# Patient Record
Sex: FEMALE | Race: WHITE | NOT HISPANIC OR LATINO | Employment: OTHER | URBAN - METROPOLITAN AREA
[De-identification: names, ages, dates, MRNs, and addresses within clinical notes are randomized per-mention and may not be internally consistent; named-entity substitution may affect disease eponyms.]

---

## 2017-01-25 ENCOUNTER — OFFICE VISIT (OUTPATIENT)
Dept: FAMILY MEDICINE | Facility: CLINIC | Age: 77
End: 2017-01-25
Payer: MEDICARE

## 2017-01-25 VITALS
HEART RATE: 58 BPM | WEIGHT: 178 LBS | SYSTOLIC BLOOD PRESSURE: 130 MMHG | DIASTOLIC BLOOD PRESSURE: 64 MMHG | BODY MASS INDEX: 26.98 KG/M2 | TEMPERATURE: 99 F | HEIGHT: 68 IN | OXYGEN SATURATION: 97 %

## 2017-01-25 DIAGNOSIS — E78.2 MIXED HYPERLIPIDEMIA: ICD-10-CM

## 2017-01-25 DIAGNOSIS — Z12.39 SCREENING FOR BREAST CANCER: ICD-10-CM

## 2017-01-25 DIAGNOSIS — N32.81 OAB (OVERACTIVE BLADDER): ICD-10-CM

## 2017-01-25 DIAGNOSIS — I10 ESSENTIAL HYPERTENSION: ICD-10-CM

## 2017-01-25 DIAGNOSIS — Z23 ENCOUNTER FOR IMMUNIZATION: ICD-10-CM

## 2017-01-25 DIAGNOSIS — Z00.00 ROUTINE MEDICAL EXAM: Primary | ICD-10-CM

## 2017-01-25 PROCEDURE — 99213 OFFICE O/P EST LOW 20 MIN: CPT | Mod: PBBFAC,PO

## 2017-01-25 PROCEDURE — 99999 PR PBB SHADOW E&M-EST. PATIENT-LVL III: CPT | Mod: PBBFAC,,,

## 2017-01-25 PROCEDURE — G0008 ADMIN INFLUENZA VIRUS VAC: HCPCS | Mod: PBBFAC,PO

## 2017-01-25 PROCEDURE — 90670 PCV13 VACCINE IM: CPT | Mod: PBBFAC,PO

## 2017-01-25 PROCEDURE — 99397 PER PM REEVAL EST PAT 65+ YR: CPT | Mod: S$PBB,,,

## 2017-01-25 RX ORDER — SOLIFENACIN SUCCINATE 10 MG/1
10 TABLET, FILM COATED ORAL DAILY
Qty: 90 TABLET | Refills: 1 | Status: SHIPPED | OUTPATIENT
Start: 2017-01-25 | End: 2017-05-15 | Stop reason: SDUPTHER

## 2017-01-25 NOTE — MR AVS SNAPSHOT
[facility]  [address]  [place] [address]  Phone: [phone]  Fax: [phone]                  Lola Lerner   2017 2:20 PM   Office Visit    Description:  Female : 1940   Provider:  Eliazar Salazar Jr., MD   Department:  [facility]           Reason for Visit     Annual Exam           Diagnoses this Visit        Comments    Routine medical exam    -  Primary     Essential hypertension         Mixed hyperlipidemia         OAB (overactive bladder)         Encounter for immunization         Screening for breast cancer                To Do List           Future Appointments        Provider Department Dept Phone    2017 9:00 AM [facility] CT1 LIMIT 400 [address] [facility] [phone]    2017 9:30 AM [facility] CT1 LIMIT 400 L[facility] [phone]    5/15/2017 10:00 AM Ryann Bell MD [facility] [phone]      Goals (5 Years of Data)     None      Follow-Up and Disposition     Return in about 1 year (around 2018).       These Medications        Disp Refills Start End    solifenacin (VESICARE) 10 MG tablet 90 tablet 1 2017     Take 1 tablet (10 mg total) by mouth once daily. - Oral    Pharmacy: [facility] - [address] #: [phone]         [facility]     [facility] On [facility] Nurse Care Line -  Assistance  Registered nurses in the [facility] provide clinical advisement, health education, appointment booking, and other advisory services.  Call for this free service at [phone].             Medications           Message regarding Medications     Verify the changes and/or additions to your medication regime listed below are the same as discussed with your clinician today.  If any of these changes or additions are incorrect, please notify your healthcare provider.             Verify that the below list of medications  "is an accurate representation of the medications you are currently taking.  If none reported, the list may be blank. If incorrect, please contact your healthcare provider. Carry this list with you in case of emergency.           Current Medications     aspirin (ECOTRIN) 81 MG EC tablet Take 81 mg by mouth every evening. 1 Tablet, Delayed Release (E.C.) Oral Every day    atenolol (TENORMIN) 25 MG tablet Take 25 mg by mouth every morning.  Tablet Oral     calcium carbonate-vitamin D3 600 mg(1,500mg) -100 unit Cap Take by mouth. 1 Capsule Oral Twice a day    docusate sodium (COLACE) 100 MG capsule Take 1 capsule (100 mg total) by mouth 2 (two) times daily.    losartan-hydrochlorothiazide 100-25 mg (HYZAAR) 100-25 mg per tablet Take 1 tablet by mouth every morning. 1 Tablet Oral Every day    multivitamin (ONE DAILY MULTIVITAMIN) per tablet Take 1 tablet by mouth every morning.     rosuvastatin (CRESTOR) 10 MG tablet Take 10 mg by mouth every evening. 1 Tablet Oral Every day    solifenacin (VESICARE) 10 MG tablet Take 1 tablet (10 mg total) by mouth once daily.           Clinical Reference Information           Vital Signs - Last Recorded  Most recent update: 1/25/2017  2:13 PM by Juarez Moon MA    BP Pulse Temp Ht Wt SpO2    130/64 (BP Location: Left arm, Patient Position: Sitting, BP Method: Manual) (!) 58 98.5 °F (36.9 °C) 5' 8" (1.727 m) 80.7 kg (178 lb 0.3 oz) 97%    BMI                27.07 kg/m2          Blood Pressure          Most Recent Value    BP  130/64      Allergies as of 1/25/2017     No Known Allergies      Immunizations Administered on Date of Encounter - 1/25/2017     Name Date Dose VIS Date Route    Influenza - High Dose  Incomplete 0.5 mL 8/7/2015 Intramuscular    Pneumococcal Conjugate - 13 Valent  Incomplete 0.5 mL 11/5/2015 Intramuscular      Orders Placed During Today's Visit      Normal Orders This Visit    Influenza - High Dose (65+) (PF) (IM)     Pneumococcal Conjugate Vaccine (13 " Linda) (IM)     Future Labs/Procedures Expected by Expires    Comprehensive metabolic panel  1/25/2017 1/25/2018    Lipid panel  1/25/2017 1/25/2018    Mammo Breast Specimen  1/25/2017 3/25/2018      [facility] Sign-Up     Activating your [facility] account is as easy as 1-2-3!     1) Visit [email or web address], select Sign Up Now, enter this activation code and your date of birth, then select Next.  [email or web address]  Expires: 3/11/2017  2:49 PM      2) Create a username and password to use when you visit [facility] in the future and select a security question in case you lose your password and select Next.    3) Enter your e-mail address and click Sign Up!    Additional Information  If you have questions, please e-mail [email or web address] or call [phone] to talk to our [facility] staff. Remember, [facility] is NOT to be used for urgent needs. For medical emergencies, dial 911.

## 2017-01-25 NOTE — PROGRESS NOTES
Chief Complaint   Patient presents with    Annual Exam       HPI  Lola Lerner is a 76 y.o. female who presents to the office for periodic evaluation/physical examination.  Since I had seen her most recently about 15 or 20 months ago, patient has had a right partial nephrectomy for renal cell carcinoma.  Margins are all clear, patient has recovered nicely.  She has hypertension, hyperlipidemia, urinary incontinence with overactive bladder, but in general is in very good health, with good and healthy lifestyle.  Pt is known to me.    HPI    PAST MEDICAL HISTORY:  Past Medical History   Diagnosis Date    Hyperlipidemia     Hypertension     Urge incontinence      sees Dr. Julien, urology    Vaginal delivery      x3       PAST SURGICAL HISTORY:  Past Surgical History   Procedure Laterality Date    Cholecystectomy      Breast biopsy       Benign    Partial nephrectomy         SOCIAL HISTORY:  Social History     Social History    Marital status:      Spouse name: N/A    Number of children: N/A    Years of education: N/A     Occupational History    Not on file.     Social History Main Topics    Smoking status: Never Smoker    Smokeless tobacco: Never Used    Alcohol use No    Drug use: No    Sexual activity: Not on file     Other Topics Concern    Not on file     Social History Narrative     x 50 yr.  .  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          FAMILY HISTORY:  Family History   Problem Relation Age of Onset    Heart disease Mother        ALLERGIES AND MEDICATIONS: updated and reviewed.  Review of patient's allergies indicates:  No Known Allergies  Current Outpatient Prescriptions   Medication Sig Dispense Refill    aspirin (ECOTRIN) 81 MG EC tablet Take 81 mg by mouth every evening. 1 Tablet, Delayed Release (E.C.) Oral Every day      atenolol (TENORMIN) 25 MG tablet Take 25 mg by mouth every morning.  Tablet Oral       calcium carbonate-vitamin D3 600 mg(1,500mg) -100 unit Cap Take  "by mouth. 1 Capsule Oral Twice a day      docusate sodium (COLACE) 100 MG capsule Take 1 capsule (100 mg total) by mouth 2 (two) times daily. 60 capsule 0    losartan-hydrochlorothiazide 100-25 mg (HYZAAR) 100-25 mg per tablet Take 1 tablet by mouth every morning. 1 Tablet Oral Every day      multivitamin (ONE DAILY MULTIVITAMIN) per tablet Take 1 tablet by mouth every morning.       rosuvastatin (CRESTOR) 10 MG tablet Take 10 mg by mouth every evening. 1 Tablet Oral Every day      solifenacin (VESICARE) 10 MG tablet Take 1 tablet (10 mg total) by mouth once daily. 90 tablet 1     No current facility-administered medications for this visit.        ROS  Review of Systems   Constitutional: Negative for activity change and unexpected weight change.   HENT: Negative.    Eyes: Negative.    Respiratory: Negative.    Cardiovascular: Negative.    Gastrointestinal:        No change of bowel habits.  No need for further colonoscopies.   Endocrine: Negative.    Genitourinary:        See history of present illness.   Musculoskeletal: Negative.    Neurological: Negative.    Psychiatric/Behavioral: Negative.        Physical Exam  Vitals:    01/25/17 1412   BP: 130/64   Pulse: (!) 58   Temp: 98.5 °F (36.9 °C)    Body mass index is 27.07 kg/(m^2).  Weight: 80.7 kg (178 lb 0.3 oz)   Height: 5' 8" (172.7 cm)     Physical Exam   Constitutional: She is oriented to person, place, and time. She appears well-developed and well-nourished. No distress.   HENT:   TMs are normal.  Oropharynx is clear.   Eyes: Conjunctivae are normal. Pupils are equal, round, and reactive to light. No scleral icterus.   Neck: Normal range of motion. Neck supple. No thyromegaly present.   Cardiovascular: Normal rate and regular rhythm.    No murmur heard.  Pulmonary/Chest: Effort normal and breath sounds normal.   Abdominal: Soft. Bowel sounds are normal. She exhibits no mass.   Musculoskeletal: Normal range of motion. She exhibits no edema. "   Neurological: She is alert and oriented to person, place, and time.   Skin: Skin is warm and dry.   Psychiatric: She has a normal mood and affect. Her behavior is normal.   Vitals reviewed.      Health Maintenance       Date Due Completion Date    TETANUS VACCINE 7/12/1959 ---    DEXA SCAN 6/15/1998 6/15/1995 (Done)    Override on 6/15/1995: Done    Pneumococcal (65+) (2 of 2 - PCV13) 6/8/2016 6/8/2015    Lipid Panel 4/2/2020 4/2/2015 (Done)    Override on 4/2/2015: Done    Colonoscopy 1/25/2027 1/25/2017 (Not Clinical)    Override on 1/25/2017: Not Clinically Appropriate    Override on 7/17/2005: Done          Assessment & Plan    1. Routine medical exam  Healthy examination.  Continue healthy lifestyle.    2. Essential hypertension  Continue present medical regimen.  - Comprehensive metabolic panel; Future    3. Mixed hyperlipidemia  Continue present medical regimen.  - Lipid panel; Future    4. OAB (overactive bladder)  Medication refilled for the patient, she can follow-up with Dr. Bell.  - solifenacin (VESICARE) 10 MG tablet; Take 1 tablet (10 mg total) by mouth once daily.  Dispense: 90 tablet; Refill: 1    5. Encounter for immunization  This catches the patient up for health maintenance.  - Influenza - High Dose (65+) (PF) (IM)  - Pneumococcal Conjugate Vaccine (13 Valent) (IM)    6. Screening for breast cancer  Routine examination requested.  - Mammo Breast Specimen; Future      Return in about 1 year (around 1/25/2018).

## 2017-01-26 ENCOUNTER — HOSPITAL ENCOUNTER (OUTPATIENT)
Dept: RADIOLOGY | Facility: HOSPITAL | Age: 77
Discharge: HOME | End: 2017-01-26
Payer: MEDICARE

## 2017-01-26 DIAGNOSIS — Z12.31 ENCOUNTER FOR SCREENING MAMMOGRAM FOR BREAST CANCER: ICD-10-CM

## 2017-01-26 DIAGNOSIS — Z12.39 SCREENING FOR BREAST CANCER: ICD-10-CM

## 2017-01-26 PROCEDURE — 77067 SCR MAMMO BI INCL CAD: CPT | Mod: TC

## 2017-01-26 PROCEDURE — 77063 BREAST TOMOSYNTHESIS BI: CPT | Mod: 26,,, | Performed by: RADIOLOGY

## 2017-01-26 PROCEDURE — 77067 SCR MAMMO BI INCL CAD: CPT | Mod: 26,,, | Performed by: RADIOLOGY

## 2017-05-08 ENCOUNTER — HOSPITAL ENCOUNTER (OUTPATIENT)
Dept: RADIOLOGY | Facility: HOSPITAL | Age: 77
Discharge: HOME | End: 2017-05-08
Attending: UROLOGY
Payer: MEDICARE

## 2017-05-08 DIAGNOSIS — C64.1 MALIGNANT NEOPLASM OF RIGHT KIDNEY: ICD-10-CM

## 2017-05-08 PROCEDURE — 74178 CT ABD&PLV WO CNTR FLWD CNTR: CPT | Mod: TC

## 2017-05-08 PROCEDURE — 71260 CT THORAX DX C+: CPT | Mod: 26,,, | Performed by: RADIOLOGY

## 2017-05-08 PROCEDURE — 25500020 PHARM REV CODE 255: Performed by: UROLOGY

## 2017-05-08 PROCEDURE — 71260 CT THORAX DX C+: CPT | Mod: TC

## 2017-05-08 PROCEDURE — 74178 CT ABD&PLV WO CNTR FLWD CNTR: CPT | Mod: 26,,, | Performed by: RADIOLOGY

## 2017-05-08 RX ADMIN — IOHEXOL 15 ML: 300 INJECTION, SOLUTION INTRAVENOUS at 08:05

## 2017-05-08 RX ADMIN — IOHEXOL 75 ML: 350 INJECTION, SOLUTION INTRAVENOUS at 08:05

## 2017-05-15 ENCOUNTER — OFFICE VISIT (OUTPATIENT)
Dept: UROLOGY | Facility: CLINIC | Age: 77
End: 2017-05-15
Payer: MEDICARE

## 2017-05-15 VITALS
RESPIRATION RATE: 14 BRPM | WEIGHT: 178 LBS | HEART RATE: 72 BPM | BODY MASS INDEX: 26.98 KG/M2 | HEIGHT: 68 IN | DIASTOLIC BLOOD PRESSURE: 76 MMHG | SYSTOLIC BLOOD PRESSURE: 138 MMHG

## 2017-05-15 DIAGNOSIS — N32.81 OAB (OVERACTIVE BLADDER): ICD-10-CM

## 2017-05-15 DIAGNOSIS — N39.0 RECURRENT UTI: ICD-10-CM

## 2017-05-15 DIAGNOSIS — C64.1 MALIGNANT NEOPLASM OF RIGHT KIDNEY: Primary | ICD-10-CM

## 2017-05-15 LAB
BILIRUB SERPL-MCNC: NORMAL MG/DL
BLOOD URINE, POC: NORMAL
COLOR, POC UA: NORMAL
GLUCOSE UR QL STRIP: NORMAL
KETONES UR QL STRIP: NORMAL
LEUKOCYTE ESTERASE URINE, POC: NORMAL
NITRITE, POC UA: NORMAL
PH, POC UA: 6
PROTEIN, POC: NORMAL
SPECIFIC GRAVITY, POC UA: 1005
UROBILINOGEN, POC UA: NORMAL

## 2017-05-15 PROCEDURE — 99214 OFFICE O/P EST MOD 30 MIN: CPT | Mod: S$PBB,,, | Performed by: UROLOGY

## 2017-05-15 PROCEDURE — 99999 PR PBB SHADOW E&M-EST. PATIENT-LVL III: CPT | Mod: PBBFAC,,, | Performed by: UROLOGY

## 2017-05-15 PROCEDURE — 81002 URINALYSIS NONAUTO W/O SCOPE: CPT | Mod: PBBFAC | Performed by: UROLOGY

## 2017-05-15 PROCEDURE — 99213 OFFICE O/P EST LOW 20 MIN: CPT | Mod: PBBFAC | Performed by: UROLOGY

## 2017-05-15 RX ORDER — SOLIFENACIN SUCCINATE 10 MG/1
10 TABLET, FILM COATED ORAL DAILY
Qty: 90 TABLET | Refills: 3 | Status: SHIPPED | OUTPATIENT
Start: 2017-05-15 | End: 2018-10-01 | Stop reason: SDUPTHER

## 2017-05-15 NOTE — PROGRESS NOTES
Subjective:       Lola Lerner is a 76 y.o. female who is an established patient who was self-referred for evaluation of OAB/UTIs and RCC.      Recurrent UTIs  She reports recurrent UTIs. No recent UCx in Epic to review. She reports this happens about 3 times a year. She has classic UTI symptoms - dysuria, frequency. Denies fevers. Denies constipation. No h/o stones.      She also has issues with OAB. Reports urgency and frequency. Nocturia x 2. UUI common usually due to waiting to void. Denies SAMSON. She is on Vesicare 10mg now for about 3 years. This medication helps a lot.     She was treated for UTI after last visit in 4/16 (>100k E coli).    RCC  VICTORINA was done prior to last visit for UTI workup and noted a 3.5cm solid R renal mass. PVR 66cc. No family history of  malignancy. No gross hematuria.      CT scan shows an enhancing 4.6cm R UP renal mass. CXR showed abnormal area in R lung. CT chest showed likely inflammatory scarring, doubt metastasis.    She is now s/p R open partial nephrectomy on 8/17/16. She did well after surgery.    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   CT c/a/p 5/17 - post-op changes R kidney, RUL lung area resolved, now with 2mm nodule in RML lung. Rec CT chest follow up.        The following portions of the patient's history were reviewed and updated as appropriate: allergies, current medications, past family history, past medical history, past social history, past surgical history and problem list.    Review of Systems  Constitutional: no fever or chills  ENT: no nasal congestion or sore throat  Respiratory: no cough or shortness of breath  Cardiovascular: no chest pain or palpitations  Gastrointestinal: no nausea or vomiting, tolerating diet  Genitourinary: as per HPI  Hematologic/Lymphatic: no easy bruising or lymphadenopathy  Musculoskeletal: no  "arthralgias or myalgias  Skin: no rashes or lesions  Neurological: no seizures or tremors  Behavioral/Psych: no auditory or visual hallucinations       Objective:    Vitals:   /76  Pulse 72  Resp 14  Ht 5' 8" (1.727 m)  Wt 80.7 kg (178 lb)  BMI 27.06 kg/m2    Physical Exam   General: well developed, well nourished in no acute distress  Head: normocephalic, atraumatic  Neck: supple, trachea midline, no obvious enlargement of thyroid  HEENT: EOMI, mucus membranes moist, sclera anicteric, no hearing impairment  Lungs: symmetric expansion, non-labored breathing  Cardiovascular: regular rate and rhythm, normal pulses  Abdomen: soft, non tender, non distended, no palpable masses, no hepatosplenomegaly, no hernias, no CVA tenderness  Musculoskeletal: no peripheral edema, normal ROM in bilateral upper and lower extremities  Lymphatics: no cervical or inguinal lymphadenopathy  Skin: no rashes or lesions  Neuro: alert and oriented x 3, no gross deficits  Psych: normal judgment and insight, normal mood/affect and non-anxious  Genitourinary:   patient declined exam    Inc R flank - well healed      Lab Review   Urine analysis today in clinic shows - negative    Lab Results   Component Value Date    WBC 8.50 08/20/2016    HGB 13.1 08/20/2016    HCT 39.5 08/20/2016    MCV 92 08/20/2016     08/20/2016     Lab Results   Component Value Date    CREATININE 0.9 05/08/2017    BUN 14 01/26/2017         Imaging  Images and reports were personally reviewed by me and discussed with patient  VICTORINA reviewed  CT reviewed       Assessment/Plan:      1. Recurrent UTI    - VICTORINA with PVR - R renal mass, mildly elevated PVR 66cc   - Recommended Estrace, she declined this.    - Improving UUI will also likely help improve UTIs     2. OAB (overactive bladder)    - Continue Vesicare, doing well with this.   - Offered Myrbetriq in addition to Vesicare, she declined.     3. Malignant neoplasm of R kidney   - 3.5cm solid renal mass noted " on VICTORINA   - CT confirms 4.8cm enhacing RUP mass    - s/p open R partial nephrectomy on 8/17/16   - Pathology: ccRCC, 4.2cm, pT1b, FG 2, neg margins   - CT c/a/p 11/16 - stable 1.6cm area in RUL lung (inflammatory), likely post-op changes in R kidney.    - CT c/a/p 5/17 - R kidney stable, new 2mm nodule in RML lung.         Follow up in 6 months with CT scan

## 2017-05-15 NOTE — MR AVS SNAPSHOT
[facility]  [address] [facility] [address]  [place] [address]  Phone: [phone]                  Lola Lerner   5/15/2017 10:00 AM   Office Visit    Description:  Female : 1940   Provider:  Ryann Bell MD   Department:  [facility]           Reason for Visit     Follow-up           Diagnoses this Visit        Comments    Malignant neoplasm of right kidney    -  Primary     Recurrent UTI         OAB (overactive bladder)                To Do List           Goals (5 Years of Data)     None      Follow-Up and Disposition     Return in about 6 months (around 11/15/2017) for CT scan follow up.      [facility]     [facility] Nurse Care Line -  Assistance  Unless otherwise directed by your provider, please contact [facility], our nurse care line that is available for  assistance.     Registered nurses in the [facility] provide: appointment scheduling, clinical advisement, health education, and other advisory services.  Call: [phone] (toll free)               Medications           Message regarding Medications     Verify the changes and/or additions to your medication regime listed below are the same as discussed with your clinician today.  If any of these changes or additions are incorrect, please notify your healthcare provider.        STOP taking these medications     docusate sodium (COLACE) 100 MG capsule Take 1 capsule (100 mg total) by mouth 2 (two) times daily.           Verify that the below list of medications is an accurate representation of the medications you are currently taking.  If none reported, the list may be blank. If incorrect, please contact your healthcare provider. Carry this list with you in case of emergency.           Current Medications     aspirin (ECOTRIN) 81 MG EC tablet Take 81 mg by mouth every evening. 1 Tablet, Delayed Release (E.C.) Oral Every day    atenolol (TENORMIN) 25 MG tablet Take 25 mg by  "mouth every morning.  Tablet Oral     calcium carbonate-vitamin D3 600 mg(1,500mg) -100 unit Cap Take by mouth. 1 Capsule Oral Twice a day    losartan-hydrochlorothiazide 100-25 mg (HYZAAR) 100-25 mg per tablet Take 1 tablet by mouth every morning. 1 Tablet Oral Every day    multivitamin (ONE DAILY MULTIVITAMIN) per tablet Take 1 tablet by mouth every morning.     rosuvastatin (CRESTOR) 10 MG tablet Take 10 mg by mouth every evening. 1 Tablet Oral Every day    solifenacin (VESICARE) 10 MG tablet Take 1 tablet (10 mg total) by mouth once daily.           Clinical Reference Information           Your Vitals Were     BP Pulse Resp Height Weight BMI    138/76 72 14 5' 8" (1.727 m) 80.7 kg (178 lb) 27.06 kg/m2      Blood Pressure          Most Recent Value    BP  138/76      Allergies as of 5/15/2017     No Known Allergies      Immunizations Administered on Date of Encounter - 5/15/2017     None      Orders Placed During Today's Visit      Normal Orders This Visit    POCT URINE DIPSTICK WITHOUT MICROSCOPE     Future Labs/Procedures Expected by Expires    Creatinine, serum  11/15/2017 7/14/2018    CT Abdomen Pelvis W Wo Contrast  11/15/2017 5/15/2018    CT Chest With Contrast  11/15/2017 5/15/2018      [facility] Sign-Up     Activating your [facility] account is as easy as 1-2-3!     1) Visit [email or web address], select Sign Up Now, enter this activation code and your date of birth, then select Next.  [email or web address]  Expires: 6/29/2017 10:02 AM      2) Create a username and password to use when you visit [facility] in the future and select a security question in case you lose your password and select Next.    3) Enter your e-mail address and click Sign Up!    Additional Information  If you have questions, please e-mail [email or web address] or call [phone] to talk to our [facility] staff. Remember, [facility] is NOT to be used for urgent needs. For medical emergencies, dial 911.         Language Assistance Services     " ATTENTION: Language assistance services are available, free of charge. Please call [phone].      ATENCIÓN: Si habla quiqueañol, tiene a bella disposición servicios gratuitos de asistencia lingüística. Llame al [phone].     CHÚ Ý: N?u b?n nói Ti?ng Vi?t, có các d?ch v? h? tr? ngôn ng? mi?n phí dành cho b?n. G?i s? [phone].         [facility] complies with applicable Federal civil rights laws and does not discriminate on the basis of race, color, national origin, age, disability, or sex.

## 2017-09-13 PROCEDURE — 99283 EMERGENCY DEPT VISIT LOW MDM: CPT

## 2017-09-14 ENCOUNTER — HOSPITAL ENCOUNTER (EMERGENCY)
Facility: HOSPITAL | Age: 77
Discharge: HOME | End: 2017-09-14
Attending: EMERGENCY MEDICINE
Payer: MEDICARE

## 2017-09-14 VITALS
SYSTOLIC BLOOD PRESSURE: 201 MMHG | HEIGHT: 68 IN | RESPIRATION RATE: 16 BRPM | WEIGHT: 174 LBS | TEMPERATURE: 99 F | DIASTOLIC BLOOD PRESSURE: 91 MMHG | HEART RATE: 75 BPM | BODY MASS INDEX: 26.37 KG/M2

## 2017-09-14 DIAGNOSIS — S90.512A: Primary | ICD-10-CM

## 2017-09-14 NOTE — ED TRIAGE NOTES
Pt c/o cutting self while shaving the Lt leg.  Bleeding controlled.  Pt denies any lightheadedness or dizziness.

## 2017-09-14 NOTE — ED PROVIDER NOTES
"Encounter Date: 9/13/2017    SCRIBE #1 NOTE: I, Jamal Hartman, am scribing for, and in the presence of,  Frank Rojas PA-C. I have scribed the following portions of the note - Other sections scribed: HPI, ROS.       History     Chief Complaint   Patient presents with    Laceration     Pt reports blood skirting out from LLE onset 1hr ago after shaving, had a tourniquit place. Pt denies taking blood thinners. Pt states " she didn't take her BP meds today, becasues she is scheduled for a stress test tomorrow.      CC: Laceration    77 year old female  has a past medical history of Hyperlipidemia; Hypertension; Urge incontinence; and Vaginal delivery presents to the ED for evaluation of an abrasion to her left lower limb after she shaved her legs.  placed tourniquet above her ankle. She took a 325 mg aspirin this morning. She denies chest pain, light headedness, and dizziness. No other symptoms reported.       The history is provided by the patient. No  was used.     Review of patient's allergies indicates:  No Known Allergies  Past Medical History:   Diagnosis Date    Hyperlipidemia     Hypertension     Urge incontinence     sees Dr. Julien, urology    Vaginal delivery     x3     Past Surgical History:   Procedure Laterality Date    BREAST BIOPSY      Benign    CHOLECYSTECTOMY      PARTIAL NEPHRECTOMY       Family History   Problem Relation Age of Onset    Heart disease Mother      Social History   Substance Use Topics    Smoking status: Never Smoker    Smokeless tobacco: Never Used    Alcohol use No     Review of Systems   Constitutional: Negative for fever.   HENT: Negative for sore throat.    Respiratory: Negative for shortness of breath.    Cardiovascular: Negative for chest pain.   Gastrointestinal: Negative for nausea.   Genitourinary: Negative for dysuria.   Musculoskeletal: Negative for back pain.   Skin: Negative for rash.        (+) abrasion to lower left limb "        Neurological: Negative for dizziness, weakness and light-headedness.   Hematological: Does not bruise/bleed easily.       Physical Exam     Initial Vitals [09/13/17 2320]   BP Pulse Resp Temp SpO2   (!) 201/91 75 16 98.8 °F (37.1 °C) --      MAP       127.67         Physical Exam    Nursing note and vitals reviewed.  Constitutional: She appears well-developed and well-nourished. She is not diaphoretic. No distress.   HENT:   Head: Normocephalic and atraumatic.   Eyes: Conjunctivae and EOM are normal. Pupils are equal, round, and reactive to light.   Neck: Normal range of motion. Neck supple. No tracheal deviation present.   Cardiovascular: Normal heart sounds.   Pulmonary/Chest: Breath sounds normal. No stridor. No respiratory distress. She has no wheezes. She has no rhonchi. She has no rales. She exhibits no tenderness.   Abdominal: Soft. Bowel sounds are normal. She exhibits no distension and no mass. There is no tenderness. There is no rebound and no guarding.   Musculoskeletal: Normal range of motion. She exhibits no tenderness.        Legs:  Lymphadenopathy:     She has no cervical adenopathy.   Neurological: She is alert and oriented to person, place, and time.   Skin: Skin is warm and dry. Capillary refill takes less than 2 seconds.   Psychiatric: She has a normal mood and affect. Her behavior is normal. Judgment and thought content normal.         ED Course   Procedures  Labs Reviewed - No data to display          Medical Decision Making:   Initial Assessment:   77-year-old female chief complaint laceration to left ankle after shaving legs earlier this evening.  Differential Diagnosis:   Laceration, infected wound, abscess  ED Management:  Patient overall well-appearing, in no acute distress, afebrile, she is hypertensive however has not taken her normal be few medicines secondary to stress test tomorrow.    Physical exam, there is a small abrasion noted to left lateral lower extremity.  No active  bleeding.  No surrounding erythema or warmth.  No purulence or wound drainage.  I do not suspect infection at this time.  Patient has significant lower extremity varicosities, therefore suspects she may have clipped one of these varicosities while she was shaving.  Her  did apply homing tourniquet in addition to gentle compression.  Bleeding has stopped.  Patient has no difficulty with ambulation.  I see no signs of infection.  I've asked her to continue with her normally scheduled meds, present to her cardiac stress test, and to return to this ED if any other problems occur.  Also asked her to schedule appointment with primary care physician in 1-2 days for reevaluation of wound.  She does understand and agree.    Other:   I have discussed this case with another health care provider.       <> Summary of the Discussion: I have discussed this case with Dr. Smith.            Scribe Attestation:   Scribe #1: I performed the above scribed service and the documentation accurately describes the services I performed. I attest to the accuracy of the note.    Attending Attestation:           Physician Attestation for Scribe:  Physician Attestation Statement for Scribe #1: I, Frank Rojas PA-C, reviewed documentation, as scribed by Jamal Hartman in my presence, and it is both accurate and complete.                 ED Course      Clinical Impression:   The encounter diagnosis was Abrasion, ankle without infection, left, initial encounter.    Disposition:   Disposition: Discharged  Condition: Stable                        Frank Rojas PA-C  09/14/17 0312

## 2017-09-14 NOTE — DISCHARGE INSTRUCTIONS
Continue with your normally scheduled medications. Continue with scheduled appt with primary care physician. Continue with gentle compression if bleeding reoccurs.  Return to this ED if wound becomes warm, swollen, begins to drain, if you become febrile, or if any other signs of infection.

## 2017-11-15 ENCOUNTER — HOSPITAL ENCOUNTER (OUTPATIENT)
Dept: RADIOLOGY | Facility: HOSPITAL | Age: 77
Discharge: HOME | End: 2017-11-15
Attending: UROLOGY
Payer: MEDICARE

## 2017-11-15 DIAGNOSIS — C64.1 MALIGNANT NEOPLASM OF RIGHT KIDNEY: ICD-10-CM

## 2017-11-15 PROCEDURE — 71260 CT THORAX DX C+: CPT | Mod: TC

## 2017-11-15 PROCEDURE — 71260 CT THORAX DX C+: CPT | Mod: 26,,, | Performed by: RADIOLOGY

## 2017-11-15 PROCEDURE — 74178 CT ABD&PLV WO CNTR FLWD CNTR: CPT | Mod: 26,,, | Performed by: RADIOLOGY

## 2017-11-15 PROCEDURE — 25500020 PHARM REV CODE 255: Performed by: UROLOGY

## 2017-11-15 PROCEDURE — 74178 CT ABD&PLV WO CNTR FLWD CNTR: CPT | Mod: TC

## 2017-11-15 RX ADMIN — IOHEXOL 100 ML: 350 INJECTION, SOLUTION INTRAVENOUS at 09:11

## 2017-11-15 RX ADMIN — IOHEXOL 15 ML: 300 INJECTION, SOLUTION INTRAVENOUS at 09:11

## 2017-11-27 ENCOUNTER — OFFICE VISIT (OUTPATIENT)
Dept: UROLOGY | Facility: CLINIC | Age: 77
End: 2017-11-27
Payer: MEDICARE

## 2017-11-27 VITALS
SYSTOLIC BLOOD PRESSURE: 118 MMHG | DIASTOLIC BLOOD PRESSURE: 78 MMHG | HEART RATE: 68 BPM | HEIGHT: 67 IN | BODY MASS INDEX: 27.41 KG/M2 | WEIGHT: 174.63 LBS

## 2017-11-27 DIAGNOSIS — N39.0 RECURRENT UTI: ICD-10-CM

## 2017-11-27 DIAGNOSIS — C64.1 MALIGNANT NEOPLASM OF RIGHT KIDNEY: Primary | ICD-10-CM

## 2017-11-27 DIAGNOSIS — N32.81 OAB (OVERACTIVE BLADDER): ICD-10-CM

## 2017-11-27 PROCEDURE — 99214 OFFICE O/P EST MOD 30 MIN: CPT | Mod: S$PBB,,, | Performed by: UROLOGY

## 2017-11-27 PROCEDURE — 99213 OFFICE O/P EST LOW 20 MIN: CPT | Mod: PBBFAC | Performed by: UROLOGY

## 2017-11-27 PROCEDURE — 99999 PR PBB SHADOW E&M-EST. PATIENT-LVL III: CPT | Mod: PBBFAC,,, | Performed by: UROLOGY

## 2018-03-21 DIAGNOSIS — Z12.39 SCREENING BREAST EXAMINATION: Primary | ICD-10-CM

## 2018-04-03 ENCOUNTER — HOSPITAL ENCOUNTER (OUTPATIENT)
Dept: RADIOLOGY | Facility: HOSPITAL | Age: 78
Discharge: HOME | End: 2018-04-03
Attending: FAMILY MEDICINE
Payer: MEDICARE

## 2018-04-03 DIAGNOSIS — Z12.39 SCREENING BREAST EXAMINATION: ICD-10-CM

## 2018-04-03 PROCEDURE — 77067 SCR MAMMO BI INCL CAD: CPT | Mod: TC

## 2018-04-03 PROCEDURE — 77067 SCR MAMMO BI INCL CAD: CPT | Mod: 26,,, | Performed by: RADIOLOGY

## 2018-08-29 DIAGNOSIS — I87.2 VENOUS INSUFFICIENCY: Primary | ICD-10-CM

## 2018-09-19 ENCOUNTER — HOSPITAL ENCOUNTER (OUTPATIENT)
Dept: CARDIOLOGY | Facility: HOSPITAL | Age: 78
Discharge: HOME | End: 2018-09-19
Attending: SURGERY
Payer: MEDICARE

## 2018-09-19 DIAGNOSIS — I87.2 VENOUS INSUFFICIENCY: ICD-10-CM

## 2018-09-19 PROCEDURE — 93970 EXTREMITY STUDY: CPT

## 2018-09-19 PROCEDURE — 93970 EXTREMITY STUDY: CPT | Mod: 26,,, | Performed by: SURGERY

## 2018-09-20 ENCOUNTER — OFFICE VISIT (OUTPATIENT)
Dept: VASCULAR SURGERY | Facility: CLINIC | Age: 78
End: 2018-09-20
Payer: MEDICARE

## 2018-09-20 VITALS
DIASTOLIC BLOOD PRESSURE: 78 MMHG | HEIGHT: 67 IN | SYSTOLIC BLOOD PRESSURE: 132 MMHG | BODY MASS INDEX: 27.66 KG/M2 | HEART RATE: 61 BPM | WEIGHT: 176.25 LBS

## 2018-09-20 DIAGNOSIS — I87.2 VENOUS INSUFFICIENCY OF BOTH LOWER EXTREMITIES: Primary | ICD-10-CM

## 2018-09-20 DIAGNOSIS — I83.90 VARICOSE VEIN OF LEG: ICD-10-CM

## 2018-09-20 DIAGNOSIS — I83.899 BLEEDING FROM VARICOSE VEIN: ICD-10-CM

## 2018-09-20 PROCEDURE — 99999 PR PBB SHADOW E&M-EST. PATIENT-LVL III: CPT | Mod: PBBFAC,,, | Performed by: SURGERY

## 2018-09-20 PROCEDURE — 99203 OFFICE O/P NEW LOW 30 MIN: CPT | Mod: S$PBB,,, | Performed by: SURGERY

## 2018-09-20 PROCEDURE — 99213 OFFICE O/P EST LOW 20 MIN: CPT | Mod: PBBFAC | Performed by: SURGERY

## 2018-09-20 RX ORDER — LEVOTHYROXINE SODIUM 50 UG/1
50 TABLET ORAL DAILY
COMMUNITY
End: 2018-12-06 | Stop reason: CLARIF

## 2018-09-20 NOTE — PROGRESS NOTES
Jaden Banegas MD [facility]                         09/20/2018    HPI:  Lola Lerner is a 78 y.o. female with   Patient Active Problem List   Diagnosis    Hypertension    Hyperlipidemia    Recurrent UTI    OAB (overactive bladder)    Malignant neoplasm of right kidney    being managed by PCP and specialists who is here today for evaluation of LLE bleeding varicose vein.  Patient states location is LLE occurring for many years since pregnancies.  Associated signs and symptoms include edema, discoloration.  Quality is aching and severity is 3/10.  Symptoms began several years ago although recently she had a bleeding event from her LLE varicose vein after shaving legs.  Alleviating factors include sleeping supine.  Worsening factors include dependency.  She does not elevate BLE, eat a low Na diet or wear compression.    no MI  no Stroke  Tobacco use: no    Past Medical History:   Diagnosis Date    Hyperlipidemia     Hypertension     Urge incontinence     sees Dr. Julien, urology    Vaginal delivery     x3     Past Surgical History:   Procedure Laterality Date    BREAST BIOPSY Right     Core bx,. benign    CHOLECYSTECTOMY      NEPHRECTOMY-PARTIAL - open Right 8/17/2016    [facility]    PARTIAL NEPHRECTOMY       Family History   Problem Relation Age of Onset    Heart disease Mother      Social History     Socioeconomic History    Marital status:      Spouse name: Not on file    Number of children: Not on file    Years of education: Not on file    Highest education level: Not on file   Social Needs    Financial resource strain: Not on file    Food insecurity - worry: Not on file    Food insecurity - inability: Not on file    Transportation needs - medical: Not on file    Transportation needs - non-medical: Not on file   Occupational History    Not on file   Tobacco Use    Smoking status:  Never Smoker    Smokeless tobacco: Never Used   Substance and Sexual Activity    Alcohol use: No    Drug use: No    Sexual activity: Not on file   Other Topics Concern    Not on file   Social History Narrative     x 50 yr.  .  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          Current Outpatient Medications:     aspirin (ECOTRIN) 81 MG EC tablet, Take 81 mg by mouth every evening. 1 Tablet, Delayed Release (E.C.) Oral Every day, Disp: , Rfl:     atenolol (TENORMIN) 25 MG tablet, Take 25 mg by mouth every morning.  Tablet Oral , Disp: , Rfl:     calcium carbonate-vitamin D3 600 mg(1,500mg) -100 unit Cap, Take by mouth. 1 Capsule Oral Twice a day, Disp: , Rfl:     levothyroxine (SYNTHROID) 50 MCG tablet, Take 50 mcg by mouth once daily., Disp: , Rfl:     losartan-hydrochlorothiazide 100-25 mg (HYZAAR) 100-25 mg per tablet, Take 1 tablet by mouth every morning. 1 Tablet Oral Every day, Disp: , Rfl:     multivitamin (ONE DAILY MULTIVITAMIN) per tablet, Take 1 tablet by mouth every morning. , Disp: , Rfl:     rosuvastatin (CRESTOR) 10 MG tablet, Take 10 mg by mouth every evening. 1 Tablet Oral Every day, Disp: , Rfl:     solifenacin (VESICARE) 10 MG tablet, Take 1 tablet (10 mg total) by mouth once daily., Disp: 90 tablet, Rfl: 3    REVIEW OF SYSTEMS:  General: No fevers or chills; ENT: No sore throat; Allergy and Immunology: no persistent infections; Hematological and Lymphatic: No history of bleeding or easy bruising; Endocrine: negative; Respiratory: no cough, shortness of breath, or wheezing; Cardiovascular: no chest pain or dyspnea on exertion; Gastrointestinal: no abdominal pain/back, change in bowel habits, or bloody stools; Genito-Urinary: no dysuria, trouble voiding, or hematuria; Musculoskeletal: negative; Neurological: no TIA or stroke symptoms; Psychiatric: no nervousness, anxiety or depression.    PHYSICAL EXAM:      Pulse: 61         General appearance:  Alert, well-appearing, and in no distress.   Oriented to person, place, and time                    Neurological: Normal speech, no focal findings noted; CN II - XII grossly intact. RLE with sensation to light touch, LLE with sensation to light touch.            Musculoskeletal: Digits/nail without cyanosis/clubbing.  Strength 5/5 BLE.                    Neck: Supple, no significant adenopathy, no carotid bruit can be auscultated                  Chest:  Clear to auscultation, no wheezes, rales or rhonchi, symmetric air entry. No use of accessory muscles               Cardiac: Normal rate and regular rhythm, S1 and S2 normal            Abdomen: Soft, nontender, nondistended, no masses or organomegaly, no hernia     No rebound tenderness noted; bowel sounds normal     No groin adenopathy      Extremities:       2+ R DP pulse, 2+ L DP pulse     1+ RLE edema, 2+ LLE edema    Skin: RLE without wound; LLE without wound    CEAP 3/3    LAB RESULTS:  No results found for: CBC  Lab Results   Component Value Date    LABPROT 10.4 08/11/2016    INR 1.0 08/11/2016     Lab Results   Component Value Date     01/26/2017    K 4.2 01/26/2017     01/26/2017    CO2 27 01/26/2017     01/26/2017    BUN 14 01/26/2017    CREATININE 0.8 11/15/2017    CALCIUM 9.6 01/26/2017    ANIONGAP 9 01/26/2017    EGFRNONAA >60 11/15/2017     Lab Results   Component Value Date    WBC 8.50 08/20/2016    RBC 4.28 08/20/2016    HGB 13.1 08/20/2016    HCT 39.5 08/20/2016    MCV 92 08/20/2016    MCH 30.6 08/20/2016    MCHC 33.2 08/20/2016    RDW 13.3 08/20/2016     08/20/2016    MPV 10.9 08/20/2016    GRAN 5.7 08/20/2016    GRAN 67.5 08/20/2016    LYMPH 1.6 08/20/2016    LYMPH 19.3 08/20/2016    MONO 0.9 08/20/2016    MONO 10.5 08/20/2016    EOS 0.2 08/20/2016    BASO 0.01 08/20/2016    EOSINOPHIL 2.6 08/20/2016    BASOPHIL 0.1 08/20/2016    DIFFMETHOD Automated 08/20/2016     .No results found for: HGBA1C    IMAGING:  All pertinent imaging has been reviewed and interpreted  independently.    R GSV reflux at calf  L GSV reflux throughout  No SSV reflux BLE    IMP/PLAN:  78 y.o. female with   Patient Active Problem List   Diagnosis    Hypertension    Hyperlipidemia    Recurrent UTI    OAB (overactive bladder)    Malignant neoplasm of right kidney    being managed by PCP and specialists who is here today for evaluation of LLE bleeding varicose vein.    -recommend compression with Rx stockings, elevation, dietary changes associated with water and sodium intake discussed at length with patient  -Rec exercise  -RTC 3 mo for further eval of symptoms    I spent 30 minutes evaluating this patient and greater than 50% of the time was spent counseling, coordinator care and discussing the plan of care.  All questions were answered and patient stated understanding with agreement with the above treatment plan.    Jaden Banegas MD [facility]  Vascular and Endovascular Surgery

## 2018-09-20 NOTE — PATIENT INSTRUCTIONS
Low-Salt Diet  This diet removes foods that are high in salt. It also limits the amount of salt you use when cooking. It is most often used for people with high blood pressure, edema (fluid retention), and kidney, liver, or heart disease.  Table salt contains the mineral sodium. Your body needs sodium to work normally. But too much sodium can make your health problems worse. Your healthcare provider is recommending a low-salt (also called low-sodium) diet for you. Your total daily allowance of salt is 1,500 to 2,300 milligrams (mg). It is less than 1 teaspoon of table salt. This means you can have only about 500 to 700 mg of sodium at each meal. People with certain health problems should limit salt intake to the lower end of the recommended range.    When you cook, dont add much salt. If you can cook without using salt, even better. Dont add salt to your food at the table.  When shopping, read food labels. Salt is often called sodium on the label. Choose foods that are salt-free, low salt, or very low salt. Note that foods with reduced salt may not lower your salt intake enough.    Beans, potatoes, and pasta  Ok: Dry beans, split peas, lentils, potatoes, rice, macaroni, pasta, spaghetti without added salt  Avoid: Potato chips, tortilla chips, and similar products  Breads and cereals  Ok: Low-sodium breads, rolls, cereals, and cakes; low-salt crackers, matzo crackers  Avoid: Salted crackers, pretzels, popcorn, Luxembourgish toast, pancakes, muffins  Dairy  Ok: Milk, chocolate milk, hot chocolate mix, low-salt cheeses, and yogurt  Avoid: Processed cheese and cheese spreads; Roquefort, Camembert, and cottage cheese; buttermilk, instant breakfast drink  Desserts  Ok: Ice cream, frozen yogurt, juice bars, gelatin, cookies and pies, sugar, honey, jelly, hard candy  Avoid: Most pies, cakes and cookies prepared or processed with salt; instant pudding  Drinks  Ok: Tea, coffee, fizzy (carbonated) drinks, juices  Avoid: Flavored  coffees, electrolyte replacement drinks, sports drinks  Meats  Ok: All fresh meat, fish, poultry, low-salt tuna, eggs, egg substitute  Avoid: Smoked, pickled, brine-cured, or salted meats and fish. This includes burgess, chipped beef, corned beef, hot dogs, deli meats, ham, kosher meats, salt pork, sausage, canned tuna, salted codfish, smoked salmon, herring, sardines, or anchovies.  Seasonings and spices  Ok: Most seasonings are okay. Good substitutes for salt include: fresh herb blends, hot sauce, lemon, garlic, bryant, vinegar, dry mustard, parsley, cilantro, horseradish, tomato paste, regular margarine, mayonnaise, unsalted butter, cream cheese, vegetable oil, cream, low-salt salad dressing and gravy.  Avoid: Regular ketchup, relishes, pickles, soy sauce, teriyaki sauce, Worcestershire sauce, BBQ sauce, tartar sauce, meat tenderizer, chili sauce, regular gravy, regular salad dressing, salted butter  Soups  Ok: Low-salt soups and broths made with allowed foods  Avoid: Bouillon cubes, soups with smoked or salted meats, regular soup and broth  Vegetables  Ok: Most vegetables are okay; also low-salt tomato and vegetable juices  Avoid: Sauerkraut and other brine-soaked vegetables; pickles and other pickled vegetables; tomato juice, olives  Date Last Reviewed: 8/1/2016 © 2000-2017 [employer]. [address] [place] [id number]. All rights reserved. This information is not intended as a substitute for professional medical care. Always follow your healthcare professional's instructions.        Tips for Using Less Salt    Most people with heart problems need to eat less salt (sodium). Reducing the amount of salt you eat may help control your blood pressure. The higher your blood pressure, the greater your risk for heart disease, stroke, blindness, and kidney problems.  At the store  · Make low-salt choices by reading labels carefully. Look for the total amount of sodium per serving.  · Use more fresh  food. Buy more fruits and vegetables. Select lean meats, fish, and poultry.  · Use fewer frozen, canned, and packaged foods which often contain a lot of sodium.  · Use plain frozen vegetables without sauces or toppings. These products are often low- or no-sodium.  · Opt for reduced-sodium or no-salt-added versions of canned vegetables and soups.  In the kitchen  · Don't add salt to food when you're cooking. Season with flavorings such as onion, garlic, pepper, salt-free herbal blends, and lemon or lime juice.  · Use a cookbook containing low-salt recipes. It can give you ideas for tasty meals that are healthy for your heart.  · Sprinkle salt-free herbal blends on vegetables and meat.  · Drain and rinse canned foods, such as canned beans and vegetables, before cooking or eating.  Eating out  · Tell the  you're on a low-salt diet. Ask questions about the menu.  · Order fish, chicken, and meat broiled, baked, poached, or grilled without salt, butter, or breading.  · Use lemon, pepper, and salt-free herb mixes to add flavor.  · Choose plain steamed rice, boiled noodles, and baked or boiled potatoes. Top potatoes with chives and a little sour cream.     Beware! Salt goes by many other names. Limit foods with these words listed as ingredients: salt, sodium, soy sauce, baking soda, baking powder, MSG, monosodium, Na (the chemical symbol for sodium). Some antacids are also high in salt.   Date Last Reviewed: 6/19/2015  © 3442-3318 [employer]. [address] [place] [address], [place] [address]. All rights reserved. This information is not intended as a substitute for professional medical care. Always follow your healthcare professional's instructions.        Low-Salt Choices  Eating salt (sodium) can make your body retain too much water. Excess water makes your heart work harder. Canned, packaged, and frozen foods are easy to prepare, but they are often high in sodium. Here are some ideas for low-salt foods you  can easily prepare yourself.    For breakfast  · Fruit or 100% fruit juice  · Whole-wheat bread or an English muffin. Compare sodium content on labels.  · Low-fat milk or yogurt  · Unsalted eggs  · Shredded wheat  · Corn tortillas  · Unsalted steamed rice  · Regular (not instant) hot cereal, made without salt  Stay away from:  · Sausage, burgess, and ham  · Flour tortillas  · Packaged muffins, pancakes, and biscuits  · Instant hot cereals  · Cottage cheese  For lunch and dinner  · Fresh fish, chicken, turkey, or meat--baked, broiled, or roasted without salt  · Dry beans, cooked without salt  · Tofu, stir-fried without salt  · Unsalted fresh fruit and vegetables, or frozen or canned fruit and vegetables with no added salt  Stay away from:  · Lunch or deli meat that is cured or smoked  · Cheese  · Tomato juice and catsup  · Canned vegetables, soups, and fish not labeled as no-salt-added or reduced sodium  · Packaged gravies and sauces  · Olives, pickles, and relish  · Bottled salad dressings  For snacks and desserts  · Yogurt  · Unsalted, air popped popcorn  · Unsalted nuts or seeds  Stay away from:  · Pies and cakes  · Packaged dessert mixes  · Pizza  · Canned and packaged puddings  · Pretzels, chips, crackers, and nuts--unless the label says unsalted  Date Last Reviewed: 6/17/2015  © 5976-5420 [employer]. [address] [place] [address]. All rights reserved. This information is not intended as a substitute for professional medical care. Always follow your healthcare professional's instructions.        Understanding Chronic Venous Insufficiency  Problems with the veins in the legs may lead to chronic venous insufficiency (CVI). CVI means that there is a long-term problem with the veins not being able to pump blood back to your heart. When this happens, blood stays in the legs and causes swelling and aching.   Two problems that may lead to chronic venous insufficiency are:  · Damaged  valves. Valves keep blood flowing from the legs through the blood vessels and back to the heart. When the valves are damaged, blood does not flow as well.   · Deep vein thrombosis (DVT). Blood clots may form in the deep veins of the legs. This may cause pain, redness, and swelling in the legs. It may also block the flow of blood back to the heart. Seek immediate medical care if you have these symptoms.  · A blood clot in the leg can also break off and travel to the lungs. This is called pulmonary embolism (PE). In the lungs, the clot can cut off the flow of blood. This may cause chest pain, trouble breathing, sweating, a fast heartbeat, coughing (may cough up blood), and fainting. It is a medical emergency and may cause death. Call 911 if you have these symptoms.  · Healthcare providers call the two conditions, DVT and PE, venous thromboembolism (VTE).  CVI cant be cured, but you can control leg swelling to reduce the likelihood of ulcers (sores).  Recognizing the symptoms  Be aware of the following:  · If you stand or sit with your feet down for long periods, your legs may ache or feel heavy.  · Swollen ankles are possibly the most common symptom of CVI.  · As swelling increases, the skin over your ankles may show red spots or a brownish tinge. The skin may feel leathery or scaly, and may start to itch.  · If swelling is not controlled, an ulcer (open wound) may form.  What you can do  Reduce your risk of developing ulcers by doing the following:  · Increase blood flow back to your heart by elevating your legs, exercising daily, and wearing elastic stockings.  · Boost blood flow in your legs by losing excess weight.  · If you must stand or sit in one place for a period of time, keep your blood moving by wiggling your toes, shifting your body position, and rising up on the balls of your feet.    Date Last Reviewed: 5/1/2016  © 4999-3542 [employer]. [address] [place] [address], [place] [address]. All  rights reserved. This information is not intended as a substitute for professional medical care. Always follow your healthcare professional's instructions.

## 2018-09-20 NOTE — PROGRESS NOTES
Jaden Banegas MD [facility]                         2018    HPI:  Lola Lerner is a 78 y.o. female with   Patient Active Problem List   Diagnosis    Hypertension    Hyperlipidemia    Recurrent UTI    OAB (overactive bladder)    Malignant neoplasm of right kidney    being managed by PCP and specialists who is here today for evaluation of ***.  Patient states location is *** occurring for ***.  Associated signs and symptoms ***.  Quality is *** and severity is ***.  Symptoms began ***.  Alleviating factors ***.  Worsening factors ***.    *** MI  *** Stroke  Tobacco use: ***    Past Medical History:   Diagnosis Date    Hyperlipidemia     Hypertension     Urge incontinence     sees Dr. Julien, urology    Vaginal delivery     x3     Past Surgical History:   Procedure Laterality Date    BREAST BIOPSY Right     Core bx,. benign    CHOLECYSTECTOMY      NEPHRECTOMY-PARTIAL - open Right 2016    [facility]    PARTIAL NEPHRECTOMY       Family History   Problem Relation Age of Onset    Heart disease Mother      Social History     Socioeconomic History    Marital status:      Spouse name: Not on file    Number of children: Not on file    Years of education: Not on file    Highest education level: Not on file   Social Needs    Financial resource strain: Not on file    Food insecurity - worry: Not on file    Food insecurity - inability: Not on file    Transportation needs - medical: Not on file    Transportation needs - non-medical: Not on file   Occupational History    Not on file   Tobacco Use    Smoking status: Never Smoker    Smokeless tobacco: Never Used   Substance and Sexual Activity    Alcohol use: No    Drug use: No    Sexual activity: Not on file   Other Topics Concern    Not on file   Social History Narrative     x 50 yr.  .  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           Current Outpatient Medications:     aspirin (ECOTRIN) 81 MG EC tablet, Take 81 mg by mouth every evening. 1 Tablet, Delayed Release (E.C.) Oral Every day, Disp: , Rfl:     atenolol (TENORMIN) 25 MG tablet, Take 25 mg by mouth every morning.  Tablet Oral , Disp: , Rfl:     calcium carbonate-vitamin D3 600 mg(1,500mg) -100 unit Cap, Take by mouth. 1 Capsule Oral Twice a day, Disp: , Rfl:     losartan-hydrochlorothiazide 100-25 mg (HYZAAR) 100-25 mg per tablet, Take 1 tablet by mouth every morning. 1 Tablet Oral Every day, Disp: , Rfl:     multivitamin (ONE DAILY MULTIVITAMIN) per tablet, Take 1 tablet by mouth every morning. , Disp: , Rfl:     rosuvastatin (CRESTOR) 10 MG tablet, Take 10 mg by mouth every evening. 1 Tablet Oral Every day, Disp: , Rfl:     solifenacin (VESICARE) 10 MG tablet, Take 1 tablet (10 mg total) by mouth once daily., Disp: 90 tablet, Rfl: 3    REVIEW OF SYSTEMS:  General: No fevers or chills; ENT: No sore throat; Allergy and Immunology: no persistent infections; Hematological and Lymphatic: No history of bleeding or easy bruising; Endocrine: negative; Respiratory: no cough, shortness of breath, or wheezing; Cardiovascular: no chest pain or dyspnea on exertion; Gastrointestinal: no abdominal pain/back, change in bowel habits, or bloody stools; Genito-Urinary: no dysuria, trouble voiding, or hematuria; Musculoskeletal: negative; Neurological: no TIA or stroke symptoms; Psychiatric: no nervousness, anxiety or depression.    PHYSICAL EXAM:                General appearance:  Alert, well-appearing, and in no distress.  Oriented to person, place, and time                    Neurological: Normal speech, no focal findings noted; CN II - XII grossly intact. RLE with *** sensation to light touch, LLE with *** sensation to light touch.            Musculoskeletal: Digits/nail without cyanosis/clubbing.  Strength ***.                    Neck: Supple, no significant adenopathy, *** carotid bruit  can be auscultated                  Chest:  Clear to auscultation, no wheezes, rales or rhonchi, symmetric air entry. No use of accessory muscles               Cardiac: Normal rate and regular rhythm, S1 and S2 normal            Abdomen: Soft, nontender, nondistended, no masses or organomegaly, no hernia     No rebound tenderness noted; bowel sounds normal     No groin adenopathy      Extremities:   ***+ R femoral pulse, ***+ L femoral pulse     ***+ R popliteal pulse, ***+ L popliteal pulse     ***+ R PT pulse, ***+ L PT pulse     ***+ R DP pulse, ***+ L DP pulse     *** RLE edema, *** LLE edema    Skin: RLE ***; LLE ***    LAB RESULTS:  No results found for: CBC  Lab Results   Component Value Date    LABPROT 10.4 08/11/2016    INR 1.0 08/11/2016     Lab Results   Component Value Date     01/26/2017    K 4.2 01/26/2017     01/26/2017    CO2 27 01/26/2017     01/26/2017    BUN 14 01/26/2017    CREATININE 0.8 11/15/2017    CALCIUM 9.6 01/26/2017    ANIONGAP 9 01/26/2017    EGFRNONAA >60 11/15/2017     Lab Results   Component Value Date    WBC 8.50 08/20/2016    RBC 4.28 08/20/2016    HGB 13.1 08/20/2016    HCT 39.5 08/20/2016    MCV 92 08/20/2016    MCH 30.6 08/20/2016    MCHC 33.2 08/20/2016    RDW 13.3 08/20/2016     08/20/2016    MPV 10.9 08/20/2016    GRAN 5.7 08/20/2016    GRAN 67.5 08/20/2016    LYMPH 1.6 08/20/2016    LYMPH 19.3 08/20/2016    MONO 0.9 08/20/2016    MONO 10.5 08/20/2016    EOS 0.2 08/20/2016    BASO 0.01 08/20/2016    EOSINOPHIL 2.6 08/20/2016    BASOPHIL 0.1 08/20/2016    DIFFMETHOD Automated 08/20/2016     .No results found for: HGBA1C    IMAGING:  All pertinent imaging has been reviewed and interpreted independently.    IMP/PLAN:  78 y.o. female with   Patient Active Problem List   Diagnosis    Hypertension    Hyperlipidemia    Recurrent UTI    OAB (overactive bladder)    Malignant neoplasm of right kidney    being managed by PCP and specialists who is here  today for evaluation of ***.      I spent *** minutes evaluating this patient and greater than 50% of the time was spent counseling, coordinator care and discussing the plan of care.  All questions were answered and patient stated understanding with agreement with the above treatment plan.    Jaden Banegas MD [facility]  Vascular and Endovascular Surgery

## 2018-09-20 NOTE — LETTER
September 20, 2018      Kaleigh Izquierdo DPM  [address]  [place]  [facility] [address]           [facility]  [address] [facility] [address]  [place] [address]  Phone: [phone]  Fax: [phone]          Patient: Lola Lerner   MR Number: [id number]   YOB: 1940   Date of Visit: 9/20/2018       Dear Dr. Kaleigh Izquierdo:    Thank you for referring Lola Lerner to me for evaluation. Attached you will find relevant portions of my assessment and plan of care.    If you have questions, please do not hesitate to call me. I look forward to following Lola Lerner along with you.    Sincerely,    Jaden Banegas MD    Enclosure  CC:  No Recipients    If you would like to receive this communication electronically, please contact [email or web address] or [phone] to request more information on [facility] access.    For providers and/or their staff who would like to refer a patient to [facility], please contact us through our one-stop-shop provider referral line, [facility], at [phone].    If you feel you have received this communication in error or would no longer like to receive these types of communications, please e-mail [email or web address]

## 2018-10-01 DIAGNOSIS — N32.81 OAB (OVERACTIVE BLADDER): ICD-10-CM

## 2018-10-01 RX ORDER — SOLIFENACIN SUCCINATE 10 MG/1
TABLET, FILM COATED ORAL
Qty: 90 TABLET | Refills: 3 | Status: SHIPPED | OUTPATIENT
Start: 2018-10-01 | End: 2019-09-27 | Stop reason: SDUPTHER

## 2018-11-30 ENCOUNTER — HOSPITAL ENCOUNTER (OUTPATIENT)
Dept: RADIOLOGY | Facility: HOSPITAL | Age: 78
Discharge: HOME | End: 2018-11-30
Attending: UROLOGY
Payer: MEDICARE

## 2018-11-30 DIAGNOSIS — C64.1 MALIGNANT NEOPLASM OF RIGHT KIDNEY: ICD-10-CM

## 2018-11-30 PROCEDURE — 74178 CT ABD&PLV WO CNTR FLWD CNTR: CPT | Mod: TC

## 2018-11-30 PROCEDURE — 71260 CT THORAX DX C+: CPT | Mod: 26,,, | Performed by: RADIOLOGY

## 2018-11-30 PROCEDURE — 74178 CT ABD&PLV WO CNTR FLWD CNTR: CPT | Mod: 26,,, | Performed by: RADIOLOGY

## 2018-11-30 PROCEDURE — 71260 CT THORAX DX C+: CPT | Mod: TC

## 2018-11-30 PROCEDURE — 25500020 PHARM REV CODE 255: Performed by: UROLOGY

## 2018-11-30 RX ADMIN — IOHEXOL 15 ML: 300 INJECTION, SOLUTION INTRAVENOUS at 01:11

## 2018-11-30 RX ADMIN — IOHEXOL 100 ML: 350 INJECTION, SOLUTION INTRAVENOUS at 01:11

## 2018-12-06 ENCOUNTER — OFFICE VISIT (OUTPATIENT)
Dept: UROLOGY | Facility: CLINIC | Age: 78
End: 2018-12-06
Payer: MEDICARE

## 2018-12-06 VITALS
SYSTOLIC BLOOD PRESSURE: 128 MMHG | BODY MASS INDEX: 22.91 KG/M2 | HEART RATE: 74 BPM | HEIGHT: 67 IN | WEIGHT: 146 LBS | DIASTOLIC BLOOD PRESSURE: 76 MMHG | RESPIRATION RATE: 17 BRPM

## 2018-12-06 DIAGNOSIS — C64.1 MALIGNANT NEOPLASM OF RIGHT KIDNEY: Primary | ICD-10-CM

## 2018-12-06 DIAGNOSIS — N39.0 RECURRENT UTI: ICD-10-CM

## 2018-12-06 DIAGNOSIS — N32.81 OAB (OVERACTIVE BLADDER): ICD-10-CM

## 2018-12-06 PROCEDURE — 99999 PR PBB SHADOW E&M-EST. PATIENT-LVL III: CPT | Mod: PBBFAC,,, | Performed by: UROLOGY

## 2018-12-06 PROCEDURE — 99213 OFFICE O/P EST LOW 20 MIN: CPT | Mod: PBBFAC | Performed by: UROLOGY

## 2018-12-06 PROCEDURE — 99214 OFFICE O/P EST MOD 30 MIN: CPT | Mod: S$PBB,,, | Performed by: UROLOGY

## 2018-12-06 RX ORDER — METOPROLOL SUCCINATE 25 MG/1
25 TABLET, EXTENDED RELEASE ORAL DAILY
COMMUNITY
Start: 2018-10-23

## 2018-12-06 RX ORDER — LEVOTHYROXINE SODIUM 25 UG/1
TABLET ORAL
COMMUNITY
Start: 2018-10-02 | End: 2021-05-05

## 2018-12-06 NOTE — PROGRESS NOTES
Subjective:       Lola Lerner is a 78 y.o. female who is an established patient who was self-referred for evaluation of OAB/UTIs and RCC.      Recurrent UTIs  She reports recurrent UTIs. No recent UCx in Epic to review. She reports this happens about 3 times a year. She has classic UTI symptoms - dysuria, frequency. Denies fevers. Denies constipation. No h/o stones.      She also has issues with OAB. Reports urgency and frequency. Nocturia x 2. UUI common usually due to waiting to void. Denies SAMSON. She is on Vesicare 10mg now for about 3 years. This medication helps a lot.     She was treated for UTI after last visit in 4/16 (>100k E coli).    RCC  VICTORINA was done prior to last visit for UTI workup and noted a 3.5cm solid R renal mass. PVR 66cc. No family history of  malignancy. No gross hematuria.      CT scan shows an enhancing 4.6cm R UP renal mass. CXR showed abnormal area in R lung. CT chest showed likely inflammatory scarring, doubt metastasis.    She is now s/p R open partial nephrectomy on 8/17/16. She did well after surgery.    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   CT c/a/p 5/17 - post-op changes R kidney, RUL lung area resolved, now with 2mm nodule in RML lung. CT c/a/p 11/17 - stable R kidney, micronodules in R lung  CT c/a/p 11/18 - R renal scar, stable micronodules in lung       The following portions of the patient's history were reviewed and updated as appropriate: allergies, current medications, past family history, past medical history, past social history, past surgical history and problem list.    Review of Systems  Constitutional: no fever or chills  ENT: no nasal congestion or sore throat  Respiratory: no cough or shortness of breath  Cardiovascular: no chest pain or palpitations  Gastrointestinal: no nausea or vomiting, tolerating diet  Genitourinary: as per  "HPI  Hematologic/Lymphatic: no easy bruising or lymphadenopathy  Musculoskeletal: no arthralgias or myalgias  Skin: no rashes or lesions  Neurological: no seizures or tremors  Behavioral/Psych: no auditory or visual hallucinations       Objective:    Vitals:   /76   Pulse 74   Resp 17   Ht 5' 7" (1.702 m)   Wt 66.2 kg (146 lb)   BMI 22.87 kg/m²     Physical Exam   General: well developed, well nourished in no acute distress  Head: normocephalic, atraumatic  Neck: supple, trachea midline, no obvious enlargement of thyroid  HEENT: EOMI, mucus membranes moist, sclera anicteric, no hearing impairment  Lungs: symmetric expansion, non-labored breathing  Cardiovascular: regular rate and rhythm, normal pulses  Abdomen: soft, non tender, non distended, no palpable masses, no hepatosplenomegaly, no hernias, no CVA tenderness  Musculoskeletal: no peripheral edema, normal ROM in bilateral upper and lower extremities  Lymphatics: no cervical or inguinal lymphadenopathy  Skin: no rashes or lesions  Neuro: alert and oriented x 3, no gross deficits  Psych: normal judgment and insight, normal mood/affect and non-anxious  Genitourinary:   patient declined exam    Inc R flank - well healed      Lab Review   Urine analysis today in clinic shows - negative    Lab Results   Component Value Date    WBC 8.50 08/20/2016    HGB 13.1 08/20/2016    HCT 39.5 08/20/2016    MCV 92 08/20/2016     08/20/2016     Lab Results   Component Value Date    CREATININE 0.7 11/30/2018    BUN 14 01/26/2017         Imaging  Images and reports were personally reviewed by me and discussed with patient  VICTORINA reviewed  CT reviewed       Assessment/Plan:      1. Recurrent UTI    - VICTORINA with PVR - R renal mass, mildly elevated PVR 66cc   - Recommended Estrace, she declined this.    - Improving UUI will also likely help improve UTIs     2. OAB (overactive bladder)    - Continue Vesicare, doing well with this.   - Offered Myrbetriq in addition to " Vesicare, she declined.     3. Malignant neoplasm of R kidney   - 3.5cm solid renal mass noted on VICTORINA   - CT confirms 4.8cm enhacing RUP mass    - s/p open R partial nephrectomy on 8/17/16   - Pathology: ccRCC, 4.2cm, pT1b, FG 2, neg margins   - CT c/a/p 11/16 - stable 1.6cm area in RUL lung (inflammatory), likely post-op changes in R kidney.    - CT c/a/p 5/17 - R kidney stable, new 2mm nodule in RML lung.    - CT c/a/p 11/17 - R kidney stable, micronodules of R lung. Recheck in 1 year.   - CT c/a/p 11/18 - R kidney scar, stable; micronodules in R lung - stable (likely scarring/benign)   - Annual CT/CXR until 2021      Follow up in 12 months with CT scan

## 2019-09-17 ENCOUNTER — OFFICE VISIT (OUTPATIENT)
Dept: UROLOGY | Facility: CLINIC | Age: 79
End: 2019-09-17
Payer: MEDICARE

## 2019-09-17 VITALS
RESPIRATION RATE: 16 BRPM | HEIGHT: 67 IN | HEART RATE: 58 BPM | BODY MASS INDEX: 27.41 KG/M2 | WEIGHT: 174.63 LBS | SYSTOLIC BLOOD PRESSURE: 135 MMHG | DIASTOLIC BLOOD PRESSURE: 66 MMHG

## 2019-09-17 DIAGNOSIS — N39.0 RECURRENT UTI: ICD-10-CM

## 2019-09-17 DIAGNOSIS — C64.1 MALIGNANT NEOPLASM OF RIGHT KIDNEY: Primary | ICD-10-CM

## 2019-09-17 DIAGNOSIS — N32.81 OAB (OVERACTIVE BLADDER): ICD-10-CM

## 2019-09-17 PROCEDURE — 87186 SC STD MICRODIL/AGAR DIL: CPT

## 2019-09-17 PROCEDURE — 99214 OFFICE O/P EST MOD 30 MIN: CPT | Mod: S$PBB,,, | Performed by: UROLOGY

## 2019-09-17 PROCEDURE — 99999 PR PBB SHADOW E&M-EST. PATIENT-LVL III: CPT | Mod: PBBFAC,,, | Performed by: UROLOGY

## 2019-09-17 PROCEDURE — 99213 OFFICE O/P EST LOW 20 MIN: CPT | Mod: PBBFAC | Performed by: UROLOGY

## 2019-09-17 PROCEDURE — 99214 PR OFFICE/OUTPT VISIT, EST, LEVL IV, 30-39 MIN: ICD-10-PCS | Mod: S$PBB,,, | Performed by: UROLOGY

## 2019-09-17 PROCEDURE — 87077 CULTURE AEROBIC IDENTIFY: CPT

## 2019-09-17 PROCEDURE — 87088 URINE BACTERIA CULTURE: CPT

## 2019-09-17 PROCEDURE — 99999 PR PBB SHADOW E&M-EST. PATIENT-LVL III: ICD-10-PCS | Mod: PBBFAC,,, | Performed by: UROLOGY

## 2019-09-17 PROCEDURE — 87086 URINE CULTURE/COLONY COUNT: CPT

## 2019-09-17 RX ORDER — DONEPEZIL HYDROCHLORIDE 10 MG/1
TABLET, FILM COATED ORAL
COMMUNITY
Start: 2019-09-04 | End: 2022-09-01 | Stop reason: SDUPTHER

## 2019-09-17 RX ORDER — CEPHALEXIN 500 MG/1
500 CAPSULE ORAL EVERY 8 HOURS
Qty: 30 CAPSULE | Refills: 0 | Status: SHIPPED | OUTPATIENT
Start: 2019-09-17 | End: 2019-09-27

## 2019-09-17 NOTE — PROGRESS NOTES
Subjective:       Lola Lerner is a 79 y.o. female who is an established patient who was self-referred for evaluation of OAB/UTIs and RCC.      Recurrent UTIs  She reports recurrent UTIs. No recent UCx in Epic to review. She reports this happens about 3 times a year. She has classic UTI symptoms - dysuria, frequency. Denies fevers. Denies constipation. No h/o stones.      She also has issues with OAB. Reports urgency and frequency. Nocturia x 2. UUI common usually due to waiting to void. Denies SAMSON. She is on Vesicare 10mg now for about 3 years. This medication helps a lot.     She was treated for UTI after last visit in 4/16 (>100k E coli).    Returns with c/o worsening OAB - more frequency and urgency, no UUI. Nocturia x 4. Still taking Vesicare 10mg. UA concerning for UTI. Denies dysuria. Symptoms worsened x 2 weeks.       RCC  VICTORINA was done prior to last visit for UTI workup and noted a 3.5cm solid R renal mass. PVR 66cc. No family history of  malignancy. No gross hematuria.      CT scan shows an enhancing 4.6cm R UP renal mass. CXR showed abnormal area in R lung. CT chest showed likely inflammatory scarring, doubt metastasis.    She is now s/p R open partial nephrectomy on 8/17/16. She did well after surgery.    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   CT c/a/p 5/17 - post-op changes R kidney, RUL lung area resolved, now with 2mm nodule in RML lung. CT c/a/p 11/17 - stable R kidney, micronodules in R lung  CT c/a/p 11/18 - R renal scar, stable micronodules in lung       The following portions of the patient's history were reviewed and updated as appropriate: allergies, current medications, past family history, past medical history, past social history, past surgical history and problem list.    Review of Systems  Constitutional: no fever or chills  ENT: no nasal congestion or sore  "throat  Respiratory: no cough or shortness of breath  Cardiovascular: no chest pain or palpitations  Gastrointestinal: no nausea or vomiting, tolerating diet  Genitourinary: as per HPI  Hematologic/Lymphatic: no easy bruising or lymphadenopathy  Musculoskeletal: no arthralgias or myalgias  Skin: no rashes or lesions  Neurological: no seizures or tremors  Behavioral/Psych: no auditory or visual hallucinations       Objective:    Vitals:   /66   Pulse (!) 58   Resp 16   Ht 5' 7" (1.702 m)   Wt 79.2 kg (174 lb 9.7 oz)   BMI 27.35 kg/m²     Physical Exam   General: well developed, well nourished in no acute distress  Head: normocephalic, atraumatic  Neck: supple, trachea midline, no obvious enlargement of thyroid  HEENT: EOMI, mucus membranes moist, sclera anicteric, no hearing impairment  Lungs: symmetric expansion, non-labored breathing  Cardiovascular: regular rate and rhythm, normal pulses  Abdomen: soft, non tender, non distended, no palpable masses, no hepatosplenomegaly, no hernias, no CVA tenderness  Musculoskeletal: no peripheral edema, normal ROM in bilateral upper and lower extremities  Lymphatics: no cervical or inguinal lymphadenopathy  Skin: no rashes or lesions  Neuro: alert and oriented x 3, no gross deficits  Psych: normal judgment and insight, normal mood/affect and non-anxious  Genitourinary:   patient declined exam    Inc R flank - well healed      Lab Review   Urine analysis today in clinic shows - ++LE, +nit, 50 RBCs    Lab Results   Component Value Date    WBC 8.50 08/20/2016    HGB 13.1 08/20/2016    HCT 39.5 08/20/2016    MCV 92 08/20/2016     08/20/2016     Lab Results   Component Value Date    CREATININE 0.7 11/30/2018    BUN 14 01/26/2017         Imaging  Images and reports were personally reviewed by me and discussed with patient  VICTORINA reviewed  CT reviewed       Assessment/Plan:      1. Recurrent UTI    - VICTORINA with PVR - R renal mass, mildly elevated PVR 66cc   - Recommended " Estrace, she declined this.    - Improving UUI will also likely help improve UTIs     2. OAB (overactive bladder)    - Continue Vesicare, doing well with this.   - Offered Myrbetriq in addition to Vesicare, she declined.   - Suspicious UTI is causing worsening OAB now   - Empiric Keflex today   - UCx today     3. Malignant neoplasm of R kidney   - 3.5cm solid renal mass noted on VICTORINA   - CT confirms 4.8cm enhacing RUP mass    - s/p open R partial nephrectomy on 8/17/16   - Pathology: ccRCC, 4.2cm, pT1b, FG 2, neg margins   - CT c/a/p 11/16 - stable 1.6cm area in RUL lung (inflammatory), likely post-op changes in R kidney.    - CT c/a/p 5/17 - R kidney stable, new 2mm nodule in RML lung.    - CT c/a/p 11/17 - R kidney stable, micronodules of R lung. Recheck in 1 year.   - CT c/a/p 11/18 - R kidney scar, stable; micronodules in R lung - stable (likely scarring/benign)   - Annual CT/CXR until 2021 - due 11/19      Follow up in 2 months with CT scan, f/u bladder symptoms

## 2019-09-19 ENCOUNTER — TELEPHONE (OUTPATIENT)
Dept: UROLOGY | Facility: CLINIC | Age: 79
End: 2019-09-19

## 2019-09-19 LAB — BACTERIA UR CULT: ABNORMAL

## 2019-09-19 NOTE — TELEPHONE ENCOUNTER
----- Message from Shameka Connor sent at 9/19/2019  2:49 PM CDT -----  Contact: Self/  [phone]  Type: Patient Call Back    Who called:  Patient    What is the request in detail:  Patient returned staffs call, would like staff to give her a call.  Thank  You.    Would the patient rather a call back or a response via [facility]?  Call back    Best call back number:  [phone] or [phone]

## 2019-09-19 NOTE — TELEPHONE ENCOUNTER
Spoke to pt's spouse advised do to urine culture being positive the antibiotics that was given by  she would like pt to finish until complete. berman states he fully understands an will relay message to patient.-noel

## 2019-09-19 NOTE — TELEPHONE ENCOUNTER
----- Message from Ashley Mars sent at 9/19/2019  3:48 PM CDT -----  Contact: Self   Type: Patient Call Back    Who called: Self    What is the request in detail: Patient is asking to speak with the office     Can the clinic reply by [facility]? Call     Would the patient rather a call back or a response via [facility]? Call     Best call back number: [phone]    Additional Information:

## 2019-09-19 NOTE — TELEPHONE ENCOUNTER
Spoke to pt she just wanted to confirm to finish keflex until complete do to a uti. I advised her this was correct.-noel

## 2019-09-27 DIAGNOSIS — N32.81 OAB (OVERACTIVE BLADDER): ICD-10-CM

## 2019-09-27 RX ORDER — SOLIFENACIN SUCCINATE 10 MG/1
TABLET, FILM COATED ORAL
Qty: 90 TABLET | Refills: 4 | Status: SHIPPED | OUTPATIENT
Start: 2019-09-27 | End: 2019-11-14

## 2019-11-08 ENCOUNTER — TELEPHONE (OUTPATIENT)
Dept: UROLOGY | Facility: CLINIC | Age: 79
End: 2019-11-08

## 2019-11-08 DIAGNOSIS — C64.9 MALIGNANT NEOPLASM OF KIDNEY, UNSPECIFIED LATERALITY: Primary | ICD-10-CM

## 2019-11-08 NOTE — TELEPHONE ENCOUNTER
----- Message from Rama Singleton sent at 11/8/2019  9:01 AM CST -----  Patient is coming in on Monday 11/11 for a Ct scan patient will need lab orders for Creatinine please. Thanks

## 2019-11-11 ENCOUNTER — HOSPITAL ENCOUNTER (OUTPATIENT)
Dept: RADIOLOGY | Facility: HOSPITAL | Age: 79
Discharge: HOME | End: 2019-11-11
Attending: UROLOGY
Payer: MEDICARE

## 2019-11-11 DIAGNOSIS — C64.1 MALIGNANT NEOPLASM OF RIGHT KIDNEY: ICD-10-CM

## 2019-11-11 PROCEDURE — 25500020 PHARM REV CODE 255: Performed by: UROLOGY

## 2019-11-11 PROCEDURE — 74178 CT ABD&PLV WO CNTR FLWD CNTR: CPT | Mod: 26,,, | Performed by: RADIOLOGY

## 2019-11-11 PROCEDURE — 74178 CT ABD&PLV WO CNTR FLWD CNTR: CPT | Mod: TC

## 2019-11-11 PROCEDURE — 74178 CT ABDOMEN PELVIS W WO CONTRAST: ICD-10-PCS | Mod: 26,,, | Performed by: RADIOLOGY

## 2019-11-11 PROCEDURE — 71046 X-RAY EXAM CHEST 2 VIEWS: CPT | Mod: TC,FY

## 2019-11-11 PROCEDURE — 71046 X-RAY EXAM CHEST 2 VIEWS: CPT | Mod: 26,,, | Performed by: RADIOLOGY

## 2019-11-11 PROCEDURE — 71046 XR CHEST PA AND LATERAL: ICD-10-PCS | Mod: 26,,, | Performed by: RADIOLOGY

## 2019-11-11 RX ADMIN — IOHEXOL 75 ML: 350 INJECTION, SOLUTION INTRAVENOUS at 11:11

## 2019-11-14 ENCOUNTER — OFFICE VISIT (OUTPATIENT)
Dept: UROLOGY | Facility: CLINIC | Age: 79
End: 2019-11-14
Payer: MEDICARE

## 2019-11-14 VITALS
SYSTOLIC BLOOD PRESSURE: 106 MMHG | HEIGHT: 67 IN | HEART RATE: 64 BPM | BODY MASS INDEX: 27.47 KG/M2 | RESPIRATION RATE: 15 BRPM | DIASTOLIC BLOOD PRESSURE: 60 MMHG | WEIGHT: 175 LBS

## 2019-11-14 DIAGNOSIS — C64.1 MALIGNANT NEOPLASM OF RIGHT KIDNEY: Primary | ICD-10-CM

## 2019-11-14 DIAGNOSIS — N39.0 RECURRENT UTI: ICD-10-CM

## 2019-11-14 DIAGNOSIS — N32.81 OAB (OVERACTIVE BLADDER): ICD-10-CM

## 2019-11-14 PROCEDURE — 99214 PR OFFICE/OUTPT VISIT, EST, LEVL IV, 30-39 MIN: ICD-10-PCS | Mod: S$PBB,,, | Performed by: UROLOGY

## 2019-11-14 PROCEDURE — 99213 OFFICE O/P EST LOW 20 MIN: CPT | Mod: PBBFAC | Performed by: UROLOGY

## 2019-11-14 PROCEDURE — 99999 PR PBB SHADOW E&M-EST. PATIENT-LVL III: ICD-10-PCS | Mod: PBBFAC,,, | Performed by: UROLOGY

## 2019-11-14 PROCEDURE — 99214 OFFICE O/P EST MOD 30 MIN: CPT | Mod: S$PBB,,, | Performed by: UROLOGY

## 2019-11-14 PROCEDURE — 99999 PR PBB SHADOW E&M-EST. PATIENT-LVL III: CPT | Mod: PBBFAC,,, | Performed by: UROLOGY

## 2019-11-14 NOTE — PROGRESS NOTES
Subjective:       Lola Lerner is a 79 y.o. female who is an established patient who was self-referred for evaluation of OAB/UTIs and RCC.      Recurrent UTIs  She reports recurrent UTIs. No recent UCx in Epic to review. She reports this happens about 3 times a year. She has classic UTI symptoms - dysuria, frequency. Denies fevers. Denies constipation. No h/o stones.      She also has issues with OAB. Reports urgency and frequency. Nocturia x 2. UUI common usually due to waiting to void. Denies SAMSON. She is on Vesicare 10mg now for about 3 years. This medication helps a lot.     She was treated for UTI after last visit in 4/16 (>100k E coli).    Returns with c/o worsening OAB - more frequency and urgency, no UUI. Nocturia x 4. Still taking Vesicare 10mg. UA concerning for UTI. Denies dysuria. Symptoms worsened x 2 weeks - she was treated for UTI at that time.       RCC  VICTORINA was done prior to last visit for UTI workup and noted a 3.5cm solid R renal mass. PVR 66cc. No family history of  malignancy. No gross hematuria.      CT scan shows an enhancing 4.6cm R UP renal mass. CXR showed abnormal area in R lung. CT chest showed likely inflammatory scarring, doubt metastasis.    She is now s/p R open partial nephrectomy on 8/17/16. She did well after surgery.    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   CT c/a/p 5/17 - post-op changes R kidney, RUL lung area resolved, now with 2mm nodule in RML lung. CT c/a/p 11/17 - stable R kidney, micronodules in R lung  CT c/a/p 11/18 - R renal scar, stable micronodules in lung  CT a/p 11/19 - R renal scar, no mets/recurrence, stable micronodules in lung, CXR - wnl       The following portions of the patient's history were reviewed and updated as appropriate: allergies, current medications, past family history, past medical history, past social history,  "past surgical history and problem list.    Review of Systems  Constitutional: no fever or chills  ENT: no nasal congestion or sore throat  Respiratory: no cough or shortness of breath  Cardiovascular: no chest pain or palpitations  Gastrointestinal: no nausea or vomiting, tolerating diet  Genitourinary: as per HPI  Hematologic/Lymphatic: no easy bruising or lymphadenopathy  Musculoskeletal: no arthralgias or myalgias  Skin: no rashes or lesions  Neurological: no seizures or tremors  Behavioral/Psych: no auditory or visual hallucinations       Objective:    Vitals:   /60   Pulse 64   Resp 15   Ht 5' 7" (1.702 m)   Wt 79.4 kg (175 lb)   BMI 27.41 kg/m²     Physical Exam   General: well developed, well nourished in no acute distress  Head: normocephalic, atraumatic  Neck: supple, trachea midline, no obvious enlargement of thyroid  HEENT: EOMI, mucus membranes moist, sclera anicteric, no hearing impairment  Lungs: symmetric expansion, non-labored breathing  Cardiovascular: regular rate and rhythm, normal pulses  Abdomen: soft, non tender, non distended, no palpable masses, no hepatosplenomegaly, no hernias, no CVA tenderness  Musculoskeletal: no peripheral edema, normal ROM in bilateral upper and lower extremities  Lymphatics: no cervical or inguinal lymphadenopathy  Skin: no rashes or lesions  Neuro: alert and oriented x 3, no gross deficits  Psych: normal judgment and insight, normal mood/affect and non-anxious  Genitourinary:   patient declined exam    Inc R flank - well healed      Lab Review   Urine analysis today in clinic shows - negative    Lab Results   Component Value Date    WBC 8.50 08/20/2016    HGB 13.1 08/20/2016    HCT 39.5 08/20/2016    MCV 92 08/20/2016     08/20/2016     Lab Results   Component Value Date    CREATININE 0.9 11/11/2019    BUN 15 11/11/2019         Imaging  Images and reports were personally reviewed by me and discussed with patient  VICTORINA reviewed  CT reviewed   "     Assessment/Plan:      1. Recurrent UTI    - VICTORINA with PVR - R renal mass, mildly elevated PVR 66cc   - Recommended Estrace, she declined this.    - Improving UUI will also likely help improve UTIs     2. OAB (overactive bladder)    - Not doing as well on Vesicare, will trial change   - Offered Myrbetriq in addition to Vesicare, she declined.   - Worsened previously 2/2 UTI - treated     3. Malignant neoplasm of R kidney   - 3.5cm solid renal mass noted on VICTORINA   - CT confirms 4.8cm enhacing RUP mass    - s/p open R partial nephrectomy on 8/17/16   - Pathology: ccRCC, 4.2cm, pT1b, FG 2, neg margins   - CT c/a/p 11/16 - stable 1.6cm area in RUL lung (inflammatory), likely post-op changes in R kidney.    - CT c/a/p 5/17 - R kidney stable, new 2mm nodule in RML lung.    - CT c/a/p 11/17 - R kidney stable, micronodules of R lung. Recheck in 1 year.   - CT c/a/p 11/18 - R kidney scar, stable; micronodules in R lung - stable (likely scarring/benign)   - Annual CT/CXR until 2021 - due 11/20        Follow up in 3 months with PVR, 12 months with CT scan/CXR

## 2019-11-19 ENCOUNTER — TELEPHONE (OUTPATIENT)
Dept: UROLOGY | Facility: CLINIC | Age: 79
End: 2019-11-19

## 2019-11-19 RX ORDER — TROSPIUM CHLORIDE ER 60 MG/1
60 CAPSULE ORAL DAILY
Qty: 90 CAPSULE | Refills: 3 | Status: SHIPPED | OUTPATIENT
Start: 2019-11-19 | End: 2020-06-03 | Stop reason: SDUPTHER

## 2019-11-19 NOTE — TELEPHONE ENCOUNTER
Myrbetriq is not covered by patient insurance. Insurance suggests Oxybutynin IR or ER or Trospium IR or ER.

## 2019-11-25 ENCOUNTER — TELEPHONE (OUTPATIENT)
Dept: UROLOGY | Facility: CLINIC | Age: 79
End: 2019-11-25

## 2019-11-25 NOTE — TELEPHONE ENCOUNTER
----- Message from Azalia Romero sent at 11/25/2019 12:48 PM CST -----  Contact: Patient   Type: RX Refill Request    Who Called: Patient     Have you contacted your pharmacy: yes    Refill or New Rx: Refill     RX Name and Strength:VESICARE 10 mg tablet (an alternative for this med)    How is the patient currently taking it? (ex. 1XDay):    Is this a 30 day or 90 day RX:    Preferred Pharmacy with phone number:    [facility] [address] [address]  Phone: [phone] Fax: [phone]      Local or Mail Order: Local     Ordering Provider: Dr. Bell    Would the patient rather a call back or a response via [facility]? Call back     Best Call Back Number: [phone]    Pt is requesting a call back as well

## 2020-02-17 ENCOUNTER — OFFICE VISIT (OUTPATIENT)
Dept: UROLOGY | Facility: CLINIC | Age: 80
End: 2020-02-17
Payer: MEDICARE

## 2020-02-17 VITALS
BODY MASS INDEX: 27.61 KG/M2 | DIASTOLIC BLOOD PRESSURE: 82 MMHG | HEIGHT: 67 IN | SYSTOLIC BLOOD PRESSURE: 132 MMHG | WEIGHT: 175.94 LBS

## 2020-02-17 DIAGNOSIS — C64.1 MALIGNANT NEOPLASM OF RIGHT KIDNEY: ICD-10-CM

## 2020-02-17 DIAGNOSIS — N39.0 RECURRENT UTI: Primary | ICD-10-CM

## 2020-02-17 DIAGNOSIS — N32.81 OAB (OVERACTIVE BLADDER): ICD-10-CM

## 2020-02-17 PROCEDURE — 99999 PR PBB SHADOW E&M-EST. PATIENT-LVL III: CPT | Mod: PBBFAC,,, | Performed by: UROLOGY

## 2020-02-17 PROCEDURE — 99214 PR OFFICE/OUTPT VISIT, EST, LEVL IV, 30-39 MIN: ICD-10-PCS | Mod: S$PBB,,, | Performed by: UROLOGY

## 2020-02-17 PROCEDURE — 87077 CULTURE AEROBIC IDENTIFY: CPT

## 2020-02-17 PROCEDURE — 99213 OFFICE O/P EST LOW 20 MIN: CPT | Mod: PBBFAC | Performed by: UROLOGY

## 2020-02-17 PROCEDURE — 99999 PR PBB SHADOW E&M-EST. PATIENT-LVL III: ICD-10-PCS | Mod: PBBFAC,,, | Performed by: UROLOGY

## 2020-02-17 PROCEDURE — 87086 URINE CULTURE/COLONY COUNT: CPT

## 2020-02-17 PROCEDURE — 87186 SC STD MICRODIL/AGAR DIL: CPT

## 2020-02-17 PROCEDURE — 81002 URINALYSIS NONAUTO W/O SCOPE: CPT | Mod: PBBFAC | Performed by: UROLOGY

## 2020-02-17 PROCEDURE — 87088 URINE BACTERIA CULTURE: CPT

## 2020-02-17 PROCEDURE — 99214 OFFICE O/P EST MOD 30 MIN: CPT | Mod: S$PBB,,, | Performed by: UROLOGY

## 2020-02-17 PROCEDURE — 51798 US URINE CAPACITY MEASURE: CPT | Mod: PBBFAC | Performed by: UROLOGY

## 2020-02-17 NOTE — PROGRESS NOTES
Subjective:       Lola Lerner is a 79 y.o. female who is an established patient who was self-referred for evaluation of OAB/UTIs and RCC.      Recurrent UTIs  She reports recurrent UTIs. No recent UCx in Epic to review. She reports this happens about 3 times a year. She has classic UTI symptoms - dysuria, frequency. Denies fevers. Denies constipation. No h/o stones.      She also has issues with OAB. Reports urgency and frequency. Nocturia x 2. UUI common usually due to waiting to void. Denies SAMSON. She is on Vesicare 10mg now for about 3 years. This medication helps a lot.     She was treated for UTI after last visit in 4/16 (>100k E coli).    Returns with c/o worsening OAB - more frequency and urgency, no UUI. Nocturia x 4. Still taking Vesicare 10mg. UA concerning for UTI. Denies dysuria. Symptoms worsened x 2 weeks - she was treated for UTI at that time.     More issues with UI and UTIs. Now on Sanctura (was on Vesicare) - seems to be working slightly better. Denies UTI symptoms.   PVR (bladder scan) today - 0cc      RCC  VICTORINA was done prior to last visit for UTI workup and noted a 3.5cm solid R renal mass. PVR 66cc. No family history of  malignancy. No gross hematuria.      CT scan shows an enhancing 4.6cm R UP renal mass. CXR showed abnormal area in R lung. CT chest showed likely inflammatory scarring, doubt metastasis.    She is now s/p R open partial nephrectomy on 8/17/16. She did well after surgery.    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   CT c/a/p 5/17 - post-op changes R kidney, RUL lung area resolved, now with 2mm nodule in RML lung. CT c/a/p 11/17 - stable R kidney, micronodules in R lung  CT c/a/p 11/18 - R renal scar, stable micronodules in lung  CT a/p 11/19 - R renal scar, no mets/recurrence, stable micronodules in lung, CXR - wnl       The following portions of  "the patient's history were reviewed and updated as appropriate: allergies, current medications, past family history, past medical history, past social history, past surgical history and problem list.    Review of Systems  Constitutional: no fever or chills  ENT: no nasal congestion or sore throat  Respiratory: no cough or shortness of breath  Cardiovascular: no chest pain or palpitations  Gastrointestinal: no nausea or vomiting, tolerating diet  Genitourinary: as per HPI  Hematologic/Lymphatic: no easy bruising or lymphadenopathy  Musculoskeletal: no arthralgias or myalgias  Skin: no rashes or lesions  Neurological: no seizures or tremors  Behavioral/Psych: no auditory or visual hallucinations       Objective:    Vitals:   /82   Ht 5' 7" (1.702 m)   Wt 79.8 kg (175 lb 14.8 oz)   BMI 27.55 kg/m²     Physical Exam   General: well developed, well nourished in no acute distress  Head: normocephalic, atraumatic  Neck: supple, trachea midline, no obvious enlargement of thyroid  HEENT: EOMI, mucus membranes moist, sclera anicteric, no hearing impairment  Lungs: symmetric expansion, non-labored breathing  Cardiovascular: regular rate and rhythm, normal pulses  Abdomen: soft, non tender, non distended, no palpable masses, no hepatosplenomegaly, no hernias, no CVA tenderness  Musculoskeletal: no peripheral edema, normal ROM in bilateral upper and lower extremities  Lymphatics: no cervical or inguinal lymphadenopathy  Skin: no rashes or lesions  Neuro: alert and oriented x 3, no gross deficits  Psych: normal judgment and insight, normal mood/affect and non-anxious  Genitourinary:   patient declined exam    Inc R flank - well healed      Lab Review   Urine analysis today in clinic shows - +nit, trace LE, 5-10 RBCs    Lab Results   Component Value Date    WBC 8.50 08/20/2016    HGB 13.1 08/20/2016    HCT 39.5 08/20/2016    MCV 92 08/20/2016     08/20/2016     Lab Results   Component Value Date    CREATININE 0.9 " 11/11/2019    BUN 15 11/11/2019         Imaging  Images and reports were personally reviewed by me and discussed with patient  VICTORINA reviewed  CT reviewed       Assessment/Plan:      1. Recurrent UTI    - VICTORINA with PVR - R renal mass, mildly elevated PVR 66cc   - Recommended Estrace, she declined this.    - Improving UUI will also likely help improve UTIs     2. OAB (overactive bladder)    - Worsened previously 2/2 UTI - treated   - Will add Myrbetriq to Sanctura   - UCx today     3. Malignant neoplasm of R kidney   - 3.5cm solid renal mass noted on VICTORINA   - CT confirms 4.8cm enhacing RUP mass    - s/p open R partial nephrectomy on 8/17/16   - Pathology: ccRCC, 4.2cm, pT1b, FG 2, neg margins   - CT c/a/p 11/16 - stable 1.6cm area in RUL lung (inflammatory), likely post-op changes in R kidney.    - CT c/a/p 5/17 - R kidney stable, new 2mm nodule in RML lung.    - CT c/a/p 11/17 - R kidney stable, micronodules of R lung. Recheck in 1 year.   - CT c/a/p 11/18 - R kidney scar, stable; micronodules in R lung - stable (likely scarring/benign)   - Annual CT/CXR until 2021 - due 11/20        Follow up in 2 months with PVR, CT scan/CXR in 11/20

## 2020-02-19 LAB
BACTERIA UR CULT: ABNORMAL
BILIRUB SERPL-MCNC: ABNORMAL MG/DL
BLOOD URINE, POC: POSITIVE
COLOR, POC UA: YELLOW
GLUCOSE UR QL STRIP: ABNORMAL
KETONES UR QL STRIP: ABNORMAL
LEUKOCYTE ESTERASE URINE, POC: ABNORMAL
NITRITE, POC UA: POSITIVE
PH, POC UA: 5
POC RESIDUAL URINE VOLUME: 0 ML (ref 0–100)
PROTEIN, POC: ABNORMAL
SPECIFIC GRAVITY, POC UA: 1000
UROBILINOGEN, POC UA: ABNORMAL

## 2020-06-03 ENCOUNTER — TELEPHONE (OUTPATIENT)
Dept: UROLOGY | Facility: CLINIC | Age: 80
End: 2020-06-03

## 2020-06-03 DIAGNOSIS — N32.81 OAB (OVERACTIVE BLADDER): Primary | ICD-10-CM

## 2020-06-03 RX ORDER — TROSPIUM CHLORIDE ER 60 MG/1
60 CAPSULE ORAL DAILY
Qty: 90 CAPSULE | Refills: 3 | Status: SHIPPED | OUTPATIENT
Start: 2020-06-03 | End: 2020-07-08 | Stop reason: SDUPTHER

## 2020-06-03 NOTE — TELEPHONE ENCOUNTER
----- Message from Shameka Connor sent at 6/3/2020  8:34 AM CDT -----  Contact: Self/  [phone]  Type: RX Refill Request    Who Called:   Patient    Refill or New Rx:  Refill    RX Name and Strength:  trospium (SANCTURA XR) 60 mg Cp24 capsule    Preferred Pharmacy with phone number:  [facility]  [phone]    Local or Mail Order:  Local    Ordering Provider:  WILTON Bell    Would the patient rather a call back or a response via [facility]?   Call back    Best Call Back Number:   [phone]    Additional Information:  [facility] does not have medication, patient needs to get it from [email or web address]

## 2020-06-03 NOTE — TELEPHONE ENCOUNTER
Spoke to pt she states [facility] trospium(sanctura xr) 60mg she is asking can a 90 day suppy please be sent to [facility] pharmacy it is cheaper for her to do a 90 day supply. Please advise-noel

## 2020-06-17 ENCOUNTER — OFFICE VISIT (OUTPATIENT)
Dept: UROLOGY | Facility: CLINIC | Age: 80
End: 2020-06-17
Payer: MEDICARE

## 2020-06-17 VITALS
DIASTOLIC BLOOD PRESSURE: 70 MMHG | HEIGHT: 67 IN | BODY MASS INDEX: 27.31 KG/M2 | SYSTOLIC BLOOD PRESSURE: 120 MMHG | WEIGHT: 174 LBS

## 2020-06-17 DIAGNOSIS — N39.0 RECURRENT UTI: ICD-10-CM

## 2020-06-17 DIAGNOSIS — R33.9 INCOMPLETE BLADDER EMPTYING: ICD-10-CM

## 2020-06-17 DIAGNOSIS — N32.81 OAB (OVERACTIVE BLADDER): Primary | ICD-10-CM

## 2020-06-17 DIAGNOSIS — C64.1 MALIGNANT NEOPLASM OF RIGHT KIDNEY: ICD-10-CM

## 2020-06-17 LAB
BILIRUB SERPL-MCNC: ABNORMAL MG/DL
BLOOD URINE, POC: ABNORMAL
COLOR, POC UA: YELLOW
GLUCOSE UR QL STRIP: ABNORMAL
KETONES UR QL STRIP: ABNORMAL
LEUKOCYTE ESTERASE URINE, POC: ABNORMAL
NITRITE, POC UA: POSITIVE
PH, POC UA: ABNORMAL
PROTEIN, POC: ABNORMAL
SPECIFIC GRAVITY, POC UA: 1005
UROBILINOGEN, POC UA: ABNORMAL

## 2020-06-17 PROCEDURE — 99214 OFFICE O/P EST MOD 30 MIN: CPT | Mod: PBBFAC | Performed by: NURSE PRACTITIONER

## 2020-06-17 PROCEDURE — 81001 URINALYSIS AUTO W/SCOPE: CPT | Mod: PBBFAC | Performed by: NURSE PRACTITIONER

## 2020-06-17 PROCEDURE — 99999 PR PBB SHADOW E&M-EST. PATIENT-LVL IV: CPT | Mod: PBBFAC,,, | Performed by: NURSE PRACTITIONER

## 2020-06-17 PROCEDURE — 99999 PR PBB SHADOW E&M-EST. PATIENT-LVL IV: ICD-10-PCS | Mod: PBBFAC,,, | Performed by: NURSE PRACTITIONER

## 2020-06-17 PROCEDURE — 51798 US URINE CAPACITY MEASURE: CPT | Mod: PBBFAC | Performed by: NURSE PRACTITIONER

## 2020-06-17 PROCEDURE — 99214 PR OFFICE/OUTPT VISIT, EST, LEVL IV, 30-39 MIN: ICD-10-PCS | Mod: S$PBB,,, | Performed by: NURSE PRACTITIONER

## 2020-06-17 PROCEDURE — 87077 CULTURE AEROBIC IDENTIFY: CPT

## 2020-06-17 PROCEDURE — 99214 OFFICE O/P EST MOD 30 MIN: CPT | Mod: S$PBB,,, | Performed by: NURSE PRACTITIONER

## 2020-06-17 PROCEDURE — 87088 URINE BACTERIA CULTURE: CPT

## 2020-06-17 PROCEDURE — 87186 SC STD MICRODIL/AGAR DIL: CPT

## 2020-06-17 PROCEDURE — 87086 URINE CULTURE/COLONY COUNT: CPT

## 2020-06-17 NOTE — PROGRESS NOTES
Subjective:       Patient ID: Lola Lerner is a 79 y.o. female who is an established patient of Dr Bell though new to me was last seen in this office 2/17/2020    Chief Complaint:   Chief Complaint   Patient presents with    Follow-up     Pt. states sge feels good and has no complaints ,.... states no other issues      Per Dr Bell:    Recurrent UTIs  She reports recurrent UTIs. No recent UCx in Epic to review. She reports this happens about 3 times a year. She has classic UTI symptoms - dysuria, frequency. Denies fevers. Denies constipation. No h/o stones.      She also has issues with OAB. Reports urgency and frequency. Nocturia x 2. UUI common usually due to waiting to void. Denies SAMSON. She is on Vesicare 10mg now for about 3 years. This medication helps a lot.     She was treated for UTI after last visit in 4/16 (>100k E coli).    Returns with c/o worsening OAB - more frequency and urgency, no UUI. Nocturia x 4. Still taking Vesicare 10mg. UA concerning for UTI. Denies dysuria. Symptoms worsened x 2 weeks - she was treated for UTI at that time.     More issues with UI and UTIs. Now on Sanctura (was on Vesicare) - seems to be working slightly better. Denies UTI symptoms.   PVR (bladder scan) today - 0cc      RCC  VICTORINA was done prior to last visit for UTI workup and noted a 3.5cm solid R renal mass. PVR 66cc. No family history of  malignancy. No gross hematuria.      CT scan shows an enhancing 4.6cm R UP renal mass. CXR showed abnormal area in R lung. CT chest showed likely inflammatory scarring, doubt metastasis.    She is now s/p R open partial nephrectomy on 8/17/16. She did well after surgery.    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   CT c/a/p 5/17 - post-op changes R kidney, RUL lung area resolved, now with 2mm nodule in RML lung. CT c/a/p 11/17 - stable R kidney,  "micronodules in R lung  CT c/a/p 11/18 - R renal scar, stable micronodules in lung  CT a/p 11/19 - R renal scar, no mets/recurrence, stable micronodules in lung, CXR - wnl    6/17/20  Myrbetriq added to Sanctura 60 mg by Dr Bell at previous visit. Reports "upset stomach" with Myrbetriq so she stopped this medication. Now only taking Sanctura. Reports symptoms are better with this medication.  She is here today for a PVR check. She has been having some mild intermittent dysuria. Denies worsening of urinary frequency or urgency. She is not straining to void. Denies gross hematuria, suprapubic pain/pressure, flank pain or fever. UA concerning for UTI today    PVR (bladder scan) today - 107 ml      ACTIVE MEDICAL ISSUES:  Patient Active Problem List   Diagnosis    Hypertension    Hyperlipidemia    Recurrent UTI    OAB (overactive bladder)    Malignant neoplasm of right kidney       ALLERGIES AND MEDICATIONS: updated and reviewed.  Review of patient's allergies indicates:  No Known Allergies  Current Outpatient Medications   Medication Sig    aspirin (ECOTRIN) 81 MG EC tablet Take 81 mg by mouth every evening. 1 Tablet, Delayed Release (E.C.) Oral Every day    calcium carbonate-vitamin D3 600 mg(1,500mg) -100 unit Cap Take by mouth. 1 Capsule Oral Twice a day    donepezil (ARICEPT) 10 MG tablet     levothyroxine (SYNTHROID) 25 MCG tablet     losartan-hydrochlorothiazide 100-25 mg (HYZAAR) 100-25 mg per tablet Take 1 tablet by mouth every morning. 1 Tablet Oral Every day    metoprolol succinate (TOPROL-XL) 25 MG 24 hr tablet     mirabegron (MYRBETRIQ) 50 mg Tb24 Take 1 tablet (50 mg total) by mouth once daily.    multivitamin (ONE DAILY MULTIVITAMIN) per tablet Take 1 tablet by mouth every morning.     rosuvastatin (CRESTOR) 10 MG tablet Take 10 mg by mouth every evening. 1 Tablet Oral Every day    trospium (SANCTURA XR) 60 mg Cp24 capsule Take 1 capsule (60 mg total) by mouth once daily.     No current " "facility-administered medications for this visit.        Review of Systems   Constitutional: Negative for activity change, chills, fatigue, fever and unexpected weight change.   Eyes: Negative for discharge, redness and visual disturbance.   Respiratory: Negative for cough, shortness of breath and wheezing.    Cardiovascular: Negative for chest pain and leg swelling.   Gastrointestinal: Negative for abdominal distention, abdominal pain, constipation, diarrhea, nausea and vomiting.   Genitourinary: Positive for dysuria. Negative for decreased urine volume, difficulty urinating, flank pain, frequency, hematuria, pelvic pain and urgency.   Musculoskeletal: Negative for arthralgias, joint swelling and myalgias.   Skin: Negative for color change and rash.   Neurological: Negative for dizziness and light-headedness.   Psychiatric/Behavioral: Negative for behavioral problems and confusion. The patient is not nervous/anxious.        Objective:      Vitals:    06/17/20 1346   BP: 120/70   Weight: 78.9 kg (174 lb)   Height: 5' 7" (1.702 m)     Physical Exam   Constitutional: She is oriented to person, place, and time. She appears well-developed.   HENT:   Head: Normocephalic and atraumatic.   Nose: Nose normal.   Eyes: Conjunctivae are normal. Right eye exhibits no discharge. Left eye exhibits no discharge.   Neck: Normal range of motion. Neck supple. No tracheal deviation present. No thyromegaly present.   Cardiovascular: Normal rate and regular rhythm.    Pulmonary/Chest: Effort normal. No respiratory distress. She has no wheezes.   Abdominal: Soft. She exhibits no distension. There is no abdominal tenderness. No hernia.   Genitourinary:    Genitourinary Comments: Patient declined exam     Musculoskeletal: Normal range of motion.   Neurological: She is alert and oriented to person, place, and time.   Skin: Skin is warm and dry. No rash noted. No erythema.     Psychiatric: Her behavior is normal. Judgment normal.       Urine " dipstick shows ++LE, +Nitrite.      Assessment:       1. OAB (overactive bladder)    2. Malignant neoplasm of right kidney    3. Recurrent UTI    4. Incomplete bladder emptying          Plan:       1. OAB (overactive bladder)  -Myrbetriq--self stopped 2/2 GI side effects  -Sanctura 60 mg--doing well  - POCT urinalysis, dipstick or tablet reag    2. Malignant neoplasm of right kidney   - 3.5cm solid renal mass noted on VICTORINA   - CT confirms 4.8cm enhacing RUP mass    - s/p open R partial nephrectomy on 8/17/16   - Pathology: ccRCC, 4.2cm, pT1b, FG 2, neg margins   - CT c/a/p 11/16 - stable 1.6cm area in RUL lung (inflammatory), likely post-op changes in R kidney.    - CT c/a/p 5/17 - R kidney stable, new 2mm nodule in RML lung.    - CT c/a/p 11/17 - R kidney stable, micronodules of R lung. Recheck in 1 year.   - CT c/a/p 11/18 - R kidney scar, stable; micronodules in R lung - stable (likely scarring/benign)   - Annual CT/CXR until 2021 - due 11/20    3. Recurrent UTI  -Recommended Estrace cream--previously declined  - POCT urinalysis, dipstick or tablet reag  - POCT Bladder Scan  - Urine culture today    4. Incomplete bladder emptying  -PVR mildly elevated today  -Patient instructed to double void. Cautioned regarding UR  -Monitor  - POCT Bladder Scan  - Urine culture              Follow up in about 2 months (around 8/17/2020) for Follow up PVR.

## 2020-06-19 ENCOUNTER — TELEPHONE (OUTPATIENT)
Dept: UROLOGY | Facility: CLINIC | Age: 80
End: 2020-06-19

## 2020-06-19 LAB — BACTERIA UR CULT: ABNORMAL

## 2020-06-19 RX ORDER — CEPHALEXIN 500 MG/1
500 CAPSULE ORAL 2 TIMES DAILY
Qty: 14 CAPSULE | Refills: 0 | Status: SHIPPED | OUTPATIENT
Start: 2020-06-19 | End: 2020-06-26

## 2020-06-19 NOTE — TELEPHONE ENCOUNTER
Informed pt that recent urine culture was positive for UTI and the antibiotics Keflex were sent to the pharmacy. Pt understood -Priscila

## 2020-06-19 NOTE — TELEPHONE ENCOUNTER
Recent urine culture was positive for a UTI. Appropriate antibiotics (Keflex) were sent to the pharmacy

## 2020-07-08 DIAGNOSIS — N32.81 OAB (OVERACTIVE BLADDER): ICD-10-CM

## 2020-07-08 RX ORDER — TROSPIUM CHLORIDE ER 60 MG/1
60 CAPSULE ORAL DAILY
Qty: 90 CAPSULE | Refills: 3 | Status: SHIPPED | OUTPATIENT
Start: 2020-07-08 | End: 2020-08-17 | Stop reason: SDUPTHER

## 2020-07-15 ENCOUNTER — TELEPHONE (OUTPATIENT)
Dept: UROLOGY | Facility: CLINIC | Age: 80
End: 2020-07-15

## 2020-07-15 NOTE — TELEPHONE ENCOUNTER
----- Message from Ryan Santos sent at 7/15/2020 11:37 AM CDT -----   Name of Who is Calling:     What is the request in detail: patient request call back in reference to medication  questions /concerns Please contact to further discuss and advise      Can the clinic reply by [facility]: no     What Number [facility]:  [phone]

## 2020-08-17 ENCOUNTER — OFFICE VISIT (OUTPATIENT)
Dept: UROLOGY | Facility: CLINIC | Age: 80
End: 2020-08-17
Payer: MEDICARE

## 2020-08-17 VITALS
BODY MASS INDEX: 27.31 KG/M2 | DIASTOLIC BLOOD PRESSURE: 70 MMHG | SYSTOLIC BLOOD PRESSURE: 116 MMHG | HEIGHT: 67 IN | WEIGHT: 174 LBS

## 2020-08-17 DIAGNOSIS — N32.81 OAB (OVERACTIVE BLADDER): Primary | ICD-10-CM

## 2020-08-17 DIAGNOSIS — R33.9 INCOMPLETE BLADDER EMPTYING: ICD-10-CM

## 2020-08-17 DIAGNOSIS — N39.0 RECURRENT UTI: ICD-10-CM

## 2020-08-17 DIAGNOSIS — C64.1 MALIGNANT NEOPLASM OF RIGHT KIDNEY: ICD-10-CM

## 2020-08-17 LAB
BILIRUB SERPL-MCNC: ABNORMAL MG/DL
BLOOD URINE, POC: 50
COLOR, POC UA: YELLOW
GLUCOSE UR QL STRIP: ABNORMAL
KETONES UR QL STRIP: ABNORMAL
LEUKOCYTE ESTERASE URINE, POC: ABNORMAL
NITRITE, POC UA: POSITIVE
PH, POC UA: 6
PROTEIN, POC: ABNORMAL
SPECIFIC GRAVITY, POC UA: 1010
UROBILINOGEN, POC UA: ABNORMAL

## 2020-08-17 PROCEDURE — 81001 URINALYSIS AUTO W/SCOPE: CPT | Mod: PBBFAC | Performed by: NURSE PRACTITIONER

## 2020-08-17 PROCEDURE — 99999 PR PBB SHADOW E&M-EST. PATIENT-LVL IV: ICD-10-PCS | Mod: PBBFAC,,, | Performed by: NURSE PRACTITIONER

## 2020-08-17 PROCEDURE — 99214 OFFICE O/P EST MOD 30 MIN: CPT | Mod: S$PBB,,, | Performed by: NURSE PRACTITIONER

## 2020-08-17 PROCEDURE — 99214 PR OFFICE/OUTPT VISIT, EST, LEVL IV, 30-39 MIN: ICD-10-PCS | Mod: S$PBB,,, | Performed by: NURSE PRACTITIONER

## 2020-08-17 PROCEDURE — 51798 US URINE CAPACITY MEASURE: CPT | Mod: PBBFAC | Performed by: NURSE PRACTITIONER

## 2020-08-17 PROCEDURE — 99214 OFFICE O/P EST MOD 30 MIN: CPT | Mod: PBBFAC | Performed by: NURSE PRACTITIONER

## 2020-08-17 PROCEDURE — 99999 PR PBB SHADOW E&M-EST. PATIENT-LVL IV: CPT | Mod: PBBFAC,,, | Performed by: NURSE PRACTITIONER

## 2020-08-17 RX ORDER — TROSPIUM CHLORIDE ER 60 MG/1
60 CAPSULE ORAL DAILY
Qty: 90 CAPSULE | Refills: 3 | Status: SHIPPED | OUTPATIENT
Start: 2020-08-17 | End: 2020-11-19

## 2020-08-17 NOTE — PROGRESS NOTES
Subjective:       Patient ID: Lola Lerner is a 80 y.o. female who was last seen in this office 6/17/2020    Chief Complaint:   Chief Complaint   Patient presents with    Follow-up     Pt. states no new issues .. states she is fine and has been fine      Per Dr Bell:    Recurrent UTIs  She reports recurrent UTIs. No recent UCx in Epic to review. She reports this happens about 3 times a year. She has classic UTI symptoms - dysuria, frequency. Denies fevers. Denies constipation. No h/o stones.      She also has issues with OAB. Reports urgency and frequency. Nocturia x 2. UUI common usually due to waiting to void. Denies SAMSON. She is on Vesicare 10mg now for about 3 years. This medication helps a lot.     She was treated for UTI after last visit in 4/16 (>100k E coli).    Returns with c/o worsening OAB - more frequency and urgency, no UUI. Nocturia x 4. Still taking Vesicare 10mg. UA concerning for UTI. Denies dysuria. Symptoms worsened x 2 weeks - she was treated for UTI at that time.     More issues with UI and UTIs. Now on Sanctura (was on Vesicare) - seems to be working slightly better. Denies UTI symptoms.   PVR (bladder scan) today - 0cc      RCC  VICTORINA was done prior to last visit for UTI workup and noted a 3.5cm solid R renal mass. PVR 66cc. No family history of  malignancy. No gross hematuria.      CT scan shows an enhancing 4.6cm R UP renal mass. CXR showed abnormal area in R lung. CT chest showed likely inflammatory scarring, doubt metastasis.    She is now s/p R open partial nephrectomy on 8/17/16. She did well after surgery.    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   CT c/a/p 5/17 - post-op changes R kidney, RUL lung area resolved, now with 2mm nodule in RML lung. CT c/a/p 11/17 - stable R kidney, micronodules in R lung  CT c/a/p 11/18 - R renal scar, stable micronodules in  "lung  CT a/p 11/19 - R renal scar, no mets/recurrence, stable micronodules in lung, CXR - wnl    8/17/20  Previously stopped Myrbetriq due to "upset stomach". Now only taking Sanctura. Reports symptoms are better with this medication. She was treated with Keflex for UTI ( E coli) by myself at previous visit. PVR also noted to be mildly elevated at last visit.     She is here today for a PVR check. Doing well with Sanctura. She denies UTI symptoms currently. Overall she feels well. No new complaints      PVR (bladder scan) last visit - 107 ml.  Today-- 12 ml      ACTIVE MEDICAL ISSUES:  Patient Active Problem List   Diagnosis    Hypertension    Hyperlipidemia    Recurrent UTI    OAB (overactive bladder)    Malignant neoplasm of right kidney       ALLERGIES AND MEDICATIONS: updated and reviewed.  Review of patient's allergies indicates:  No Known Allergies  Current Outpatient Medications   Medication Sig    aspirin (ECOTRIN) 81 MG EC tablet Take 81 mg by mouth every evening. 1 Tablet, Delayed Release (E.C.) Oral Every day    calcium carbonate-vitamin D3 600 mg(1,500mg) -100 unit Cap Take by mouth. 1 Capsule Oral Twice a day    donepezil (ARICEPT) 10 MG tablet     levothyroxine (SYNTHROID) 25 MCG tablet     losartan-hydrochlorothiazide 100-25 mg (HYZAAR) 100-25 mg per tablet Take 1 tablet by mouth every morning. 1 Tablet Oral Every day    metoprolol succinate (TOPROL-XL) 25 MG 24 hr tablet     multivitamin (ONE DAILY MULTIVITAMIN) per tablet Take 1 tablet by mouth every morning.     rosuvastatin (CRESTOR) 10 MG tablet Take 10 mg by mouth every evening. 1 Tablet Oral Every day    trospium (SANCTURA XR) 60 mg Cp24 capsule Take 1 capsule (60 mg total) by mouth once daily.    mirabegron (MYRBETRIQ) 50 mg Tb24 Take 1 tablet (50 mg total) by mouth once daily.     No current facility-administered medications for this visit.        Review of Systems   Constitutional: Negative for activity change, chills, " "fatigue, fever and unexpected weight change.   Eyes: Negative for discharge, redness and visual disturbance.   Respiratory: Negative for cough, shortness of breath and wheezing.    Cardiovascular: Negative for chest pain and leg swelling.   Gastrointestinal: Negative for abdominal distention, abdominal pain, constipation, diarrhea, nausea and vomiting.   Genitourinary: Negative for decreased urine volume, difficulty urinating, dysuria, flank pain, frequency, hematuria, pelvic pain and urgency.   Musculoskeletal: Negative for arthralgias, joint swelling and myalgias.   Skin: Negative for color change and rash.   Neurological: Negative for dizziness and light-headedness.   Psychiatric/Behavioral: Negative for behavioral problems and confusion. The patient is not nervous/anxious.        Objective:      Vitals:    08/17/20 1400   BP: 116/70   Weight: 78.9 kg (174 lb)   Height: 5' 7" (1.702 m)     Physical Exam   Constitutional: She is oriented to person, place, and time. She appears well-developed.   HENT:   Head: Normocephalic and atraumatic.   Nose: Nose normal.   Eyes: Conjunctivae are normal. Right eye exhibits no discharge. Left eye exhibits no discharge.   Neck: Normal range of motion. Neck supple. No tracheal deviation present. No thyromegaly present.   Cardiovascular: Normal rate and regular rhythm.    Pulmonary/Chest: Effort normal. No respiratory distress. She has no wheezes.   Abdominal: Soft. She exhibits no distension. There is no abdominal tenderness. No hernia.   Genitourinary:    Genitourinary Comments: Patient declined exam     Musculoskeletal: Normal range of motion.   Neurological: She is alert and oriented to person, place, and time.   Skin: Skin is warm and dry. No rash noted. No erythema.     Psychiatric: Her behavior is normal. Judgment normal.       Urine dipstick shows ++LE, +Nitrite, 50 RBCs.    Assessment:       1. OAB (overactive bladder)    2. Incomplete bladder emptying    3. Recurrent UTI  "   4. Malignant neoplasm of right kidney          Plan:       1. OAB (overactive bladder)  -Myrbetriq--self stopped 2/2 GI side effects  -Sanctura 60 mg--doing well. Rx sent to mail order pharmacy per patient request  - POCT urinalysis, dipstick or tablet reag  - trospium (SANCTURA XR) 60 mg Cp24 capsule; Take 1 capsule (60 mg total) by mouth once daily.  Dispense: 90 capsule; Refill: 3    2. Incomplete bladder emptying  -Improved  -PVR today--acceptable  - POCT urinalysis, dipstick or tablet reag  - POCT Bladder Scan    3. Recurrent UTI  -Recommended Estrace cream--previously declined  -UA concerning for UTI, patient without symptoms  -Ok to hold on treatment of asymptomatic bacteriuria   - POCT urinalysis, dipstick or tablet reag    4. Malignant neoplasm of right kidney   - 3.5cm solid renal mass noted on VICTORINA   - CT confirms 4.8cm enhacing RUP mass    - s/p open R partial nephrectomy on 8/17/16   - Pathology: ccRCC, 4.2cm, pT1b, FG 2, neg margins   - CT c/a/p 11/16 - stable 1.6cm area in RUL lung (inflammatory), likely post-op changes in R kidney.    - CT c/a/p 5/17 - R kidney stable, new 2mm nodule in RML lung.    - CT c/a/p 11/17 - R kidney stable, micronodules of R lung. Recheck in 1 year.   - CT c/a/p 11/18 - R kidney scar, stable; micronodules in R lung - stable (likely scarring/benign)   - Annual CT/CXR until 2021 - due 11/20  - CT Abdomen Pelvis W Wo Contrast; Future  - Basic metabolic panel; Future  - X-Ray Chest PA And Lateral; Future            Follow up in about 3 months (around 11/17/2020) for Follow up.

## 2020-09-18 ENCOUNTER — OFFICE VISIT (OUTPATIENT)
Dept: UROLOGY | Facility: CLINIC | Age: 80
End: 2020-09-18
Payer: MEDICARE

## 2020-09-18 VITALS — WEIGHT: 174 LBS | BODY MASS INDEX: 27.31 KG/M2 | HEIGHT: 67 IN

## 2020-09-18 DIAGNOSIS — N32.81 OAB (OVERACTIVE BLADDER): ICD-10-CM

## 2020-09-18 DIAGNOSIS — R33.9 INCOMPLETE BLADDER EMPTYING: ICD-10-CM

## 2020-09-18 DIAGNOSIS — C64.1 MALIGNANT NEOPLASM OF RIGHT KIDNEY: ICD-10-CM

## 2020-09-18 DIAGNOSIS — N39.0 RECURRENT UTI: ICD-10-CM

## 2020-09-18 DIAGNOSIS — R35.0 URINARY FREQUENCY: Primary | ICD-10-CM

## 2020-09-18 PROCEDURE — 99213 OFFICE O/P EST LOW 20 MIN: CPT | Mod: PBBFAC,25 | Performed by: NURSE PRACTITIONER

## 2020-09-18 PROCEDURE — 87088 URINE BACTERIA CULTURE: CPT

## 2020-09-18 PROCEDURE — 99999 PR PBB SHADOW E&M-EST. PATIENT-LVL III: CPT | Mod: PBBFAC,,, | Performed by: NURSE PRACTITIONER

## 2020-09-18 PROCEDURE — 99214 PR OFFICE/OUTPT VISIT, EST, LEVL IV, 30-39 MIN: ICD-10-PCS | Mod: S$PBB,,, | Performed by: NURSE PRACTITIONER

## 2020-09-18 PROCEDURE — 99999 PR PBB SHADOW E&M-EST. PATIENT-LVL III: ICD-10-PCS | Mod: PBBFAC,,, | Performed by: NURSE PRACTITIONER

## 2020-09-18 PROCEDURE — 51798 US URINE CAPACITY MEASURE: CPT | Mod: PBBFAC | Performed by: NURSE PRACTITIONER

## 2020-09-18 PROCEDURE — 81001 URINALYSIS AUTO W/SCOPE: CPT | Mod: PBBFAC | Performed by: NURSE PRACTITIONER

## 2020-09-18 PROCEDURE — 99214 OFFICE O/P EST MOD 30 MIN: CPT | Mod: S$PBB,,, | Performed by: NURSE PRACTITIONER

## 2020-09-18 PROCEDURE — 87086 URINE CULTURE/COLONY COUNT: CPT

## 2020-09-18 PROCEDURE — 87186 SC STD MICRODIL/AGAR DIL: CPT

## 2020-09-18 PROCEDURE — 87077 CULTURE AEROBIC IDENTIFY: CPT

## 2020-09-18 RX ORDER — CEPHALEXIN 500 MG/1
500 CAPSULE ORAL 2 TIMES DAILY
Qty: 14 CAPSULE | Refills: 0 | Status: SHIPPED | OUTPATIENT
Start: 2020-09-18 | End: 2020-09-18 | Stop reason: CLARIF

## 2020-09-18 RX ORDER — CEPHALEXIN 500 MG/1
500 CAPSULE ORAL 2 TIMES DAILY
Qty: 14 CAPSULE | Refills: 0 | Status: SHIPPED | OUTPATIENT
Start: 2020-09-18 | End: 2020-09-25

## 2020-09-18 NOTE — PROGRESS NOTES
Subjective:       Patient ID: Lola Lerner is a 80 y.o. female who was last seen in this office 8/17/2020    Chief Complaint:   Chief Complaint   Patient presents with    Urinary Frequency     pt states that she is urinating frequently, every 2 hours       Per Dr Bell:    Recurrent UTIs  She reports recurrent UTIs. No recent UCx in Epic to review. She reports this happens about 3 times a year. She has classic UTI symptoms - dysuria, frequency. Denies fevers. Denies constipation. No h/o stones.      She also has issues with OAB. Reports urgency and frequency. Nocturia x 2. UUI common usually due to waiting to void. Denies SAMSON. She is on Vesicare 10mg now for about 3 years. This medication helps a lot.     She was treated for UTI after last visit in 4/16 (>100k E coli).    Returns with c/o worsening OAB - more frequency and urgency, no UUI. Nocturia x 4. Still taking Vesicare 10mg. UA concerning for UTI. Denies dysuria. Symptoms worsened x 2 weeks - she was treated for UTI at that time.     More issues with UI and UTIs. Now on Sanctura (was on Vesicare) - seems to be working slightly better. Denies UTI symptoms.   PVR (bladder scan) today - 0cc      RCC  VICTORINA was done prior to last visit for UTI workup and noted a 3.5cm solid R renal mass. PVR 66cc. No family history of  malignancy. No gross hematuria.      CT scan shows an enhancing 4.6cm R UP renal mass. CXR showed abnormal area in R lung. CT chest showed likely inflammatory scarring, doubt metastasis.    She is now s/p R open partial nephrectomy on 8/17/16. She did well after surgery.    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   CT c/a/p 5/17 - post-op changes R kidney, RUL lung area resolved, now with 2mm nodule in RML lung. CT c/a/p 11/17 - stable R kidney, micronodules in R lung  CT c/a/p 11/18 - R renal scar, stable micronodules  "in lung  CT a/p 11/19 - R renal scar, no mets/recurrence, stable micronodules in lung, CXR - wnl    8/17/20  Previously stopped Myrbetriq due to "upset stomach". Now only taking Sanctura. Reports symptoms are better with this medication. She was treated with Keflex for UTI ( E coli) by myself at previous visit. PVR also noted to be mildly elevated at last visit.     She is here today for a PVR check. Doing well with Sanctura. She denies UTI symptoms currently. Overall she feels well. No new complaints      PVR (bladder scan) last visit - 107 ml.  Today-- 12 ml    9/18/20  She presents today with c/o urinary frequency and intermittent dysuria for the past 2 days. Reports voiding q 2 hrs. She is not straining to void. Denies SAMSON, suprapubic pressure/pain, gross hematuria, flank pain or fever    PVR (bladder scan) today - 101 ml      ACTIVE MEDICAL ISSUES:  Patient Active Problem List   Diagnosis    Hypertension    Hyperlipidemia    Recurrent UTI    OAB (overactive bladder)    Malignant neoplasm of right kidney       ALLERGIES AND MEDICATIONS: updated and reviewed.  Review of patient's allergies indicates:  No Known Allergies  Current Outpatient Medications   Medication Sig    aspirin (ECOTRIN) 81 MG EC tablet Take 81 mg by mouth every evening. 1 Tablet, Delayed Release (E.C.) Oral Every day    calcium carbonate-vitamin D3 600 mg(1,500mg) -100 unit Cap Take by mouth. 1 Capsule Oral Twice a day    donepezil (ARICEPT) 10 MG tablet     levothyroxine (SYNTHROID) 25 MCG tablet     losartan-hydrochlorothiazide 100-25 mg (HYZAAR) 100-25 mg per tablet Take 1 tablet by mouth every morning. 1 Tablet Oral Every day    metoprolol succinate (TOPROL-XL) 25 MG 24 hr tablet     multivitamin (ONE DAILY MULTIVITAMIN) per tablet Take 1 tablet by mouth every morning.     rosuvastatin (CRESTOR) 10 MG tablet Take 10 mg by mouth every evening. 1 Tablet Oral Every day    trospium (SANCTURA XR) 60 mg Cp24 capsule Take 1 capsule " "(60 mg total) by mouth once daily.    cephALEXin (KEFLEX) 500 MG capsule Take 1 capsule (500 mg total) by mouth 2 (two) times daily. for 7 days    mirabegron (MYRBETRIQ) 50 mg Tb24 Take 1 tablet (50 mg total) by mouth once daily.     No current facility-administered medications for this visit.        Review of Systems   Constitutional: Negative for activity change, chills, fatigue, fever and unexpected weight change.   Eyes: Negative for discharge, redness and visual disturbance.   Respiratory: Negative for cough, shortness of breath and wheezing.    Cardiovascular: Negative for chest pain and leg swelling.   Gastrointestinal: Negative for abdominal distention, abdominal pain, constipation, diarrhea, nausea and vomiting.   Genitourinary: Positive for dysuria and frequency. Negative for decreased urine volume, difficulty urinating, flank pain, hematuria, pelvic pain and urgency.   Musculoskeletal: Negative for arthralgias, joint swelling and myalgias.   Skin: Negative for color change and rash.   Neurological: Negative for dizziness and light-headedness.   Psychiatric/Behavioral: Negative for behavioral problems and confusion. The patient is not nervous/anxious.        Objective:      Vitals:    09/18/20 1517   Weight: 78.9 kg (174 lb)   Height: 5' 7" (1.702 m)     Physical Exam   Constitutional: She is oriented to person, place, and time. She appears well-developed.   HENT:   Head: Normocephalic and atraumatic.   Nose: Nose normal.   Eyes: Conjunctivae are normal. Right eye exhibits no discharge. Left eye exhibits no discharge.   Neck: Normal range of motion. Neck supple. No tracheal deviation present. No thyromegaly present.   Cardiovascular: Normal rate and regular rhythm.    Pulmonary/Chest: Effort normal. No respiratory distress. She has no wheezes.   Abdominal: Soft. She exhibits no distension. There is no abdominal tenderness. No hernia.   Genitourinary:    Genitourinary Comments: Patient declined exam "     Musculoskeletal: Normal range of motion.   Neurological: She is alert and oriented to person, place, and time.   Skin: Skin is warm and dry. No rash noted. No erythema.     Psychiatric: Her behavior is normal. Judgment normal.       Urine dipstick shows ++LE, +protein, 50 RBCs.      Assessment:       1. Urinary frequency    2. OAB (overactive bladder)    3. Incomplete bladder emptying    4. Malignant neoplasm of right kidney    5. Recurrent UTI          Plan:       1. Urinary frequency  -May be d/t UTI vs SAMSON  - POCT urinalysis, dipstick or tablet reag  - POCT Bladder Scan  - Urine culture  - cephALEXin (KEFLEX) 500 MG capsule; Take 1 capsule (500 mg total) by mouth 2 (two) times daily. for 7 days  Dispense: 14 capsule; Refill: 0    2. OAB (overactive bladder)  -Myrbetriq--self stopped 2/2 GI side effects  -Sanctura 60 mg--doing well  -Ok to continue Sanctura cautiously.  UR precautions discussed with patient. She will present to ED if she develops symptoms of UR    3. Incomplete bladder emptying  -Mildly elevated PVR today  -Patient instructed to double void  -UR precautions discussed with patient  - POCT Bladder Scan  - Urine culture  - cephALEXin (KEFLEX) 500 MG capsule; Take 1 capsule (500 mg total) by mouth 2 (two) times daily. for 7 days  Dispense: 14 capsule; Refill: 0    4. Malignant neoplasm of right kidney   - 3.5cm solid renal mass noted on VICTORINA   - CT confirms 4.8cm enhacing RUP mass    - s/p open R partial nephrectomy on 8/17/16   - Pathology: ccRCC, 4.2cm, pT1b, FG 2, neg margins   - CT c/a/p 11/16 - stable 1.6cm area in RUL lung (inflammatory), likely post-op changes in R kidney.    - CT c/a/p 5/17 - R kidney stable, new 2mm nodule in RML lung.    - CT c/a/p 11/17 - R kidney stable, micronodules of R lung. Recheck in 1 year.   - CT c/a/p 11/18 - R kidney scar, stable; micronodules in R lung - stable (likely scarring/benign)   - Annual CT/CXR until 2021 - due 11/20    5. Recurrent UTI  -Recommended  Estrace cream--previously declined  -+UTI symptoms today. Empiric Keflex  - Urine culture  - cephALEXin (KEFLEX) 500 MG capsule; Take 1 capsule (500 mg total) by mouth 2 (two) times daily. for 7 days  Dispense: 14 capsule; Refill: 0          Follow up in about 3 weeks (around 10/9/2020) for Follow up PVR.

## 2020-09-20 LAB — BACTERIA UR CULT: ABNORMAL

## 2020-09-21 ENCOUNTER — TELEPHONE (OUTPATIENT)
Dept: UROLOGY | Facility: CLINIC | Age: 80
End: 2020-09-21

## 2020-09-21 NOTE — TELEPHONE ENCOUNTER
Low growth bacteria noted on recent urine culture. Will treat due to patient having symptoms. Ok to continue antibiotics (Keflex)given at previous visit

## 2020-10-12 ENCOUNTER — OFFICE VISIT (OUTPATIENT)
Dept: UROLOGY | Facility: CLINIC | Age: 80
End: 2020-10-12
Payer: MEDICARE

## 2020-10-12 VITALS
BODY MASS INDEX: 27.31 KG/M2 | DIASTOLIC BLOOD PRESSURE: 80 MMHG | WEIGHT: 174 LBS | SYSTOLIC BLOOD PRESSURE: 124 MMHG | HEIGHT: 67 IN

## 2020-10-12 DIAGNOSIS — N39.0 RECURRENT UTI: Primary | ICD-10-CM

## 2020-10-12 DIAGNOSIS — N32.81 OAB (OVERACTIVE BLADDER): ICD-10-CM

## 2020-10-12 DIAGNOSIS — R33.9 INCOMPLETE BLADDER EMPTYING: ICD-10-CM

## 2020-10-12 DIAGNOSIS — C64.1 MALIGNANT NEOPLASM OF RIGHT KIDNEY: ICD-10-CM

## 2020-10-12 PROCEDURE — 99213 OFFICE O/P EST LOW 20 MIN: CPT | Mod: PBBFAC | Performed by: NURSE PRACTITIONER

## 2020-10-12 PROCEDURE — 99999 PR PBB SHADOW E&M-EST. PATIENT-LVL III: ICD-10-PCS | Mod: PBBFAC,,, | Performed by: NURSE PRACTITIONER

## 2020-10-12 PROCEDURE — 99214 PR OFFICE/OUTPT VISIT, EST, LEVL IV, 30-39 MIN: ICD-10-PCS | Mod: S$PBB,,, | Performed by: NURSE PRACTITIONER

## 2020-10-12 PROCEDURE — 51798 US URINE CAPACITY MEASURE: CPT | Mod: PBBFAC | Performed by: NURSE PRACTITIONER

## 2020-10-12 PROCEDURE — 81001 URINALYSIS AUTO W/SCOPE: CPT | Mod: PBBFAC | Performed by: NURSE PRACTITIONER

## 2020-10-12 PROCEDURE — 99999 PR PBB SHADOW E&M-EST. PATIENT-LVL III: CPT | Mod: PBBFAC,,, | Performed by: NURSE PRACTITIONER

## 2020-10-12 PROCEDURE — 99214 OFFICE O/P EST MOD 30 MIN: CPT | Mod: S$PBB,,, | Performed by: NURSE PRACTITIONER

## 2020-10-12 NOTE — PROGRESS NOTES
Subjective:       Patient ID: Lola Lerner is a 80 y.o. female who was last seen in this office 9/18/2020    Chief Complaint:   Chief Complaint   Patient presents with    Follow-up     Pt. is here for a follow up and has some questions in reference to her medications .. thinks she may have had a reaction or is allergic to one of them (Trospium) she has had some dry mouth and itchy hands      Per Dr Bell:    Recurrent UTIs  She reports recurrent UTIs. No recent UCx in Epic to review. She reports this happens about 3 times a year. She has classic UTI symptoms - dysuria, frequency. Denies fevers. Denies constipation. No h/o stones.      She also has issues with OAB. Reports urgency and frequency. Nocturia x 2. UUI common usually due to waiting to void. Denies SAMSON. She is on Vesicare 10mg now for about 3 years. This medication helps a lot.     She was treated for UTI after last visit in 4/16 (>100k E coli).    Returns with c/o worsening OAB - more frequency and urgency, no UUI. Nocturia x 4. Still taking Vesicare 10mg. UA concerning for UTI. Denies dysuria. Symptoms worsened x 2 weeks - she was treated for UTI at that time.     More issues with UI and UTIs. Now on Sanctura (was on Vesicare) - seems to be working slightly better. Denies UTI symptoms.   PVR (bladder scan) today - 0cc      RCC  VICTORINA was done prior to last visit for UTI workup and noted a 3.5cm solid R renal mass. PVR 66cc. No family history of  malignancy. No gross hematuria.      CT scan shows an enhancing 4.6cm R UP renal mass. CXR showed abnormal area in R lung. CT chest showed likely inflammatory scarring, doubt metastasis.    She is now s/p R open partial nephrectomy on 8/17/16. She did well after surgery.    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   CT c/a/p 5/17 - post-op changes R kidney, RUL lung area resolved, now  "with 2mm nodule in RML lung. CT c/a/p 11/17 - stable R kidney, micronodules in R lung  CT c/a/p 11/18 - R renal scar, stable micronodules in lung  CT a/p 11/19 - R renal scar, no mets/recurrence, stable micronodules in lung, CXR - wnl    8/17/20  Previously stopped Myrbetriq due to "upset stomach". Now only taking Sanctura. Reports symptoms are better with this medication. She was treated with Keflex for UTI ( E coli) by myself at previous visit. PVR also noted to be mildly elevated at last visit.     She is here today for a PVR check. Doing well with Sanctura. She denies UTI symptoms currently. Overall she feels well. No new complaints      PVR (bladder scan) last visit - 107 ml.  Today-- 12 ml    9/18/20  She presents today with c/o urinary frequency and intermittent dysuria for the past 2 days. Reports voiding q 2 hrs. She is not straining to void. Denies SAMSON, suprapubic pressure/pain, gross hematuria, flank pain or fever    PVR (bladder scan) today - 101 ml    10/12/20  Low growth bacteria noted on recent UCx. Given Keflex at last visit due to UTI symptoms. She has completed her abx.  Also with mildly elevated PVR. Denies UTI symptoms today. Now reporting dry mouth with Sanctura--symptoms are mild per her report. No other complaints    UCx 9/18/20--10 to 49k E coli    PVR (bladder scan) today - 0 ml    ACTIVE MEDICAL ISSUES:  Patient Active Problem List   Diagnosis    Hypertension    Hyperlipidemia    Recurrent UTI    OAB (overactive bladder)    Malignant neoplasm of right kidney       ALLERGIES AND MEDICATIONS: updated and reviewed.  Review of patient's allergies indicates:  No Known Allergies  Current Outpatient Medications   Medication Sig    aspirin (ECOTRIN) 81 MG EC tablet Take 81 mg by mouth every evening. 1 Tablet, Delayed Release (E.C.) Oral Every day    calcium carbonate-vitamin D3 600 mg(1,500mg) -100 unit Cap Take by mouth. 1 Capsule Oral Twice a day    donepezil (ARICEPT) 10 MG tablet     " "levothyroxine (SYNTHROID) 25 MCG tablet     losartan-hydrochlorothiazide 100-25 mg (HYZAAR) 100-25 mg per tablet Take 1 tablet by mouth every morning. 1 Tablet Oral Every day    metoprolol succinate (TOPROL-XL) 25 MG 24 hr tablet     multivitamin (ONE DAILY MULTIVITAMIN) per tablet Take 1 tablet by mouth every morning.     rosuvastatin (CRESTOR) 10 MG tablet Take 10 mg by mouth every evening. 1 Tablet Oral Every day    trospium (SANCTURA XR) 60 mg Cp24 capsule Take 1 capsule (60 mg total) by mouth once daily.    mirabegron (MYRBETRIQ) 50 mg Tb24 Take 1 tablet (50 mg total) by mouth once daily.     No current facility-administered medications for this visit.        Review of Systems   Constitutional: Negative for activity change, chills, fatigue, fever and unexpected weight change.   Eyes: Negative for discharge, redness and visual disturbance.   Respiratory: Negative for cough, shortness of breath and wheezing.    Cardiovascular: Negative for chest pain and leg swelling.   Gastrointestinal: Negative for abdominal distention, abdominal pain, constipation, diarrhea, nausea and vomiting.   Genitourinary: Negative for decreased urine volume, difficulty urinating, dysuria, flank pain, frequency, hematuria, pelvic pain, urgency, vaginal bleeding and vaginal pain.   Musculoskeletal: Negative for arthralgias, joint swelling and myalgias.   Skin: Negative for color change and rash.   Neurological: Negative for dizziness and light-headedness.   Psychiatric/Behavioral: Negative for behavioral problems and confusion. The patient is not nervous/anxious.        Objective:      Vitals:    10/12/20 1548   BP: 124/80   Weight: 78.9 kg (174 lb)   Height: 5' 7" (1.702 m)     Physical Exam   Constitutional: She is oriented to person, place, and time. She appears well-developed.   HENT:   Head: Normocephalic and atraumatic.   Nose: Nose normal.   Eyes: Conjunctivae are normal. Right eye exhibits no discharge. Left eye exhibits no " discharge.   Neck: Normal range of motion. Neck supple. No tracheal deviation present. No thyromegaly present.   Cardiovascular: Normal rate and regular rhythm.    Pulmonary/Chest: Effort normal. No respiratory distress. She has no wheezes.   Abdominal: Soft. She exhibits no distension. There is no abdominal tenderness. No hernia.   Genitourinary:    Genitourinary Comments: Patient declined exam     Musculoskeletal: Normal range of motion.   Neurological: She is alert and oriented to person, place, and time.   Skin: Skin is warm and dry. No rash noted. No erythema.     Psychiatric: Her behavior is normal. Judgment normal.       Urine dipstick shows negative for all components.  Micro exam: negative for WBC's or RBC's.    Assessment:       1. Recurrent UTI    2. Incomplete bladder emptying    3. OAB (overactive bladder)    4. Malignant neoplasm of right kidney          Plan:       1. Recurrent UTI  -Recommended Estrace cream--previously declined  -UA today --clear  -No UTI symptoms  - POCT urinalysis, dipstick or tablet reag    2. Incomplete bladder emptying  -PVR--0 ml  - POCT Bladder Scan    3. OAB (overactive bladder)  -Myrbetriq--self stopped 2/2 GI side effects  -Sanctura 60 mg--doing well (now with report of dry mouth)  -Discussed interventions to alleviate dry mouth (hard candy/lozenges, adequate hydration, gum, etc)  -She wishes to continue Sanctura  - POCT urinalysis, dipstick or tablet reag    4. Malignant neoplasm of right kidney   - 3.5cm solid renal mass noted on VICTORINA   - CT confirms 4.8cm enhacing RUP mass    - s/p open R partial nephrectomy on 8/17/16   - Pathology: ccRCC, 4.2cm, pT1b, FG 2, neg margins   - CT c/a/p 11/16 - stable 1.6cm area in RUL lung (inflammatory), likely post-op changes in R kidney.    - CT c/a/p 5/17 - R kidney stable, new 2mm nodule in RML lung.    - CT c/a/p 11/17 - R kidney stable, micronodules of R lung. Recheck in 1 year.   - CT c/a/p 11/18 - R kidney scar, stable;  micronodules in R lung - stable (likely scarring/benign)   - Annual CT/CXR until 2021 - due 11/20        Follow up in about 5 weeks (around 11/19/2020) for Follow up with CT scan/CXR.

## 2020-11-02 ENCOUNTER — HOSPITAL ENCOUNTER (OUTPATIENT)
Dept: RADIOLOGY | Facility: HOSPITAL | Age: 80
Discharge: HOME | End: 2020-11-02
Attending: NURSE PRACTITIONER
Payer: MEDICARE

## 2020-11-02 DIAGNOSIS — C64.1 MALIGNANT NEOPLASM OF RIGHT KIDNEY: ICD-10-CM

## 2020-11-02 PROCEDURE — 71046 X-RAY EXAM CHEST 2 VIEWS: CPT | Mod: 26,,, | Performed by: RADIOLOGY

## 2020-11-02 PROCEDURE — 25500020 PHARM REV CODE 255: Performed by: NURSE PRACTITIONER

## 2020-11-02 PROCEDURE — 71046 XR CHEST PA AND LATERAL: ICD-10-PCS | Mod: 26,,, | Performed by: RADIOLOGY

## 2020-11-02 PROCEDURE — 74178 CT ABD&PLV WO CNTR FLWD CNTR: CPT | Mod: 26,,, | Performed by: RADIOLOGY

## 2020-11-02 PROCEDURE — 74178 CT ABDOMEN PELVIS W WO CONTRAST: ICD-10-PCS | Mod: 26,,, | Performed by: RADIOLOGY

## 2020-11-02 PROCEDURE — 71046 X-RAY EXAM CHEST 2 VIEWS: CPT | Mod: TC,FY

## 2020-11-02 PROCEDURE — 74178 CT ABD&PLV WO CNTR FLWD CNTR: CPT | Mod: TC

## 2020-11-02 RX ADMIN — IOHEXOL 75 ML: 350 INJECTION, SOLUTION INTRAVENOUS at 11:11

## 2020-11-19 ENCOUNTER — OFFICE VISIT (OUTPATIENT)
Dept: UROLOGY | Facility: CLINIC | Age: 80
End: 2020-11-19
Payer: MEDICARE

## 2020-11-19 VITALS — BODY MASS INDEX: 27.06 KG/M2 | HEIGHT: 67 IN | WEIGHT: 172.38 LBS

## 2020-11-19 DIAGNOSIS — N39.0 RECURRENT UTI: Primary | ICD-10-CM

## 2020-11-19 DIAGNOSIS — C64.1 MALIGNANT NEOPLASM OF RIGHT KIDNEY: ICD-10-CM

## 2020-11-19 DIAGNOSIS — N32.81 OAB (OVERACTIVE BLADDER): ICD-10-CM

## 2020-11-19 DIAGNOSIS — R35.0 FREQUENCY OF URINATION: ICD-10-CM

## 2020-11-19 LAB
BILIRUB SERPL-MCNC: ABNORMAL MG/DL
BLOOD URINE, POC: ABNORMAL
CLARITY, POC UA: ABNORMAL
COLOR, POC UA: YELLOW
GLUCOSE UR QL STRIP: ABNORMAL
KETONES UR QL STRIP: ABNORMAL
LEUKOCYTE ESTERASE URINE, POC: ABNORMAL
NITRITE, POC UA: ABNORMAL
PH, POC UA: 5
PROTEIN, POC: ABNORMAL
SPECIFIC GRAVITY, POC UA: 1015
UROBILINOGEN, POC UA: ABNORMAL

## 2020-11-19 PROCEDURE — 87086 URINE CULTURE/COLONY COUNT: CPT

## 2020-11-19 PROCEDURE — 87186 SC STD MICRODIL/AGAR DIL: CPT

## 2020-11-19 PROCEDURE — 81002 URINALYSIS NONAUTO W/O SCOPE: CPT | Mod: PBBFAC | Performed by: UROLOGY

## 2020-11-19 PROCEDURE — 99999 PR PBB SHADOW E&M-EST. PATIENT-LVL III: ICD-10-PCS | Mod: PBBFAC,,, | Performed by: UROLOGY

## 2020-11-19 PROCEDURE — 99213 OFFICE O/P EST LOW 20 MIN: CPT | Mod: PBBFAC | Performed by: UROLOGY

## 2020-11-19 PROCEDURE — 99214 PR OFFICE/OUTPT VISIT, EST, LEVL IV, 30-39 MIN: ICD-10-PCS | Mod: S$PBB,,, | Performed by: UROLOGY

## 2020-11-19 PROCEDURE — 99999 PR PBB SHADOW E&M-EST. PATIENT-LVL III: CPT | Mod: PBBFAC,,, | Performed by: UROLOGY

## 2020-11-19 PROCEDURE — 87077 CULTURE AEROBIC IDENTIFY: CPT

## 2020-11-19 PROCEDURE — 99214 OFFICE O/P EST MOD 30 MIN: CPT | Mod: S$PBB,,, | Performed by: UROLOGY

## 2020-11-19 PROCEDURE — 87088 URINE BACTERIA CULTURE: CPT

## 2020-11-19 NOTE — PROGRESS NOTES
Subjective:       Lola Lerner is a 80 y.o. female who is an established patient who was self-referred for evaluation of OAB/UTIs and RCC.      Recurrent UTIs  She reports recurrent UTIs. No recent UCx in Epic to review. She reports this happens about 3 times a year. She has classic UTI symptoms - dysuria, frequency. Denies fevers. Denies constipation. No h/o stones.      She also has issues with OAB. Reports urgency and frequency. Nocturia x 2. UUI common usually due to waiting to void. Denies SAMSON. She is on Vesicare 10mg now for about 3 years. This medication helps a lot.     She was treated for UTI after last visit in 4/16 (>100k E coli).    Returns with c/o worsening OAB - more frequency and urgency, no UUI. Nocturia x 4. Still taking Vesicare 10mg. UA concerning for UTI. Denies dysuria. Symptoms worsened x 2 weeks - she was treated for UTI at that time.   PVR (bladder scan)last visit - 0cc    More issues with UI and UTIs. Now on Sanctura (was on Vesicare) - seemed to be working slightly better. Sanctura now causing more severe dry mouth. Stopped Myrbetriq.         RCC  VICTORINA was done prior to last visit for UTI workup and noted a 3.5cm solid R renal mass. PVR 66cc. No family history of  malignancy. No gross hematuria.      CT scan shows an enhancing 4.6cm R UP renal mass. CXR showed abnormal area in R lung. CT chest showed likely inflammatory scarring, doubt metastasis.    She is now s/p R open partial nephrectomy on 8/17/16. She did well after surgery.    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   CT c/a/p 5/17 - post-op changes R kidney, RUL lung area resolved, now with 2mm nodule in RML lung. CT c/a/p 11/17 - stable R kidney, micronodules in R lung  CT c/a/p 11/18 - R renal scar, stable micronodules in lung  CT a/p 11/19 - R renal scar, no mets/recurrence, stable micronodules in  "lung, CXR - wnl  CT a/p 11/20 - post-op R kidney, no mets/recurrence, simple L renal cysts. Stable micronodule in RLL lung. CXR - wnl     The following portions of the patient's history were reviewed and updated as appropriate: allergies, current medications, past family history, past medical history, past social history, past surgical history and problem list.    Review of Systems  Constitutional: no fever or chills  ENT: no nasal congestion or sore throat  Respiratory: no cough or shortness of breath  Cardiovascular: no chest pain or palpitations  Gastrointestinal: no nausea or vomiting, tolerating diet  Genitourinary: as per HPI  Hematologic/Lymphatic: no easy bruising or lymphadenopathy  Musculoskeletal: no arthralgias or myalgias  Skin: no rashes or lesions  Neurological: no seizures or tremors  Behavioral/Psych: no auditory or visual hallucinations       Objective:    Vitals:   Ht 5' 7" (1.702 m)   Wt 78.2 kg (172 lb 6.4 oz)   BMI 27.00 kg/m²     Physical Exam   General: well developed, well nourished in no acute distress  Head: normocephalic, atraumatic  Neck: supple, trachea midline, no obvious enlargement of thyroid  HEENT: EOMI, mucus membranes moist, sclera anicteric, no hearing impairment  Lungs: symmetric expansion, non-labored breathing  Cardiovascular: regular rate and rhythm, normal pulses  Abdomen: soft, non tender, non distended, no palpable masses, no hepatosplenomegaly, no hernias, no CVA tenderness  Musculoskeletal: no peripheral edema, normal ROM in bilateral upper and lower extremities  Lymphatics: no cervical or inguinal lymphadenopathy  Skin: no rashes or lesions  Neuro: alert and oriented x 3, no gross deficits  Psych: normal judgment and insight, normal mood/affect and non-anxious  Genitourinary:   patient declined exam    Inc R flank - well healed      Lab Review   Urine analysis today in clinic shows - +nit, trace LE, 5-10 RBCs    Lab Results   Component Value Date    WBC 8.50 " 08/20/2016    HGB 13.1 08/20/2016    HCT 39.5 08/20/2016    MCV 92 08/20/2016     08/20/2016     Lab Results   Component Value Date    CREATININE 0.8 11/02/2020    BUN 19 11/02/2020         Imaging  Images and reports were personally reviewed by me and discussed with patient  VICTORINA reviewed  CT reviewed       Assessment/Plan:      1. Recurrent UTI    - VICTORINA with PVR - R renal mass, mildly elevated PVR 66cc   - Recommended Estrace, she declined this.    - Improving UUI will also likely help improve UTIs     2. OAB (overactive bladder)    - Worsened previously 2/2 UTI - treated   - Myrbetriq - stopped 2/2 SE (dizziness)   - Sanctura - seems to help but severe dry mouth   - Trial Oxytrol patch (OTC)     3. Malignant neoplasm of R kidney   - 3.5cm solid renal mass noted on VICTORINA   - CT confirms 4.8cm enhacing RUP mass    - s/p open R partial nephrectomy on 8/17/16   - Pathology: ccRCC, 4.2cm, pT1b, FG 2, neg margins   - Surveillance CTs have been stable/SAW   - Annual CT/CXR until 2021 - due 11/21        Follow up in 2 months with PVR. CT scan/CXR in 11/21

## 2020-11-19 NOTE — PATIENT INSTRUCTIONS
Oxytrol patch (over the counter) - able to purchase at local pharmacy or online without a prescription.

## 2020-11-21 LAB — BACTERIA UR CULT: ABNORMAL

## 2020-11-23 ENCOUNTER — TELEPHONE (OUTPATIENT)
Dept: UROLOGY | Facility: CLINIC | Age: 80
End: 2020-11-23

## 2020-11-23 RX ORDER — SULFAMETHOXAZOLE AND TRIMETHOPRIM 800; 160 MG/1; MG/1
1 TABLET ORAL 2 TIMES DAILY
Qty: 14 TABLET | Refills: 0 | Status: SHIPPED | OUTPATIENT
Start: 2020-11-23 | End: 2020-11-30

## 2020-11-23 NOTE — TELEPHONE ENCOUNTER
Informed pt that recent urine cultured was positive for a UTI and that the appropriate antibiotics, Bactrim, were sent to the [facility] on [address]. Pt understood -CLIVE

## 2021-01-21 ENCOUNTER — OFFICE VISIT (OUTPATIENT)
Dept: UROLOGY | Facility: CLINIC | Age: 81
End: 2021-01-21
Payer: MEDICARE

## 2021-01-21 VITALS — HEIGHT: 67 IN | BODY MASS INDEX: 26.84 KG/M2 | WEIGHT: 171 LBS

## 2021-01-21 DIAGNOSIS — N32.81 OAB (OVERACTIVE BLADDER): Primary | ICD-10-CM

## 2021-01-21 DIAGNOSIS — N39.0 RECURRENT UTI: ICD-10-CM

## 2021-01-21 DIAGNOSIS — C64.1 MALIGNANT NEOPLASM OF RIGHT KIDNEY: ICD-10-CM

## 2021-01-21 PROCEDURE — 51798 US URINE CAPACITY MEASURE: CPT | Mod: PBBFAC | Performed by: UROLOGY

## 2021-01-21 PROCEDURE — 99214 OFFICE O/P EST MOD 30 MIN: CPT | Mod: S$PBB,,, | Performed by: UROLOGY

## 2021-01-21 PROCEDURE — 99214 PR OFFICE/OUTPT VISIT, EST, LEVL IV, 30-39 MIN: ICD-10-PCS | Mod: S$PBB,,, | Performed by: UROLOGY

## 2021-01-21 PROCEDURE — 81002 URINALYSIS NONAUTO W/O SCOPE: CPT | Mod: PBBFAC | Performed by: UROLOGY

## 2021-01-21 PROCEDURE — 99213 OFFICE O/P EST LOW 20 MIN: CPT | Mod: PBBFAC,25 | Performed by: UROLOGY

## 2021-01-21 PROCEDURE — 99999 PR PBB SHADOW E&M-EST. PATIENT-LVL III: CPT | Mod: PBBFAC,,, | Performed by: UROLOGY

## 2021-01-21 PROCEDURE — 99999 PR PBB SHADOW E&M-EST. PATIENT-LVL III: ICD-10-PCS | Mod: PBBFAC,,, | Performed by: UROLOGY

## 2021-01-22 LAB
BILIRUB SERPL-MCNC: NORMAL MG/DL
BLOOD URINE, POC: NORMAL
CLARITY, POC UA: NORMAL
COLOR, POC UA: YELLOW
GLUCOSE UR QL STRIP: NORMAL
KETONES UR QL STRIP: NORMAL
LEUKOCYTE ESTERASE URINE, POC: NORMAL
NITRITE, POC UA: NORMAL
PH, POC UA: 5
POC RESIDUAL URINE VOLUME: 89 ML (ref 0–100)
PROTEIN, POC: NORMAL
SPECIFIC GRAVITY, POC UA: 1010
UROBILINOGEN, POC UA: NORMAL

## 2021-01-26 ENCOUNTER — PATIENT MESSAGE (OUTPATIENT)
Dept: PHARMACY | Facility: CLINIC | Age: 81
End: 2021-01-26

## 2021-01-26 ENCOUNTER — IMMUNIZATION (OUTPATIENT)
Dept: PHARMACY | Facility: CLINIC | Age: 81
End: 2021-01-26

## 2021-01-26 DIAGNOSIS — Z23 NEED FOR VACCINATION: Primary | ICD-10-CM

## 2021-02-23 ENCOUNTER — IMMUNIZATION (OUTPATIENT)
Dept: PHARMACY | Facility: CLINIC | Age: 81
End: 2021-02-23

## 2021-02-23 DIAGNOSIS — Z23 NEED FOR VACCINATION: Primary | ICD-10-CM

## 2021-03-29 ENCOUNTER — OFFICE VISIT (OUTPATIENT)
Dept: UROLOGY | Facility: CLINIC | Age: 81
End: 2021-03-29
Payer: MEDICARE

## 2021-03-29 VITALS — BODY MASS INDEX: 26.06 KG/M2 | WEIGHT: 171.94 LBS | HEIGHT: 68 IN

## 2021-03-29 DIAGNOSIS — N32.81 OAB (OVERACTIVE BLADDER): ICD-10-CM

## 2021-03-29 DIAGNOSIS — C64.1 MALIGNANT NEOPLASM OF RIGHT KIDNEY: ICD-10-CM

## 2021-03-29 DIAGNOSIS — N39.0 RECURRENT UTI: Primary | ICD-10-CM

## 2021-03-29 PROCEDURE — 99213 OFFICE O/P EST LOW 20 MIN: CPT | Mod: PBBFAC | Performed by: UROLOGY

## 2021-03-29 PROCEDURE — 87077 CULTURE AEROBIC IDENTIFY: CPT | Performed by: UROLOGY

## 2021-03-29 PROCEDURE — 87086 URINE CULTURE/COLONY COUNT: CPT | Performed by: UROLOGY

## 2021-03-29 PROCEDURE — 99213 PR OFFICE/OUTPT VISIT, EST, LEVL III, 20-29 MIN: ICD-10-PCS | Mod: S$PBB,,, | Performed by: UROLOGY

## 2021-03-29 PROCEDURE — 87186 SC STD MICRODIL/AGAR DIL: CPT | Performed by: UROLOGY

## 2021-03-29 PROCEDURE — 99999 PR PBB SHADOW E&M-EST. PATIENT-LVL III: ICD-10-PCS | Mod: PBBFAC,,, | Performed by: UROLOGY

## 2021-03-29 PROCEDURE — 99213 OFFICE O/P EST LOW 20 MIN: CPT | Mod: S$PBB,,, | Performed by: UROLOGY

## 2021-03-29 PROCEDURE — 99999 PR PBB SHADOW E&M-EST. PATIENT-LVL III: CPT | Mod: PBBFAC,,, | Performed by: UROLOGY

## 2021-03-29 PROCEDURE — 87088 URINE BACTERIA CULTURE: CPT | Performed by: UROLOGY

## 2021-03-31 ENCOUNTER — TELEPHONE (OUTPATIENT)
Dept: UROLOGY | Facility: CLINIC | Age: 81
End: 2021-03-31

## 2021-03-31 LAB — BACTERIA UR CULT: ABNORMAL

## 2021-03-31 RX ORDER — CEPHALEXIN 500 MG/1
500 CAPSULE ORAL EVERY 8 HOURS
Qty: 30 CAPSULE | Refills: 0 | Status: SHIPPED | OUTPATIENT
Start: 2021-03-31 | End: 2021-04-10

## 2021-05-05 ENCOUNTER — OFFICE VISIT (OUTPATIENT)
Dept: OTOLARYNGOLOGY | Facility: CLINIC | Age: 81
End: 2021-05-05
Payer: MEDICARE

## 2021-05-05 VITALS
SYSTOLIC BLOOD PRESSURE: 126 MMHG | TEMPERATURE: 98 F | HEIGHT: 68 IN | WEIGHT: 166.56 LBS | DIASTOLIC BLOOD PRESSURE: 82 MMHG | BODY MASS INDEX: 25.24 KG/M2

## 2021-05-05 DIAGNOSIS — J34.3 HYPERTROPHY OF INFERIOR NASAL TURBINATE: ICD-10-CM

## 2021-05-05 DIAGNOSIS — J30.0 VASOMOTOR RHINITIS: Primary | ICD-10-CM

## 2021-05-05 DIAGNOSIS — J34.2 DEVIATED NASAL SEPTUM: ICD-10-CM

## 2021-05-05 PROCEDURE — 31231 PR NASAL ENDOSCOPY, DX: ICD-10-PCS | Mod: S$GLB,,, | Performed by: OTOLARYNGOLOGY

## 2021-05-05 PROCEDURE — 99203 PR OFFICE/OUTPT VISIT, NEW, LEVL III, 30-44 MIN: ICD-10-PCS | Mod: 25,S$GLB,, | Performed by: OTOLARYNGOLOGY

## 2021-05-05 PROCEDURE — 99203 OFFICE O/P NEW LOW 30 MIN: CPT | Mod: 25,S$GLB,, | Performed by: OTOLARYNGOLOGY

## 2021-05-05 PROCEDURE — 31231 NASAL ENDOSCOPY DX: CPT | Mod: S$GLB,,, | Performed by: OTOLARYNGOLOGY

## 2021-05-05 RX ORDER — TROSPIUM CHLORIDE ER 60 MG/1
CAPSULE ORAL
COMMUNITY
Start: 2021-01-26 | End: 2021-05-05

## 2021-05-05 RX ORDER — PNEUMOCOCCAL 13-VALENT CONJUGATE VACCINE 2.2; 2.2; 2.2; 2.2; 2.2; 4.4; 2.2; 2.2; 2.2; 2.2; 2.2; 2.2; 2.2 UG/.5ML; UG/.5ML; UG/.5ML; UG/.5ML; UG/.5ML; UG/.5ML; UG/.5ML; UG/.5ML; UG/.5ML; UG/.5ML; UG/.5ML; UG/.5ML; UG/.5ML
INJECTION, SUSPENSION INTRAMUSCULAR
COMMUNITY
Start: 2020-09-29 | End: 2021-11-29

## 2021-05-05 RX ORDER — IBUPROFEN 200 MG
1 CAPSULE ORAL
COMMUNITY
End: 2022-09-01 | Stop reason: SDUPTHER

## 2021-05-05 RX ORDER — LEVOTHYROXINE SODIUM 25 UG/1
TABLET ORAL
COMMUNITY
Start: 2020-12-14

## 2021-05-05 RX ORDER — IPRATROPIUM BROMIDE 42 UG/1
2 SPRAY, METERED NASAL 3 TIMES DAILY PRN
Qty: 15 ML | Refills: 3 | Status: SHIPPED | OUTPATIENT
Start: 2021-05-05 | End: 2021-08-27

## 2021-05-05 RX ORDER — SULFAMETHOXAZOLE AND TRIMETHOPRIM 800; 160 MG/1; MG/1
TABLET ORAL
COMMUNITY
Start: 2020-11-23 | End: 2021-06-29

## 2021-05-05 RX ORDER — TROSPIUM CHLORIDE ER 60 MG/1
CAPSULE ORAL
COMMUNITY
Start: 2020-06-03 | End: 2021-06-29

## 2021-05-05 RX ORDER — CEPHALEXIN 500 MG/1
CAPSULE ORAL
COMMUNITY
Start: 2020-06-19 | End: 2021-06-29

## 2021-06-23 ENCOUNTER — OFFICE VISIT (OUTPATIENT)
Dept: OTOLARYNGOLOGY | Facility: CLINIC | Age: 81
End: 2021-06-23
Payer: MEDICARE

## 2021-06-23 VITALS
HEIGHT: 68 IN | DIASTOLIC BLOOD PRESSURE: 78 MMHG | TEMPERATURE: 98 F | SYSTOLIC BLOOD PRESSURE: 130 MMHG | BODY MASS INDEX: 25.39 KG/M2 | WEIGHT: 167.56 LBS

## 2021-06-23 DIAGNOSIS — J34.2 DEVIATED NASAL SEPTUM: ICD-10-CM

## 2021-06-23 DIAGNOSIS — J34.3 HYPERTROPHY OF INFERIOR NASAL TURBINATE: ICD-10-CM

## 2021-06-23 DIAGNOSIS — J30.0 VASOMOTOR RHINITIS: Primary | ICD-10-CM

## 2021-06-23 PROCEDURE — 31231 PR NASAL ENDOSCOPY, DX: ICD-10-PCS | Mod: S$GLB,,, | Performed by: OTOLARYNGOLOGY

## 2021-06-23 PROCEDURE — 99213 OFFICE O/P EST LOW 20 MIN: CPT | Mod: 25,S$GLB,, | Performed by: OTOLARYNGOLOGY

## 2021-06-23 PROCEDURE — 31231 NASAL ENDOSCOPY DX: CPT | Mod: S$GLB,,, | Performed by: OTOLARYNGOLOGY

## 2021-06-23 PROCEDURE — 99213 PR OFFICE/OUTPT VISIT, EST, LEVL III, 20-29 MIN: ICD-10-PCS | Mod: 25,S$GLB,, | Performed by: OTOLARYNGOLOGY

## 2021-06-29 ENCOUNTER — OFFICE VISIT (OUTPATIENT)
Dept: UROLOGY | Facility: CLINIC | Age: 81
End: 2021-06-29
Payer: MEDICARE

## 2021-06-29 VITALS — HEIGHT: 67 IN | BODY MASS INDEX: 26.26 KG/M2 | WEIGHT: 167.31 LBS

## 2021-06-29 DIAGNOSIS — C64.1 MALIGNANT NEOPLASM OF RIGHT KIDNEY: ICD-10-CM

## 2021-06-29 DIAGNOSIS — N32.81 OAB (OVERACTIVE BLADDER): ICD-10-CM

## 2021-06-29 DIAGNOSIS — N39.0 RECURRENT UTI: Primary | ICD-10-CM

## 2021-06-29 PROCEDURE — 99213 OFFICE O/P EST LOW 20 MIN: CPT | Mod: PBBFAC | Performed by: UROLOGY

## 2021-06-29 PROCEDURE — 99214 OFFICE O/P EST MOD 30 MIN: CPT | Mod: S$PBB,,, | Performed by: UROLOGY

## 2021-06-29 PROCEDURE — 99214 PR OFFICE/OUTPT VISIT, EST, LEVL IV, 30-39 MIN: ICD-10-PCS | Mod: S$PBB,,, | Performed by: UROLOGY

## 2021-06-29 PROCEDURE — 99999 PR PBB SHADOW E&M-EST. PATIENT-LVL III: CPT | Mod: PBBFAC,,, | Performed by: UROLOGY

## 2021-06-29 PROCEDURE — 99999 PR PBB SHADOW E&M-EST. PATIENT-LVL III: ICD-10-PCS | Mod: PBBFAC,,, | Performed by: UROLOGY

## 2021-10-18 RX ORDER — IPRATROPIUM BROMIDE 42 UG/1
SPRAY, METERED NASAL
Qty: 15 ML | Refills: 3 | Status: SHIPPED | OUTPATIENT
Start: 2021-10-18

## 2021-10-18 NOTE — TELEPHONE ENCOUNTER
----- Message from Vivian Leigh sent at 10/18/2021  1:55 PM CDT -----  Regarding: Medication  Refill  Request        RX Name and Strength:        ipratropium (ATROVENT) 42 mcg (0.06 %) nasal spray    How is the patient currently taking it : ipratropium (ATROVENT) 42 mcg (0.06 %) nasal spray    30 day supply     Preferred Pharmacy with phone number:    [id number] [place], [place] - [address] AT [facility] [address]     Phone: [phone]  Fax:  [phone]    Local Order: Yes     Ordering Provider:  Sheri James MD    Would the patient rather a call back or a response via [facility]?  No     Best Call Back Number: [phone] (R)     Additional Info:  None

## 2021-10-29 ENCOUNTER — IMMUNIZATION (OUTPATIENT)
Dept: PHARMACY | Facility: CLINIC | Age: 81
End: 2021-10-29
Payer: MEDICARE

## 2021-10-29 DIAGNOSIS — Z23 NEED FOR VACCINATION: Primary | ICD-10-CM

## 2021-11-23 ENCOUNTER — HOSPITAL ENCOUNTER (OUTPATIENT)
Dept: RADIOLOGY | Facility: HOSPITAL | Age: 81
Discharge: HOME | End: 2021-11-23
Attending: UROLOGY
Payer: MEDICARE

## 2021-11-23 DIAGNOSIS — C64.1 MALIGNANT NEOPLASM OF RIGHT KIDNEY: ICD-10-CM

## 2021-11-23 PROCEDURE — 74178 CT ABD&PLV WO CNTR FLWD CNTR: CPT | Mod: 26,,, | Performed by: RADIOLOGY

## 2021-11-23 PROCEDURE — 71046 X-RAY EXAM CHEST 2 VIEWS: CPT | Mod: 26,,, | Performed by: INTERNAL MEDICINE

## 2021-11-23 PROCEDURE — 25500020 PHARM REV CODE 255: Performed by: UROLOGY

## 2021-11-23 PROCEDURE — 71046 XR CHEST PA AND LATERAL: ICD-10-PCS | Mod: 26,,, | Performed by: INTERNAL MEDICINE

## 2021-11-23 PROCEDURE — 71046 X-RAY EXAM CHEST 2 VIEWS: CPT | Mod: TC,FY

## 2021-11-23 PROCEDURE — 74178 CT ABDOMEN PELVIS W WO CONTRAST: ICD-10-PCS | Mod: 26,,, | Performed by: RADIOLOGY

## 2021-11-23 PROCEDURE — 74178 CT ABD&PLV WO CNTR FLWD CNTR: CPT | Mod: TC

## 2021-11-23 RX ADMIN — IOHEXOL 75 ML: 350 INJECTION, SOLUTION INTRAVENOUS at 10:11

## 2021-11-29 ENCOUNTER — OFFICE VISIT (OUTPATIENT)
Dept: UROLOGY | Facility: CLINIC | Age: 81
End: 2021-11-29
Payer: MEDICARE

## 2021-11-29 VITALS — BODY MASS INDEX: 26.21 KG/M2 | WEIGHT: 167 LBS | HEIGHT: 67 IN

## 2021-11-29 DIAGNOSIS — N39.0 RECURRENT UTI: ICD-10-CM

## 2021-11-29 DIAGNOSIS — C64.1 MALIGNANT NEOPLASM OF RIGHT KIDNEY: Primary | ICD-10-CM

## 2021-11-29 DIAGNOSIS — N32.81 OAB (OVERACTIVE BLADDER): ICD-10-CM

## 2021-11-29 PROCEDURE — 99214 OFFICE O/P EST MOD 30 MIN: CPT | Mod: S$PBB,,, | Performed by: UROLOGY

## 2021-11-29 PROCEDURE — 99213 OFFICE O/P EST LOW 20 MIN: CPT | Mod: PBBFAC | Performed by: UROLOGY

## 2021-11-29 PROCEDURE — 99999 PR PBB SHADOW E&M-EST. PATIENT-LVL III: CPT | Mod: PBBFAC,,, | Performed by: UROLOGY

## 2021-11-29 PROCEDURE — 99999 PR PBB SHADOW E&M-EST. PATIENT-LVL III: ICD-10-PCS | Mod: PBBFAC,,, | Performed by: UROLOGY

## 2021-11-29 PROCEDURE — 99214 PR OFFICE/OUTPT VISIT, EST, LEVL IV, 30-39 MIN: ICD-10-PCS | Mod: S$PBB,,, | Performed by: UROLOGY

## 2022-01-10 ENCOUNTER — OFFICE VISIT (OUTPATIENT)
Dept: UROLOGY | Facility: CLINIC | Age: 82
End: 2022-01-10
Payer: MEDICARE

## 2022-01-10 VITALS — HEIGHT: 67 IN | BODY MASS INDEX: 27.94 KG/M2 | WEIGHT: 178 LBS

## 2022-01-10 DIAGNOSIS — N39.0 RECURRENT UTI: Primary | ICD-10-CM

## 2022-01-10 DIAGNOSIS — N32.81 OAB (OVERACTIVE BLADDER): ICD-10-CM

## 2022-01-10 DIAGNOSIS — C64.1 MALIGNANT NEOPLASM OF RIGHT KIDNEY: ICD-10-CM

## 2022-01-10 PROCEDURE — 99999 PR PBB SHADOW E&M-EST. PATIENT-LVL III: ICD-10-PCS | Mod: PBBFAC,,, | Performed by: UROLOGY

## 2022-01-10 PROCEDURE — 99999 PR PBB SHADOW E&M-EST. PATIENT-LVL III: CPT | Mod: PBBFAC,,, | Performed by: UROLOGY

## 2022-01-10 PROCEDURE — 99213 OFFICE O/P EST LOW 20 MIN: CPT | Mod: PBBFAC | Performed by: UROLOGY

## 2022-01-10 PROCEDURE — 99214 PR OFFICE/OUTPT VISIT, EST, LEVL IV, 30-39 MIN: ICD-10-PCS | Mod: S$PBB,,, | Performed by: UROLOGY

## 2022-01-10 PROCEDURE — 99214 OFFICE O/P EST MOD 30 MIN: CPT | Mod: S$PBB,,, | Performed by: UROLOGY

## 2022-01-10 NOTE — PROGRESS NOTES
Subjective:       Lola Lerner is a 81 y.o. female who is an established patient who was self-referred for evaluation of OAB/UTIs and RCC.      Recurrent UTIs  She reports recurrent UTIs. No recent UCx in Epic to review. She reports this happens about 3 times a year. She has classic UTI symptoms - dysuria, frequency. Denies fevers. Denies constipation. No h/o stones.      She also has issues with OAB. Reports urgency and frequency. Nocturia x 2. UUI common usually due to waiting to void. Denies SAMSON. She is on Vesicare 10mg now for about 3 years. This medication helps a lot.     She was treated for UTI after last visit in 4/16 (>100k E coli).    Returns with c/o worsening OAB - more frequency and urgency, no UUI. Nocturia x 4. Still taking Vesicare 10mg. UA concerning for UTI. Denies dysuria. Symptoms worsened x 2 weeks - she was treated for UTI at that time.   PVR (bladder scan)last visit - 0cc    More issues with UI and UTIs. Now on Sanctura (was on Vesicare) - seemed to be working slightly better. Sanctura now causing more severe dry mouth. Stopped Myrbetriq.     1/21/2021  Using Oxytrol patch - helped mildly with frequency. Mild dry mouth   PVR (bladder scan) today - 89cc    3/29/2021   Still with frequency, suddenly worsened 4 days ago. +nit urine today. Denies dysuria.   PVR (bladder scan) today - 56cc    6/29/2021  Treated for UTI last visit. Known issue of rUTIs. Still with some frequency/urgency. Oxytrol patch working well though some difficulty finding it. Denies dysuria.   PVR (bladder scan) today - 107cc    11/29/2021  Still with OAB issues while using Oxytrol. CT/CXR clear.     1/10/2022  Restarted Myrbetriq 25mg - some improvement of OAB.       RCC  VICTORINA was done prior to last visit for UTI workup and noted a 3.5cm solid R renal mass. PVR 66cc. No family history of  malignancy. No gross hematuria.      CT scan shows an enhancing 4.6cm R UP renal mass. CXR showed abnormal area in R lung. CT  "chest showed likely inflammatory scarring, doubt metastasis.    She is now s/p R open partial nephrectomy on 8/17/16. She did well after surgery.    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   CT c/a/p 5/17 - post-op changes R kidney, RUL lung area resolved, now with 2mm nodule in RML lung. CT c/a/p 11/17 - stable R kidney, micronodules in R lung  CT c/a/p 11/18 - R renal scar, stable micronodules in lung  CT a/p 11/19 - R renal scar, no mets/recurrence, stable micronodules in lung, CXR - wnl  CT a/p 11/20 - post-op R kidney, no mets/recurrence, simple L renal cysts. Stable micronodule in RLL lung. CXR - wnl  CT a/p 11/21 - no mets/recurrence. CXR - wnl     The following portions of the patient's history were reviewed and updated as appropriate: allergies, current medications, past family history, past medical history, past social history, past surgical history and problem list.    Review of Systems  Constitutional: no fever or chills  ENT: no nasal congestion or sore throat  Respiratory: no cough or shortness of breath  Cardiovascular: no chest pain or palpitations  Gastrointestinal: no nausea or vomiting, tolerating diet  Genitourinary: as per HPI  Hematologic/Lymphatic: no easy bruising or lymphadenopathy  Musculoskeletal: no arthralgias or myalgias  Skin: no rashes or lesions  Neurological: no seizures or tremors  Behavioral/Psych: no auditory or visual hallucinations       Objective:    Vitals:   Ht 5' 7" (1.702 m)   Wt 80.7 kg (178 lb)   BMI 27.88 kg/m²     Physical Exam   General: well developed, well nourished in no acute distress  Head: normocephalic, atraumatic  Neck: supple, trachea midline, no obvious enlargement of thyroid  HEENT: EOMI, mucus membranes moist, sclera anicteric, no hearing impairment  Lungs: symmetric expansion, non-labored breathing  Cardiovascular: regular rate and " rhythm, normal pulses  Skin: no rashes or lesions  Neuro: alert and oriented x 3, no gross deficits  Psych: normal judgment and insight, normal mood/affect and non-anxious  Genitourinary:   patient declined exam    Inc R flank - well healed      Lab Review    Urine analysis today in clinic shows -+nit, LE (denies dysuria)    Lab Results   Component Value Date    WBC 8.50 08/20/2016    HGB 13.1 08/20/2016    HCT 39.5 08/20/2016    MCV 92 08/20/2016     08/20/2016     Lab Results   Component Value Date    CREATININE 0.8 11/23/2021    BUN 19 11/02/2020         Imaging  Images and reports were personally reviewed by me and discussed with patient  VICTORINA reviewed, CT reviewed       Assessment/Plan:      1. Recurrent UTI    - VICTORINA with PVR - R renal mass, mildly elevated PVR 66cc   - Recommended Estrace, she declined this.    - Improving UUI will also likely help improve UTIs     2. OAB (overactive bladder)    - Worsened previously 2/2 UTI - treated   - Myrbetriq - stopped 2/2 SE (dizziness)   - Sanctura - seems to help but severe dry mouth   - Oxytrol patch (OTC) - some improvement, using though some difficulty obtaining   - Myrbetriq 25mg - discussed increasing to 50mg, will hold for now.      3. 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 Okay to stop screening - consider VICTORINA q2y.        Follow up in 6 months

## 2022-04-22 ENCOUNTER — TELEPHONE (OUTPATIENT)
Dept: UROLOGY | Facility: CLINIC | Age: 82
End: 2022-04-22
Payer: MEDICARE

## 2022-04-22 NOTE — TELEPHONE ENCOUNTER
----- Message from Lila Ritchie sent at 4/22/2022  2:37 PM CDT -----  Type: Patient Call Back    Who called: Self    What is the request in detail: Pt has questions regarding current medication    Can the clinic reply by [facility]? no    Would the patient rather a call back or a response via [facility]? Call back    Best call back number:[phone]

## 2022-06-19 NOTE — PROGRESS NOTES
Subjective:       Lola Lerner is a 81 y.o. female who is an established patient who was self-referred for evaluation of OAB/UTIs and RCC.      Recurrent UTIs  She reports recurrent UTIs. No recent UCx in Epic to review. She reports this happens about 3 times a year. She has classic UTI symptoms - dysuria, frequency. Denies fevers. Denies constipation. No h/o stones.      She also has issues with OAB. Reports urgency and frequency. Nocturia x 2. UUI common usually due to waiting to void. Denies SAMSON. She is on Vesicare 10mg now for about 3 years. This medication helps a lot.     She was treated for UTI after last visit in  (>100k E coli).    Returns with c/o worsening OAB - more frequency and urgency, no UUI. Nocturia x 4. Still taking Vesicare 10mg. UA concerning for UTI. Denies dysuria. Symptoms worsened x 2 weeks - she was treated for UTI at that time.   PVR (bladder scan)last visit - 0cc    More issues with UI and UTIs. Now on Sanctura (was on Vesicare) - seemed to be working slightly better. Sanctura now causing more severe dry mouth. Stopped Myrbetriq.     2021  Using Oxytrol patch - helped mildly with frequency. Mild dry mouth   PVR (bladder scan) today - 89cc    3/29/2021   Still with frequency, suddenly worsened 4 days ago. +nit urine today. Denies dysuria.   PVR (bladder scan) today - 56cc    2021  Treated for UTI last visit. Known issue of rUTIs. Still with some frequency/urgency. Oxytrol patch working well though some difficulty finding it. Denies dysuria.   PVR (bladder scan) today - 107cc    2021  Still with OAB issues while using Oxytrol. CT/CXR clear.     1/10/2022  Restarted Myrbetriq 25mg - some improvement of OAB.     2022  She returns now with c/o difficulty walking and stiffness in R leg about 1 week ago. Used walker, now improved. Denies LUTS - no dysuria or other UTI symptoms. She does not currently have PCP (recently ).         RCC  VICTORINA was done prior  "to last visit for UTI workup and noted a 3.5cm solid R renal mass. PVR 66cc. No family history of  malignancy. No gross hematuria.      CT scan shows an enhancing 4.6cm R UP renal mass. CXR showed abnormal area in R lung. CT chest showed likely inflammatory scarring, doubt metastasis.    She is now s/p R open partial nephrectomy on 8/17/16. She did well after surgery.    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   CT c/a/p 5/17 - post-op changes R kidney, RUL lung area resolved, now with 2mm nodule in RML lung. CT c/a/p 11/17 - stable R kidney, micronodules in R lung  CT c/a/p 11/18 - R renal scar, stable micronodules in lung  CT a/p 11/19 - R renal scar, no mets/recurrence, stable micronodules in lung, CXR - wnl  CT a/p 11/20 - post-op R kidney, no mets/recurrence, simple L renal cysts. Stable micronodule in RLL lung. CXR - wnl  CT a/p 11/21 - no mets/recurrence. CXR - wnl         The following portions of the patient's history were reviewed and updated as appropriate: allergies, current medications, past family history, past medical history, past social history, past surgical history and problem list.    Review of Systems  Constitutional: no fever or chills  ENT: no nasal congestion or sore throat  Respiratory: no cough or shortness of breath  Cardiovascular: no chest pain or palpitations  Gastrointestinal: no nausea or vomiting, tolerating diet  Genitourinary: as per HPI  Hematologic/Lymphatic: no easy bruising or lymphadenopathy  Musculoskeletal: no arthralgias or myalgias  Skin: no rashes or lesions  Neurological: no seizures or tremors  Behavioral/Psych: no auditory or visual hallucinations       Objective:    Vitals:   Ht 5' 7" (1.702 m)   Wt 76.3 kg (168 lb 3.4 oz)   BMI 26.35 kg/m²     Physical Exam   General: well developed, well nourished in no acute distress  Head: normocephalic, " atraumatic  Neck: supple, trachea midline, no obvious enlargement of thyroid  HEENT: EOMI, mucus membranes moist, sclera anicteric, no hearing impairment  Lungs: symmetric expansion, non-labored breathing  Cardiovascular: regular rate and rhythm, normal pulses  Skin: no rashes or lesions  Neuro: alert and oriented x 3, no gross deficits  Psych: normal judgment and insight, normal mood/affect and non-anxious  Genitourinary:   patient declined exam    Inc R flank - well healed      Lab Review    Urine analysis today in clinic shows -+nit, LE (denies dysuria)    Lab Results   Component Value Date    WBC 8.50 08/20/2016    HGB 13.1 08/20/2016    HCT 39.5 08/20/2016    MCV 92 08/20/2016     08/20/2016     Lab Results   Component Value Date    CREATININE 0.8 11/23/2021    BUN 19 11/02/2020         Imaging  Images and reports were personally reviewed by me and discussed with patient  VICTORINA reviewed, CT reviewed       Assessment/Plan:      1. Recurrent UTI    - VICTORINA with PVR - R renal mass, mildly elevated PVR 66cc   - Recommended Estrace, she declined this.    - Improving UUI will also likely help improve UTIs     2. OAB (overactive bladder)    - Worsened previously 2/2 UTI - treated   - Myrbetriq - stopped 2/2 SE (dizziness)   - Sanctura - seems to help but severe dry mouth   - Oxytrol patch (OTC) - some improvement, using though some difficulty obtaining   - Myrbetriq 25mg - discussed increasing to 50mg, will hold for now.      3. 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 Okay to stop screening - consider VICTORINA q2y (11/23).    4. Leg pain   - Do not suspect  related    - Improved   - No urinary symptoms    - Recent imaging of kidney (11/21) normal      Encouraged to establish with  new PCP as her PCP has recently passed away.         Follow up in 6 months

## 2022-06-20 ENCOUNTER — OFFICE VISIT (OUTPATIENT)
Dept: UROLOGY | Facility: CLINIC | Age: 82
End: 2022-06-20
Payer: MEDICARE

## 2022-06-20 VITALS — WEIGHT: 168.19 LBS | BODY MASS INDEX: 26.4 KG/M2 | HEIGHT: 67 IN

## 2022-06-20 DIAGNOSIS — N39.0 RECURRENT UTI: ICD-10-CM

## 2022-06-20 DIAGNOSIS — C64.1 MALIGNANT NEOPLASM OF RIGHT KIDNEY: ICD-10-CM

## 2022-06-20 DIAGNOSIS — N32.81 OAB (OVERACTIVE BLADDER): ICD-10-CM

## 2022-06-20 DIAGNOSIS — M79.604 RIGHT LEG PAIN: Primary | ICD-10-CM

## 2022-06-20 PROCEDURE — 99999 PR PBB SHADOW E&M-EST. PATIENT-LVL III: CPT | Mod: PBBFAC,,, | Performed by: UROLOGY

## 2022-06-20 PROCEDURE — 99214 OFFICE O/P EST MOD 30 MIN: CPT | Mod: S$PBB,,, | Performed by: UROLOGY

## 2022-06-20 PROCEDURE — 99999 PR PBB SHADOW E&M-EST. PATIENT-LVL III: ICD-10-PCS | Mod: PBBFAC,,, | Performed by: UROLOGY

## 2022-06-20 PROCEDURE — 99214 PR OFFICE/OUTPT VISIT, EST, LEVL IV, 30-39 MIN: ICD-10-PCS | Mod: S$PBB,,, | Performed by: UROLOGY

## 2022-06-20 PROCEDURE — 99213 OFFICE O/P EST LOW 20 MIN: CPT | Mod: PBBFAC | Performed by: UROLOGY

## 2022-07-19 ENCOUNTER — PATIENT MESSAGE (OUTPATIENT)
Dept: RESEARCH | Facility: CLINIC | Age: 82
End: 2022-07-19
Payer: MEDICARE

## 2022-09-01 ENCOUNTER — OFFICE VISIT (OUTPATIENT)
Dept: FAMILY MEDICINE | Facility: CLINIC | Age: 82
End: 2022-09-01
Payer: MEDICARE

## 2022-09-01 VITALS
BODY MASS INDEX: 26.57 KG/M2 | DIASTOLIC BLOOD PRESSURE: 76 MMHG | HEART RATE: 68 BPM | HEIGHT: 67 IN | OXYGEN SATURATION: 95 % | TEMPERATURE: 98 F | WEIGHT: 169.31 LBS | SYSTOLIC BLOOD PRESSURE: 130 MMHG

## 2022-09-01 DIAGNOSIS — E03.9 HYPOTHYROIDISM, UNSPECIFIED TYPE: ICD-10-CM

## 2022-09-01 DIAGNOSIS — Z12.31 ENCOUNTER FOR SCREENING MAMMOGRAM FOR BREAST CANCER: ICD-10-CM

## 2022-09-01 DIAGNOSIS — Z78.0 MENOPAUSE: ICD-10-CM

## 2022-09-01 DIAGNOSIS — Z00.00 ANNUAL PHYSICAL EXAM: Primary | ICD-10-CM

## 2022-09-01 DIAGNOSIS — N63.20 LEFT BREAST MASS: ICD-10-CM

## 2022-09-01 PROCEDURE — 99999 PR PBB SHADOW E&M-EST. PATIENT-LVL V: ICD-10-PCS | Mod: PBBFAC,,, | Performed by: FAMILY MEDICINE

## 2022-09-01 PROCEDURE — 99215 OFFICE O/P EST HI 40 MIN: CPT | Mod: PBBFAC,PO | Performed by: FAMILY MEDICINE

## 2022-09-01 PROCEDURE — 99999 PR PBB SHADOW E&M-EST. PATIENT-LVL V: CPT | Mod: PBBFAC,,, | Performed by: FAMILY MEDICINE

## 2022-09-01 PROCEDURE — 99387 INIT PM E/M NEW PAT 65+ YRS: CPT | Mod: S$PBB,GZ,, | Performed by: FAMILY MEDICINE

## 2022-09-01 PROCEDURE — 99387 PR PREVENTIVE VISIT,NEW,65 & OVER: ICD-10-PCS | Mod: S$PBB,GZ,, | Performed by: FAMILY MEDICINE

## 2022-09-01 NOTE — PROGRESS NOTES
"Routine Office Visit    Lola Lerner  1940  [id number]      Subjective     Lola is a 82 y.o. female who presents today for:    Annual exam / new to me - she has a primary that she has seen in the past and forgot his name   Breast mass - left side - Patient discovered this a few weeks ago. It does not hurt, cause pain or drain. No nipple discharge or discomfort. She has not had a mammogram done in years. No family history of breast cancer. 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   HENT:  Negative for congestion and hearing loss.    Eyes:  Negative for blurred vision and discharge.   Respiratory:  Negative for cough and wheezing.    Cardiovascular:  Negative for chest pain and palpitations.   Gastrointestinal:  Negative for abdominal pain, blood in stool, constipation, diarrhea, heartburn, nausea and vomiting.   Genitourinary:  Negative for dysuria and hematuria.   Musculoskeletal:  Negative for myalgias and neck pain.   Skin:  Negative for itching and rash.   Neurological:  Negative for dizziness, weakness and headaches.   Endo/Heme/Allergies:  Negative for polydipsia.   Psychiatric/Behavioral:  Negative for depression.      /76 (BP Location: Left arm, Patient Position: Sitting, BP Method: Medium (Manual))   Pulse 68   Temp 97.7 °F (36.5 °C) (Oral)   Ht 5' 7" (1.702 m)   Wt 76.8 kg (169 lb 5 oz)   SpO2 95%   BMI 26.52 kg/m²   Physical Exam  Constitutional:       Appearance: She is well-developed.   HENT:      Head: Normocephalic and atraumatic.   Eyes:      Conjunctiva/sclera: Conjunctivae normal.      Pupils: Pupils are equal, round, and reactive to light.   Cardiovascular:      Rate and Rhythm: Normal rate and regular rhythm.      Heart sounds: Normal heart sounds. No murmur heard.    No friction rub. No gallop.   Pulmonary:      Effort: No respiratory distress.      Breath sounds: Normal breath sounds. "   Chest:       Abdominal:      General: Bowel sounds are normal. There is no distension.      Palpations: Abdomen is soft.      Tenderness: There is no abdominal tenderness.   Musculoskeletal:         General: Normal range of motion.      Cervical back: Normal range of motion and neck supple.   Lymphadenopathy:      Cervical: No cervical adenopathy.   Skin:     General: Skin is warm.   Neurological:      Mental Status: She is alert and oriented to person, place, and time.         Assessment     Problem List Items Addressed This Visit    None  Visit Diagnoses       Annual physical exam    -  Primary    Relevant Orders    CBC Auto Differential    Comprehensive Metabolic Panel    Lipid Panel    TSH  Health Maintenance         Date Due Completion Date    TETANUS VACCINE Never done ---    DEXA Scan 06/15/1998 6/15/1995 (Done)    Override on 6/15/1995: Done    Shingles Vaccine (1 of 2) 03/19/2013 1/22/2013    COVID-19 Vaccine (3 - Moderna risk series) 11/26/2021 10/29/2021    Lipid Panel 01/26/2022 1/26/2017    Override on 4/2/2015: Done    Influenza Vaccine (1) 09/01/2022 9/29/2020          I addressed all major concerns as it related to health maintenance.  All were ordered and scheduled based on the patients wishes.  Any additional health maintenance will be readdressed at the next physical if declined or deferred by the patient.      Left breast mass        Relevant Orders    Mammo Digital Screening Bilat w/ Jovanny    US Breast Left Limited    Encounter for screening mammogram for breast cancer        Relevant Orders    Mammo Digital Screening Bilat w/ Jovanny    Menopause        Relevant Orders    DXA Bone Density Spine And Hip    Hypothyroidism, unspecified type        Relevant Orders    CBC Auto Differential    Comprehensive Metabolic Panel    Lipid Panel    TSH  The current medical regimen is effective;  continue present plan and medications.              Follow up in about 1 year (around 9/1/2023), or if symptoms  worsen or fail to improve, for yearly exam.

## 2022-09-07 ENCOUNTER — LAB VISIT (OUTPATIENT)
Dept: LAB | Facility: HOSPITAL | Age: 82
End: 2022-09-07
Attending: FAMILY MEDICINE
Payer: MEDICARE

## 2022-09-07 DIAGNOSIS — Z00.00 ANNUAL PHYSICAL EXAM: ICD-10-CM

## 2022-09-07 DIAGNOSIS — E03.9 HYPOTHYROIDISM, UNSPECIFIED TYPE: ICD-10-CM

## 2022-09-07 LAB
ALBUMIN SERPL BCP-MCNC: 4.2 G/DL (ref 3.5–5.2)
ALP SERPL-CCNC: 66 U/L (ref 55–135)
ALT SERPL W/O P-5'-P-CCNC: 18 U/L (ref 10–44)
ANION GAP SERPL CALC-SCNC: 11 MMOL/L (ref 8–16)
AST SERPL-CCNC: 22 U/L (ref 10–40)
BASOPHILS # BLD AUTO: 0.04 K/UL (ref 0–0.2)
BASOPHILS NFR BLD: 0.4 % (ref 0–1.9)
BILIRUB SERPL-MCNC: 1.3 MG/DL (ref 0.1–1)
BUN SERPL-MCNC: 14 MG/DL (ref 8–23)
CALCIUM SERPL-MCNC: 9.4 MG/DL (ref 8.7–10.5)
CHLORIDE SERPL-SCNC: 104 MMOL/L (ref 95–110)
CHOLEST SERPL-MCNC: 137 MG/DL (ref 120–199)
CHOLEST/HDLC SERPL: 2.7 {RATIO} (ref 2–5)
CO2 SERPL-SCNC: 26 MMOL/L (ref 23–29)
CREAT SERPL-MCNC: 0.8 MG/DL (ref 0.5–1.4)
DIFFERENTIAL METHOD: ABNORMAL
EOSINOPHIL # BLD AUTO: 0.1 K/UL (ref 0–0.5)
EOSINOPHIL NFR BLD: 1.6 % (ref 0–8)
ERYTHROCYTE [DISTWIDTH] IN BLOOD BY AUTOMATED COUNT: 12.9 % (ref 11.5–14.5)
EST. GFR  (NO RACE VARIABLE): >60 ML/MIN/1.73 M^2
GLUCOSE SERPL-MCNC: 106 MG/DL (ref 70–110)
HCT VFR BLD AUTO: 45.3 % (ref 37–48.5)
HDLC SERPL-MCNC: 50 MG/DL (ref 40–75)
HDLC SERPL: 36.5 % (ref 20–50)
HGB BLD-MCNC: 15 G/DL (ref 12–16)
IMM GRANULOCYTES # BLD AUTO: 0.03 K/UL (ref 0–0.04)
IMM GRANULOCYTES NFR BLD AUTO: 0.3 % (ref 0–0.5)
LDLC SERPL CALC-MCNC: 68.2 MG/DL (ref 63–159)
LYMPHOCYTES # BLD AUTO: 2.2 K/UL (ref 1–4.8)
LYMPHOCYTES NFR BLD: 24.7 % (ref 18–48)
MCH RBC QN AUTO: 31.1 PG (ref 27–31)
MCHC RBC AUTO-ENTMCNC: 33.1 G/DL (ref 32–36)
MCV RBC AUTO: 94 FL (ref 82–98)
MONOCYTES # BLD AUTO: 0.6 K/UL (ref 0.3–1)
MONOCYTES NFR BLD: 6.5 % (ref 4–15)
NEUTROPHILS # BLD AUTO: 5.9 K/UL (ref 1.8–7.7)
NEUTROPHILS NFR BLD: 66.5 % (ref 38–73)
NONHDLC SERPL-MCNC: 87 MG/DL
NRBC BLD-RTO: 0 /100 WBC
PLATELET # BLD AUTO: 255 K/UL (ref 150–450)
PMV BLD AUTO: 11.7 FL (ref 9.2–12.9)
POTASSIUM SERPL-SCNC: 4 MMOL/L (ref 3.5–5.1)
PROT SERPL-MCNC: 7.5 G/DL (ref 6–8.4)
RBC # BLD AUTO: 4.82 M/UL (ref 4–5.4)
SODIUM SERPL-SCNC: 141 MMOL/L (ref 136–145)
TRIGL SERPL-MCNC: 94 MG/DL (ref 30–150)
TSH SERPL DL<=0.005 MIU/L-ACNC: 1.89 UIU/ML (ref 0.4–4)
WBC # BLD AUTO: 8.94 K/UL (ref 3.9–12.7)

## 2022-09-07 PROCEDURE — 80053 COMPREHEN METABOLIC PANEL: CPT | Performed by: FAMILY MEDICINE

## 2022-09-07 PROCEDURE — 84443 ASSAY THYROID STIM HORMONE: CPT | Performed by: FAMILY MEDICINE

## 2022-09-07 PROCEDURE — 36415 COLL VENOUS BLD VENIPUNCTURE: CPT | Mod: PO | Performed by: FAMILY MEDICINE

## 2022-09-07 PROCEDURE — 85025 COMPLETE CBC W/AUTO DIFF WBC: CPT | Performed by: FAMILY MEDICINE

## 2022-09-07 PROCEDURE — 80061 LIPID PANEL: CPT | Performed by: FAMILY MEDICINE

## 2022-09-14 ENCOUNTER — HOSPITAL ENCOUNTER (OUTPATIENT)
Dept: RADIOLOGY | Facility: HOSPITAL | Age: 82
Discharge: HOME | End: 2022-09-14
Attending: FAMILY MEDICINE
Payer: MEDICARE

## 2022-09-14 ENCOUNTER — HOSPITAL ENCOUNTER (OUTPATIENT)
Dept: RADIOLOGY | Facility: CLINIC | Age: 82
Discharge: HOME | End: 2022-09-14
Attending: FAMILY MEDICINE
Payer: MEDICARE

## 2022-09-14 DIAGNOSIS — Z78.0 MENOPAUSE: ICD-10-CM

## 2022-09-14 DIAGNOSIS — N63.20 LEFT BREAST MASS: ICD-10-CM

## 2022-09-14 PROCEDURE — 77062 BREAST TOMOSYNTHESIS BI: CPT | Mod: TC

## 2022-09-14 PROCEDURE — 77062 BREAST TOMOSYNTHESIS BI: CPT | Mod: 26,,, | Performed by: RADIOLOGY

## 2022-09-14 PROCEDURE — 76642 US BREAST LEFT LIMITED: ICD-10-PCS | Mod: 26,LT,, | Performed by: RADIOLOGY

## 2022-09-14 PROCEDURE — 76642 ULTRASOUND BREAST LIMITED: CPT | Mod: TC,LT

## 2022-09-14 PROCEDURE — 77080 DXA BONE DENSITY AXIAL: CPT | Mod: TC,PO

## 2022-09-14 PROCEDURE — 77080 DXA BONE DENSITY AXIAL: CPT | Mod: 26,,, | Performed by: INTERNAL MEDICINE

## 2022-09-14 PROCEDURE — 77066 MAMMO DIGITAL DIAGNOSTIC BILAT WITH TOMO: ICD-10-PCS | Mod: 26,,, | Performed by: RADIOLOGY

## 2022-09-14 PROCEDURE — 77062 MAMMO DIGITAL DIAGNOSTIC BILAT WITH TOMO: ICD-10-PCS | Mod: 26,,, | Performed by: RADIOLOGY

## 2022-09-14 PROCEDURE — 76642 ULTRASOUND BREAST LIMITED: CPT | Mod: 26,LT,, | Performed by: RADIOLOGY

## 2022-09-14 PROCEDURE — 77080 DEXA BONE DENSITY SPINE HIP: ICD-10-PCS | Mod: 26,,, | Performed by: INTERNAL MEDICINE

## 2022-09-14 PROCEDURE — 77066 DX MAMMO INCL CAD BI: CPT | Mod: 26,,, | Performed by: RADIOLOGY

## 2022-09-15 ENCOUNTER — TELEPHONE (OUTPATIENT)
Dept: RADIOLOGY | Facility: HOSPITAL | Age: 82
End: 2022-09-15
Payer: MEDICARE

## 2022-09-15 NOTE — TELEPHONE ENCOUNTER
Spoke with patient. Reviewed breast biopsy procedure and reviewed instructions for breast biopsy. Patient expressed understanding and all questions were answered. Provided patient with my phone number to call for any further concerns or questions.   Patient scheduled breast biopsy at the [facility] on 9/20/2022.

## 2022-09-20 ENCOUNTER — HOSPITAL ENCOUNTER (OUTPATIENT)
Dept: RADIOLOGY | Facility: HOSPITAL | Age: 82
Discharge: HOME | End: 2022-09-20
Attending: FAMILY MEDICINE
Payer: MEDICARE

## 2022-09-20 DIAGNOSIS — R92.8 ABNORMAL FINDING ON BREAST IMAGING: Primary | ICD-10-CM

## 2022-09-20 PROCEDURE — 88377 M/PHMTRC ALYS ISHQUANT/SEMIQ: CPT | Performed by: STUDENT IN AN ORGANIZED HEALTH CARE EDUCATION/TRAINING PROGRAM

## 2022-09-20 PROCEDURE — 88342 CHG IMMUNOCYTOCHEMISTRY: ICD-10-PCS | Mod: 26,XU,, | Performed by: STUDENT IN AN ORGANIZED HEALTH CARE EDUCATION/TRAINING PROGRAM

## 2022-09-20 PROCEDURE — 27200939 US BREAST BIOPSY WITH IMAGING 1ST SITE LEFT

## 2022-09-20 PROCEDURE — 88341 IMHCHEM/IMCYTCHM EA ADD ANTB: CPT | Mod: 59 | Performed by: STUDENT IN AN ORGANIZED HEALTH CARE EDUCATION/TRAINING PROGRAM

## 2022-09-20 PROCEDURE — 88341 IMHCHEM/IMCYTCHM EA ADD ANTB: CPT | Mod: 26,XU,, | Performed by: STUDENT IN AN ORGANIZED HEALTH CARE EDUCATION/TRAINING PROGRAM

## 2022-09-20 PROCEDURE — 77065 MAMMO DIGITAL DIAGNOSTIC LEFT: ICD-10-PCS | Mod: 26,LT,, | Performed by: RADIOLOGY

## 2022-09-20 PROCEDURE — 88342 IMHCHEM/IMCYTCHM 1ST ANTB: CPT | Mod: 59 | Performed by: STUDENT IN AN ORGANIZED HEALTH CARE EDUCATION/TRAINING PROGRAM

## 2022-09-20 PROCEDURE — 88360 PR  TUMOR IMMUNOHISTOCHEM/MANUAL: ICD-10-PCS | Mod: 26,,, | Performed by: STUDENT IN AN ORGANIZED HEALTH CARE EDUCATION/TRAINING PROGRAM

## 2022-09-20 PROCEDURE — 88305 TISSUE EXAM BY PATHOLOGIST: CPT | Mod: 26,,, | Performed by: STUDENT IN AN ORGANIZED HEALTH CARE EDUCATION/TRAINING PROGRAM

## 2022-09-20 PROCEDURE — 77065 DX MAMMO INCL CAD UNI: CPT | Mod: TC,LT

## 2022-09-20 PROCEDURE — 88360 TUMOR IMMUNOHISTOCHEM/MANUAL: CPT | Mod: 26,,, | Performed by: STUDENT IN AN ORGANIZED HEALTH CARE EDUCATION/TRAINING PROGRAM

## 2022-09-20 PROCEDURE — 88342 IMHCHEM/IMCYTCHM 1ST ANTB: CPT | Mod: 26,XU,, | Performed by: STUDENT IN AN ORGANIZED HEALTH CARE EDUCATION/TRAINING PROGRAM

## 2022-09-20 PROCEDURE — 25000003 PHARM REV CODE 250: Performed by: FAMILY MEDICINE

## 2022-09-20 PROCEDURE — 88305 TISSUE EXAM BY PATHOLOGIST: CPT | Performed by: STUDENT IN AN ORGANIZED HEALTH CARE EDUCATION/TRAINING PROGRAM

## 2022-09-20 PROCEDURE — 88341 PR IHC OR ICC EACH ADD'L SINGLE ANTIBODY  STAINPR: ICD-10-PCS | Mod: 26,XU,, | Performed by: STUDENT IN AN ORGANIZED HEALTH CARE EDUCATION/TRAINING PROGRAM

## 2022-09-20 PROCEDURE — 77065 DX MAMMO INCL CAD UNI: CPT | Mod: 26,LT,, | Performed by: RADIOLOGY

## 2022-09-20 PROCEDURE — 19083 US BREAST BIOPSY WITH IMAGING 1ST SITE LEFT: ICD-10-PCS | Mod: LT,,, | Performed by: RADIOLOGY

## 2022-09-20 PROCEDURE — 19083 BX BREAST 1ST LESION US IMAG: CPT | Mod: LT,,, | Performed by: RADIOLOGY

## 2022-09-20 PROCEDURE — 88360 TUMOR IMMUNOHISTOCHEM/MANUAL: CPT | Mod: 59 | Performed by: STUDENT IN AN ORGANIZED HEALTH CARE EDUCATION/TRAINING PROGRAM

## 2022-09-20 PROCEDURE — 88305 TISSUE EXAM BY PATHOLOGIST: ICD-10-PCS | Mod: 26,,, | Performed by: STUDENT IN AN ORGANIZED HEALTH CARE EDUCATION/TRAINING PROGRAM

## 2022-09-20 RX ORDER — LIDOCAINE HYDROCHLORIDE 10 MG/ML
3 INJECTION, SOLUTION EPIDURAL; INFILTRATION; INTRACAUDAL; PERINEURAL ONCE
Status: COMPLETED | OUTPATIENT
Start: 2022-09-20 | End: 2022-09-20

## 2022-09-20 RX ORDER — LIDOCAINE HYDROCHLORIDE AND EPINEPHRINE 10; 10 MG/ML; UG/ML
10 INJECTION, SOLUTION INFILTRATION; PERINEURAL ONCE
Status: COMPLETED | OUTPATIENT
Start: 2022-09-20 | End: 2022-09-20

## 2022-09-20 RX ADMIN — LIDOCAINE HYDROCHLORIDE 3 ML: 10 INJECTION, SOLUTION EPIDURAL; INFILTRATION; INTRACAUDAL; PERINEURAL at 02:09

## 2022-09-20 RX ADMIN — LIDOCAINE HYDROCHLORIDE,EPINEPHRINE BITARTRATE 10 ML: 10; .01 INJECTION, SOLUTION INFILTRATION; PERINEURAL at 02:09

## 2022-09-26 ENCOUNTER — OFFICE VISIT (OUTPATIENT)
Dept: SURGERY | Facility: CLINIC | Age: 82
End: 2022-09-26
Payer: MEDICARE

## 2022-09-26 ENCOUNTER — TELEPHONE (OUTPATIENT)
Dept: FAMILY MEDICINE | Facility: CLINIC | Age: 82
End: 2022-09-26
Payer: MEDICARE

## 2022-09-26 ENCOUNTER — TELEPHONE (OUTPATIENT)
Dept: HEMATOLOGY/ONCOLOGY | Facility: CLINIC | Age: 82
End: 2022-09-26
Payer: MEDICARE

## 2022-09-26 ENCOUNTER — DOCUMENTATION ONLY (OUTPATIENT)
Dept: HEMATOLOGY/ONCOLOGY | Facility: CLINIC | Age: 82
End: 2022-09-26
Payer: MEDICARE

## 2022-09-26 ENCOUNTER — DOCUMENTATION ONLY (OUTPATIENT)
Dept: SURGERY | Facility: CLINIC | Age: 82
End: 2022-09-26
Payer: MEDICARE

## 2022-09-26 VITALS
BODY MASS INDEX: 25.58 KG/M2 | DIASTOLIC BLOOD PRESSURE: 67 MMHG | SYSTOLIC BLOOD PRESSURE: 152 MMHG | HEIGHT: 67 IN | WEIGHT: 163 LBS | HEART RATE: 67 BPM

## 2022-09-26 DIAGNOSIS — C50.919 INVASIVE LOBULAR CARCINOMA OF BREAST IN FEMALE: Primary | ICD-10-CM

## 2022-09-26 DIAGNOSIS — N63.20 LEFT BREAST MASS: Primary | ICD-10-CM

## 2022-09-26 DIAGNOSIS — C50.919 INVASIVE LOBULAR CARCINOMA OF BREAST IN FEMALE: ICD-10-CM

## 2022-09-26 PROCEDURE — 99204 PR OFFICE/OUTPT VISIT, NEW, LEVL IV, 45-59 MIN: ICD-10-PCS | Mod: S$PBB,,, | Performed by: SURGERY

## 2022-09-26 PROCEDURE — 99999 PR PBB SHADOW E&M-EST. PATIENT-LVL IV: CPT | Mod: PBBFAC,,, | Performed by: SURGERY

## 2022-09-26 PROCEDURE — 99999 PR PBB SHADOW E&M-EST. PATIENT-LVL IV: ICD-10-PCS | Mod: PBBFAC,,, | Performed by: SURGERY

## 2022-09-26 PROCEDURE — 99204 OFFICE O/P NEW MOD 45 MIN: CPT | Mod: S$PBB,,, | Performed by: SURGERY

## 2022-09-26 PROCEDURE — 99214 OFFICE O/P EST MOD 30 MIN: CPT | Mod: PBBFAC | Performed by: SURGERY

## 2022-09-26 NOTE — PROGRESS NOTES
Genetics Lay Navigator Note:    Met with patient and her  at her consult with Dr. Spencer today 9/26/2022, to facilitate genetic testing. Patient declined genetic testing today stating she would like to think/discuss this more with her  and family before proceeding. [employer] brochure as well as my card with my direct number provided to patient and her  to contact with questions. Encouraged patient to call me or [employer] at any time.     Dr. Spencer and his nurse notified of this information.

## 2022-09-26 NOTE — NURSING
Nurse Navigator Note:     Met with patient during her consult with Dr. Spencer.  Patient and I reviewed the information she discussed with Dr. Spencer, including treatment options, diagnosis, and future plans for workup. Patient and I went through the new patient binder, explained some of the information and why it is provided.     Also offered patient consults with our other specialty clinics: Dr. Price for Integrative Therapies, Survivorship and/or Women's Gynecologic needs, our breast physical therapy department for pre-op and post-operative assessments, Oncologic Psychology for psychological support, and Oncologic Nutrition for nutritional counseling. Explained to patient that all of these support services are completely optional. Discussed that physical therapy may call patient to offer pre-op appt, and what that appt would entail.     Patient was given a copy of her appointments, Dr. Spencer's card, and my card. Encouraged her to call me if she has any questions or concerns or would like to schedule any additional appointments. Verbalized understanding of all information.    Oncology Navigation   Intake  Date of Diagnosis: 09/20/22  Cancer Type: Breast  Internal / External Referral: Internal  Date of Referral: 09/26/22  Initial Nurse Navigator Contact: 09/26/22  Referral to Initial Contact Timeline (days): 0  Date Worked: 09/26/22  First Appointment Available: 09/26/22  Appointment Date: 09/26/22  First Available Date vs. Scheduled Date (days): 0     Treatment  Current Status: Staging work-up    Surgical Oncologist: [place]  Consult Date: 09/26/22          Procedures: Biopsy; MRI; Ultrasound; Dexa; Mammogram  Biopsy Schedule Date: 09/20/22  DEXA Schedule Date: 09/14/22  Mammo Schedule Date: 09/14/22  MRI Schedule Date: 09/27/22  Ultrasound Schedule Date: 09/14/22    General Referrals: Integrative Medicine                                             hide    ER: Positive  MI: Positive  Her2: -- (fish)       Support  Systems: Spouse/significant other     Acuity  Treatment Tolerability: Has not started treatment yet/treatment fully completed and side effects resolved   Needed: 0  Support: 0  Verbalizes Financial Concerns: 0  Transportation: 0  History of noncompliance/frequent no shows and cancellations: 0  Verbalizes the need for more education: 0  Navigation Acuity: 0     Follow Up  No follow-ups on file.

## 2022-09-26 NOTE — PROGRESS NOTES
Surgical Oncology Breast Clinic  [facility]  Department of Surgery    Referring Provider: Maura Cohn MD  [address] [place] [address]    Subjective:      Patient ID: Lola Lerner is a 82 y.o. female who presents with left breast mass. Patient initially noticed the breast mass on self exam. She denies pain, skin changes, and nipple drainage/discharge. States that she is otherwise feeling well. She had a mammogram  which showed a mass suspicious for malignancy. Biopsy performed on  showed ER+ DE+ invasive lobular carcinoma, equivocal for HER2, FISH pending.    Patient denies previous breast biopsy and reports that she has not had an abnormal mammogram. Patient denies a personal history of breast cancer. Denies family history of breast cancer but reports that her mother  young.    Findings at that time were the following:   Tumor size: 2.6 x 3.3 x 2.9 cm   Tumor grade: 2/3   Estrogen Receptor: +   Progesterone Receptor: +   Her-2 sammie: equivocal, FISH pending  Lymph node status: no palpable LN   Lymphatic invasion: none identified on biopsy     GYN History:  Age of menarche was 14. Age of menopause was [age]. Patient is . Patient breast feed for a couple of weeks.      PMH:   Past Medical History:   Diagnosis Date    Hyperlipidemia     Hypertension     Urge incontinence     sees Dr. Julien, urology    Vaginal delivery     x3     Past Surgical History:   Past Surgical History:   Procedure Laterality Date    BREAST BIOPSY Right     Core bx,. 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        Allergies:  "Review of patient's allergies indicates:  No Known Allergies    Medications:  - Atrovent  - Synthroid  - Toprolol  - Crestor  - Losartan-HCTZ  - ASA  -Myrbetriq    Review of Systems   All other systems reviewed and are negative.    Objective:   Ht 5' 7" (1.702 m)   Wt 73.9 kg (163 lb)   BMI 25.53 kg/m²     Physical Exam:  Physical Exam   Constitutional: She does not appear ill. No distress.   HENT:   Head: Normocephalic and atraumatic.   Eyes: Conjunctivae are normal.   Cardiovascular:  Normal rate and regular rhythm.            Pulmonary/Chest: Effort normal. No respiratory distress. Right breast exhibits no inverted nipple, no mass, no nipple discharge and no tenderness. Left breast exhibits mass. Left breast exhibits no inverted nipple, no nipple discharge and no tenderness.       Abdominal: Soft. She exhibits no distension.   Neurological: She is alert.   Skin: Skin is warm and dry.       Radiology review: Images personally reviewed by me in the clinic. - mammogram and US    Assessment:   Lola Lerner is a 82 y.o. female with PMH HTN, HLD, hypothyroidism, and overactive bladder presenting with left breast mass with invasive lobular carcinoma on biopsy.    Plan:   -We discussed the options for management of invasive lobular carcinoma.  - Will plan for breast MRI to better characterize mass  - F/u in clinic after MRI to discuss next steps    Patient seen and discussed with Dr. Spencer.    Toro Farris MD   [facility] PGY-1   "

## 2022-09-26 NOTE — TELEPHONE ENCOUNTER
Called Patient with results of breast biopsy from 09/20/22.  Explained that the biopsy showed invasive lobular carcinoma of the left breast . Discussed what this means and that the next step is to meet with a breast surgeon. An appt was made for 09/26/22 with Dr. Spencer.  Reviewed location of breast center. Patient verbalized understanding.       ----- Message from Gricelda Joy MD sent at 9/24/2022 11:54 AM CDT -----  The breast biopsy results are malignant and concordant.  Please schedule with breast surgery.

## 2022-09-26 NOTE — TELEPHONE ENCOUNTER
Called patient.   Discussed results of pathology report   Invasive lobular carcinoma   Will refer to breast surgery

## 2022-09-28 ENCOUNTER — DOCUMENTATION ONLY (OUTPATIENT)
Dept: HEMATOLOGY/ONCOLOGY | Facility: CLINIC | Age: 82
End: 2022-09-28
Payer: MEDICARE

## 2022-09-28 NOTE — PROGRESS NOTES
Nurse Navigator Note:     Met with patient during her consult with Dr. Spencer.  Patient and I reviewed the information she discussed with Dr. Spencer, including treatment options, diagnosis, and future plans for workup. Patient and I went through the new patient binder, explained some of the information and why it is provided.     Also offered patient consults with our other specialty clinics: Dr. Price for Integrative Therapies, Survivorship and/or Women's Gynecologic needs, our breast physical therapy department for pre-op and post-operative assessments, Oncologic Psychology for psychological support, and Oncologic Nutrition for nutritional counseling. Explained to patient that all of these support services are completely optional. Discussed that physical therapy may call patient to offer pre-op appt, and what that appt would entail.     Patient was given a copy of her appointments, Dr. Spencer's card, and my card. Encouraged her to call me if she has any questions or concerns or would like to schedule any additional appointments. Verbalized understanding of all information.    Oncology Navigation   Intake  Date of Diagnosis: 09/20/22  Cancer Type: Breast  Internal / External Referral: Internal  Date of Referral: 09/26/22  Initial Nurse Navigator Contact: 09/26/22  Referral to Initial Contact Timeline (days): 0  Date Worked: 09/26/22  First Appointment Available: 09/26/22  Appointment Date: 09/26/22  First Available Date vs. Scheduled Date (days): 0     Treatment  Current Status: Staging work-up    Surgical Oncologist: [place]  Consult Date: 09/26/22    Medical Oncologist: Anish  Consult Date: 10/03/22    Radiation Oncologist: Hayley    Procedures: Biopsy; MRI; Ultrasound; Dexa; Mammogram  Biopsy Schedule Date: 09/20/22  DEXA Schedule Date: 09/14/22  Mammo Schedule Date: 09/14/22  MRI Schedule Date: 09/27/22  Ultrasound Schedule Date: 09/14/22    General Referrals: Integrative Medicine                                              hide    ER: Positive  KS: Positive  Her2: -- (fish)    Radiation Oncologist: Hayley    Support Systems: Spouse/significant other     Acuity  Treatment Tolerability: Has not started treatment yet/treatment fully completed and side effects resolved   Needed: 0  Support: 0  Verbalizes Financial Concerns: 0  Transportation: 0  History of noncompliance/frequent no shows and cancellations: 0  Verbalizes the need for more education: 0  Navigation Acuity: 0     Follow Up  No follow-ups on file.

## 2022-09-29 ENCOUNTER — HOSPITAL ENCOUNTER (OUTPATIENT)
Dept: RADIOLOGY | Facility: OTHER | Age: 82
Discharge: HOME | End: 2022-09-29
Attending: SURGERY
Payer: MEDICARE

## 2022-09-29 DIAGNOSIS — C50.919 INVASIVE LOBULAR CARCINOMA OF BREAST IN FEMALE: ICD-10-CM

## 2022-09-29 PROCEDURE — 77047 MRI BREAST C- BILATERAL: CPT | Mod: TC

## 2022-09-29 PROCEDURE — A9577 INJ MULTIHANCE: HCPCS | Performed by: SURGERY

## 2022-09-29 PROCEDURE — 25500020 PHARM REV CODE 255: Performed by: SURGERY

## 2022-09-29 PROCEDURE — 77047 MRI BREAST C- BILATERAL: CPT | Mod: 26,,, | Performed by: RADIOLOGY

## 2022-09-29 PROCEDURE — 77047 MRI BREAST W/O CONTRAST, BILATERAL: ICD-10-PCS | Mod: 26,,, | Performed by: RADIOLOGY

## 2022-09-29 RX ADMIN — GADOBENATE DIMEGLUMINE 15 ML: 529 INJECTION, SOLUTION INTRAVENOUS at 10:09

## 2022-10-03 ENCOUNTER — OFFICE VISIT (OUTPATIENT)
Dept: HEMATOLOGY/ONCOLOGY | Facility: CLINIC | Age: 82
End: 2022-10-03
Payer: MEDICARE

## 2022-10-03 ENCOUNTER — OFFICE VISIT (OUTPATIENT)
Dept: RADIATION ONCOLOGY | Facility: CLINIC | Age: 82
End: 2022-10-03
Payer: MEDICARE

## 2022-10-03 ENCOUNTER — OFFICE VISIT (OUTPATIENT)
Dept: SURGERY | Facility: CLINIC | Age: 82
End: 2022-10-03
Payer: MEDICARE

## 2022-10-03 VITALS
HEART RATE: 69 BPM | WEIGHT: 165.13 LBS | OXYGEN SATURATION: 97 % | BODY MASS INDEX: 25.92 KG/M2 | SYSTOLIC BLOOD PRESSURE: 174 MMHG | TEMPERATURE: 98 F | HEIGHT: 67 IN | RESPIRATION RATE: 18 BRPM | DIASTOLIC BLOOD PRESSURE: 72 MMHG

## 2022-10-03 VITALS
HEART RATE: 69 BPM | WEIGHT: 165.13 LBS | OXYGEN SATURATION: 97 % | RESPIRATION RATE: 18 BRPM | HEIGHT: 67 IN | BODY MASS INDEX: 25.92 KG/M2 | DIASTOLIC BLOOD PRESSURE: 72 MMHG | SYSTOLIC BLOOD PRESSURE: 174 MMHG | TEMPERATURE: 98 F

## 2022-10-03 DIAGNOSIS — N32.81 OAB (OVERACTIVE BLADDER): ICD-10-CM

## 2022-10-03 DIAGNOSIS — E78.2 MIXED HYPERLIPIDEMIA: ICD-10-CM

## 2022-10-03 DIAGNOSIS — C64.1 MALIGNANT NEOPLASM OF RIGHT KIDNEY: Primary | ICD-10-CM

## 2022-10-03 DIAGNOSIS — C50.912 INVASIVE LOBULAR CARCINOMA OF BREAST, STAGE 2, LEFT: ICD-10-CM

## 2022-10-03 DIAGNOSIS — C50.919 INVASIVE LOBULAR CARCINOMA OF BREAST IN FEMALE: Primary | ICD-10-CM

## 2022-10-03 DIAGNOSIS — C50.912 INVASIVE LOBULAR CARCINOMA OF BREAST, STAGE 2, LEFT: Primary | ICD-10-CM

## 2022-10-03 DIAGNOSIS — I10 PRIMARY HYPERTENSION: ICD-10-CM

## 2022-10-03 PROCEDURE — 99213 OFFICE O/P EST LOW 20 MIN: CPT | Mod: PBBFAC | Performed by: RADIOLOGY

## 2022-10-03 PROCEDURE — 99999 PR PBB SHADOW E&M-EST. PATIENT-LVL IV: ICD-10-PCS | Mod: PBBFAC,,, | Performed by: INTERNAL MEDICINE

## 2022-10-03 PROCEDURE — 99205 PR OFFICE/OUTPT VISIT, NEW, LEVL V, 60-74 MIN: ICD-10-PCS | Mod: S$PBB,,, | Performed by: INTERNAL MEDICINE

## 2022-10-03 PROCEDURE — 99205 OFFICE O/P NEW HI 60 MIN: CPT | Mod: S$PBB,,, | Performed by: INTERNAL MEDICINE

## 2022-10-03 PROCEDURE — 99214 OFFICE O/P EST MOD 30 MIN: CPT | Mod: PBBFAC,25 | Performed by: INTERNAL MEDICINE

## 2022-10-03 PROCEDURE — 99999 PR PBB SHADOW E&M-EST. PATIENT-LVL IV: CPT | Mod: PBBFAC,,, | Performed by: INTERNAL MEDICINE

## 2022-10-03 PROCEDURE — 99214 PR OFFICE/OUTPT VISIT, EST, LEVL IV, 30-39 MIN: ICD-10-PCS | Mod: S$PBB,,, | Performed by: SURGERY

## 2022-10-03 PROCEDURE — 99205 OFFICE O/P NEW HI 60 MIN: CPT | Mod: S$PBB,,, | Performed by: RADIOLOGY

## 2022-10-03 PROCEDURE — 99214 OFFICE O/P EST MOD 30 MIN: CPT | Mod: S$PBB,,, | Performed by: SURGERY

## 2022-10-03 PROCEDURE — 99205 PR OFFICE/OUTPT VISIT, NEW, LEVL V, 60-74 MIN: ICD-10-PCS | Mod: S$PBB,,, | Performed by: RADIOLOGY

## 2022-10-03 PROCEDURE — 99999 PR PBB SHADOW E&M-EST. PATIENT-LVL III: ICD-10-PCS | Mod: PBBFAC,,, | Performed by: RADIOLOGY

## 2022-10-03 PROCEDURE — 99999 PR PBB SHADOW E&M-EST. PATIENT-LVL III: CPT | Mod: PBBFAC,,, | Performed by: RADIOLOGY

## 2022-10-03 NOTE — PROGRESS NOTES
Subjective:       Patient ID: Lola Lerner is a 82 y.o. female.    Chief Complaint: No chief complaint on file.    HPI    - 9/14/2022 Diagnostic Mammogram:  Findings:  This procedure was performed using tomosynthesis. Computer-aided detection was utilized in the interpretation of this examination.  The breasts have scattered areas of fibroglandular density.   Mammo Digital Diagnostic Bilat with Jovanny  Left  Developing Asymmetry: There is a developing asymmetry seen in the upper inner quadrant of the left breast. The asymmetry correlates with the palpable mass and skin changes reported by the patient.   Right  There is no evidence of suspicious masses, calcifications, or other abnormal findings in the right breast.  US Breast Left Limited  Left  Mass: There is a 26 mm x 33 mm x 29 mm irregularly shaped, non-parallel, hypoechoic mass with indistinct margins with shadowing seen in the upper inner quadrant of the left breast at 10 o'clock, 2 cm from the nipple. The mass correlates with the palpable mass reported by the patient.    Targeted ultrasound of the left axilla is normal.  Impression:  Left  Mass: Left breast 26 mm x 33 mm x 29 mm mass at the upper inner 10 o'clock position. Assessment: 4A - Suspicious finding. Biopsy is recommended.   Right  There is no mammographic or sonographic evidence of malignancy in the right breast.  BI-RADS Category:   Overall: 4A - Low Suspicion for Malignancy  Recommendation:  Biopsy is recommended. I discussed these findings and recommendations with the patient and her  in detail at the time of exam.    - 9/20/2022 Breast Biopsy:  Breast, left, ultrasound-guided core needle biopsies of mass:   - Invasive lobular carcinoma       - Present in all sampled cores (6 mm in greatest contiguous dimension)       - Aparna-Pal modified SBR score 3 + 2 + 1 = 6 of 9       - Histologic grade 2 of 3       - Mitotic index = 0.1 (average mitoses per high power field) (low)   -  Biomarkers, assessed by immunohistochemistry on block 1A       - Estrogen Receptor (ER):  Positive (90%; strong intensity)       - Progesterone Receptor (KS):  Positive (70%; strong intensity)       - HER2:  Equivocal (2+)       - HER2 FISH pending; results to follow in a supplemental report       - Ki-67 proliferation index:  Averages 5-10%   - Lymphovascular invasion is not identified     - 9/29/2022 Unable to tolerate MRI      GynHx:    FH:    SH:    PMH:  Active Ambulatory Problems     Diagnosis Date Noted    Hypertension 07/17/2013    Hyperlipidemia 07/17/2013    Recurrent UTI 04/28/2016    OAB (overactive bladder) 04/28/2016    Malignant neoplasm of right kidney 08/17/2016     Resolved Ambulatory Problems     Diagnosis Date Noted    No Resolved Ambulatory Problems     Past Medical History:   Diagnosis Date    Urge incontinence     Vaginal delivery          Review of Systems      Objective:      Physical Exam    Assessment:       Problem List Items Addressed This Visit    None      Plan:       ***

## 2022-10-03 NOTE — PROGRESS NOTES
PATIENT IDENTIFICATION:  Patient Name: Lola Lerner  MRN: [id number]  : 1940    DIAGNOSIS:  Cancer Staging   Invasive lobular carcinoma of breast, stage 2, left  Staging form: Breast, AJCC 8th Edition  - Clinical: Stage IB (cT2, cN0, cM0, G2, ER+, FL+, HER2-) - Signed by Catherine Hardy MD on 10/3/2022    HISTORY OF PRESENT ILLNESS:   The patient is an 82 year old woman with a clinical Stage IIA invasive lobular carcinoma of the left breast.      The patient underwent diagnostic mammogram on 2022.  Imaging revealed an asymmetry in the upper inner quadrant of the left breast.  The mass measured 26 x 33 x 29 mm with indistinct margins on ultrasound.  Ultrasound-guided biopsy was performed of the mass which revealed grade 2 invasive lobular carcinoma.  On immunohistochemistry, tumor cells ERPR positive and HER2 negative by fish.  The Ki-67 proliferative index averages 5-10%.    MRI was ordered for further evaluation but the patient was not able to tolerate the exam.    The patient was referred to Dr. Spencer for surgical management.  She is scheduled for lumpectomy with sentinel lymph node dissection.      GYN History:  Age of menarche was 14. Age of menopause was [age]. Patient is . Patient breast feed for a couple of weeks    Oncology History   Invasive lobular carcinoma of breast, stage 2, left   10/3/2022 Initial Diagnosis    Invasive lobular carcinoma of breast, stage 2, left     10/3/2022 Cancer Staged    Staging form: Breast, AJCC 8th Edition  - Clinical: Stage IB (cT2, cN0, cM0, G2, ER+, FL+, HER2-)            REVIEW OF SYSTEMS:   Review of Systems   Constitutional:  Negative for fever, malaise/fatigue and weight loss.   HENT:  Negative for ear pain, hearing loss, sinus pain and sore throat.    Eyes:  Negative for blurred vision, double vision and pain.   Respiratory:  Negative for cough, hemoptysis, shortness of breath and wheezing.    Cardiovascular:  Negative for chest pain, palpitations  and leg swelling.   Gastrointestinal:  Negative for abdominal pain, blood in stool, constipation, diarrhea, heartburn, nausea and vomiting.   Genitourinary:  Positive for frequency. Negative for dysuria, hematuria and urgency.   Musculoskeletal:  Negative for back pain and joint pain.   Skin:  Negative for itching and rash.   Neurological:  Negative for tingling, focal weakness, seizures and headaches.   Psychiatric/Behavioral:  Positive for memory loss. Negative for depression. The patient is not nervous/anxious.      PAST MEDICAL HISTORY:  Past Medical History:   Diagnosis Date    Hyperlipidemia     Hypertension     Invasive lobular carcinoma of breast, stage 2, left 10/3/2022    Urge incontinence     sees Dr. Julien, urology    Vaginal delivery     x3       PAST SURGICAL HISTORY:  Past Surgical History:   Procedure Laterality Date    BREAST BIOPSY Right     Core bx,. benign    CHOLECYSTECTOMY      PARTIAL NEPHRECTOMY         ALLERGIES:   Review of patient's allergies indicates:  No Known Allergies    MEDICATIONS:  Current Outpatient Medications   Medication Sig    aspirin (ECOTRIN) 81 MG EC tablet Take 81 mg by mouth every evening. 1 Tablet, Delayed Release (E.C.) Oral Every day    ipratropium (ATROVENT) 42 mcg (0.06 %) nasal spray USE 2 SPRAYS IN EACH NOSTRIL THREE TIMES DAILY AS NEEDED FOR RHINITIS    levothyroxine (SYNTHROID) 25 MCG tablet     losartan-hydrochlorothiazide 100-25 mg (HYZAAR) 100-25 mg per tablet Take 1 tablet by mouth every morning. 1 Tablet Oral Every day    metoprolol succinate (TOPROL-XL) 25 MG 24 hr tablet     mirabegron (MYRBETRIQ) 25 mg Tb24 ER tablet Take 1 tablet (25 mg total) by mouth once daily.    multivitamin (THERAGRAN) per tablet Take 1 tablet by mouth every morning.     rosuvastatin (CRESTOR) 10 MG tablet Take 10 mg by mouth every evening. 1 Tablet Oral Every day     No current facility-administered medications for this visit.       SOCIAL HISTORY:  Social History      Socioeconomic History    Marital status:    Tobacco Use    Smoking status: Never    Smokeless tobacco: Never   Substance and Sexual Activity    Alcohol use: No    Drug use: No    Sexual activity: Not Currently     Partners: Male   Social History Narrative     x 51 yr.  .  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          FAMILY HISTORY:  Family History   Problem Relation Age of Onset    Heart disease Mother          PHYSICAL EXAMINATION:  Vitals:    10/03/22 1316   BP: (!) 174/72   Pulse: 69   Resp: 18   Temp: 98 °F (36.7 °C)     Body mass index is 25.86 kg/m².    ECO  Physical Exam  Constitutional:       Appearance: Normal appearance.   HENT:      Head: Normocephalic and atraumatic.      Nose: Nose normal.      Mouth/Throat:      Mouth: Mucous membranes are moist.   Cardiovascular:      Rate and Rhythm: Normal rate.   Pulmonary:      Effort: Pulmonary effort is normal.   Chest:          Comments: Palpable 4 cm mass at the upper inner left breast  Abdominal:      General: Abdomen is flat.      Palpations: Abdomen is soft.   Musculoskeletal:         General: Normal range of motion.      Cervical back: Normal range of motion.   Skin:     General: Skin is warm and dry.   Neurological:      General: No focal deficit present.      Mental Status: She is alert. Mental status is at baseline.           ASSESSMENT/PLAN:  Lola was seen today for breast cancer.    Diagnoses and all orders for this visit:    Invasive lobular carcinoma of breast, stage 2, left    The patient has been recommended upfront surgery.  If she proceeds with breast conservation, she would be recommended adjuvant radiation.  The radiation fields and duration of treatment would depend on findings at the time of surgery.  If she opts for mastectomy, then the patient would be recommended PMRT if she has large tumor >5cm or multiple positive nodes.  The patient should return to see me post-operatively to further discuss treatment plan.    The  risks and benefits of treatment have been discussed with the patient and she expressed full understanding. she understands the treatment plan and willing to proceed accordingly.    I spent approximately 60 minutes reviewing the available records and evaluating the patient, out of which over 50% of the time was spent face to face with the patient in counseling and coordinating this patient's care.

## 2022-10-03 NOTE — PROGRESS NOTES
General Surgery Clinic Follow Up Note    Subjective:     Lola Lerner is a 82 y.o. female who presents to clinic for follow up of HR+ left breast mass. Patient was last seen in clinic on 9/26 and she was scheduled for breast MRI. However, patient was unable to tolerate laying flat for the exam. She returns to clinic today to discuss further treatment.    She reports that she is feeling well today. Had appointments with Rad/Onc and Med/Onc earlier today to discuss possible radiation and systemic therapy.    Medications:  Current Outpatient Medications on File Prior to Visit   Medication Sig Dispense Refill    aspirin (ECOTRIN) 81 MG EC tablet Take 81 mg by mouth every evening. 1 Tablet, Delayed Release (E.C.) Oral Every day      ipratropium (ATROVENT) 42 mcg (0.06 %) nasal spray USE 2 SPRAYS IN EACH NOSTRIL THREE TIMES DAILY AS NEEDED FOR RHINITIS 15 mL 3    levothyroxine (SYNTHROID) 25 MCG tablet       losartan-hydrochlorothiazide 100-25 mg (HYZAAR) 100-25 mg per tablet Take 1 tablet by mouth every morning. 1 Tablet Oral Every day      metoprolol succinate (TOPROL-XL) 25 MG 24 hr tablet       mirabegron (MYRBETRIQ) 25 mg Tb24 ER tablet Take 1 tablet (25 mg total) by mouth once daily. 90 tablet 3    multivitamin (THERAGRAN) per tablet Take 1 tablet by mouth every morning.       rosuvastatin (CRESTOR) 10 MG tablet Take 10 mg by mouth every evening. 1 Tablet Oral Every day       No current facility-administered medications on file prior to visit.         Objective:     Physical Exam:  Gen: no apparent distress, awake and alert  CV: regular rate/rhythm  Pulm: non-labored breathing, equal and bilateral chest rise  Abd: soft, non-distended, non-tender  Ext: warm and well perfused, no edema  Skin: warm and dry, no lesions appreciated  Neuro: motor and sensation grossly intact and symmetric     Assessment:     Lola Lerner is a 82 y.o. female with PMH  PMH HTN, HLD, hypothyroidism, and overactive bladder  presenting with left breast mass with invasive lobular carcinoma on biopsy     Plan:     - Will plan for left lumpectomy with SLNB  - Risks and benefits of surgery, including bleeding, infection, seroma, lymphedema, and need for additional procedures discussed with patient  - All questions answered  - Consent signed in clinic    Toro Farris MD   [facility] PGY-1

## 2022-10-03 NOTE — PROGRESS NOTES
Subjective:       Patient ID: Lola Lerner is a 82 y.o. female.    Chief Complaint: Breast Cancer    HPI    Ms. Lola Lerner is an 82 year old female with HTN, hyperlipidemia, RCC s/p partial nephrectomy 2016 who presents for evaluation of invasive lobular carcinoma.     Ms. Lerner noticed a lump in her left breast about one month ago. She has not had any pain, skin changes, or nipple discharge. She does think that her left nipple has changed somewhat (may be more inverted now). She is otherwise feeling well and denies any fevers, chills, weight loss, malaise, n/v/d, or lumps/bumps.     She had screening mammograms up until 2018. She required a biopsy once for a suspicious finding on mammogram but this was ultimately benign.         - 9/14/2022 Diagnostic Mammogram:  Findings:  This procedure was performed using tomosynthesis. Computer-aided detection was utilized in the interpretation of this examination.  The breasts have scattered areas of fibroglandular density.   Mammo Digital Diagnostic Bilat with Jovanny  Left  Developing Asymmetry: There is a developing asymmetry seen in the upper inner quadrant of the left breast. The asymmetry correlates with the palpable mass and skin changes reported by the patient.   Right  There is no evidence of suspicious masses, calcifications, or other abnormal findings in the right breast.  US Breast Left Limited  Left  Mass: There is a 26 mm x 33 mm x 29 mm irregularly shaped, non-parallel, hypoechoic mass with indistinct margins with shadowing seen in the upper inner quadrant of the left breast at 10 o'clock, 2 cm from the nipple. The mass correlates with the palpable mass reported by the patient.    Targeted ultrasound of the left axilla is normal.  Impression:  Left  Mass: Left breast 26 mm x 33 mm x 29 mm mass at the upper inner 10 o'clock position. Assessment: 4A - Suspicious finding. Biopsy is recommended.   Right  There is no mammographic or sonographic evidence  of malignancy in the right breast.  BI-RADS Category:   Overall: 4A - Low Suspicion for Malignancy  Recommendation:  Biopsy is recommended. I discussed these findings and recommendations with the patient and her  in detail at the time of exam.    - 2022 Breast Biopsy:  Breast, left, ultrasound-guided core needle biopsies of mass:   - Invasive lobular carcinoma       - Present in all sampled cores (6 mm in greatest contiguous dimension)       - Aparna-Pal modified SBR score 3 + 2 + 1 = 6 of 9       - Histologic grade 2 of 3       - Mitotic index = 0.1 (average mitoses per high power field) (low)   - Biomarkers, assessed by immunohistochemistry on block 1A       - Estrogen Receptor (ER):  Positive (90%; strong intensity)       - Progesterone Receptor (DE):  Positive (70%; strong intensity)       - HER2:  Equivocal (2+)       - HER2 FISH pending; results to follow in a supplemental report       - Ki-67 proliferation index:  Averages 5-10%   - Lymphovascular invasion is not identified     - 2022 Unable to tolerate MRI      GynHx:   Menarche around age 14 but unsure  Menopause in early 50s, unsure  A0  Very minimal breastfeeding    FH:  No personal history of breast cancer, no family history of breast cancer. No family history of prostate, endometrial, or ovarian cancer.     SH:    Never smoker, non drinker    PMH:  Active Ambulatory Problems     Diagnosis Date Noted    Hypertension 2013    Hyperlipidemia 2013    Recurrent UTI 2016    OAB (overactive bladder) 2016    Malignant neoplasm of right kidney 2016    Invasive lobular carcinoma of breast, stage 2, left 10/03/2022     Resolved Ambulatory Problems     Diagnosis Date Noted    No Resolved Ambulatory Problems     Past Medical History:   Diagnosis Date    Urge incontinence     Vaginal delivery          Review of Systems   Constitutional:  Negative for appetite change and unexpected weight change.    HENT:  Negative for mouth sores.    Eyes:  Negative for visual disturbance.   Respiratory:  Negative for cough and shortness of breath.    Cardiovascular:  Negative for chest pain.   Gastrointestinal:  Negative for abdominal pain and diarrhea.   Genitourinary:  Positive for frequency.   Musculoskeletal:  Negative for back pain.   Integumentary:  Negative for rash.   Neurological:  Negative for headaches.   Hematological:  Negative for adenopathy.   Psychiatric/Behavioral:  The patient is nervous/anxious.        Objective:      Physical Exam  Constitutional:       General: She is not in acute distress.     Appearance: She is well-developed. She is not diaphoretic.   HENT:      Head: Normocephalic and atraumatic.   Eyes:      General: No scleral icterus.     Conjunctiva/sclera: Conjunctivae normal.   Cardiovascular:      Rate and Rhythm: Normal rate and regular rhythm.      Heart sounds: Normal heart sounds. No murmur heard.    No friction rub. No gallop.   Pulmonary:      Effort: Pulmonary effort is normal. No respiratory distress.      Breath sounds: Normal breath sounds. No wheezing or rales.   Chest:   Breasts:     Right: No nipple discharge, skin change or tenderness.      Left: Mass (left breast at 10 o'clock position, about 3cm by palpation. Nontender) present. No nipple discharge, skin change or tenderness.      Comments: Left nipple flat. Small amount of bruising at biopsy site  Abdominal:      General: Bowel sounds are normal. There is no distension.      Palpations: Abdomen is soft.      Tenderness: There is no abdominal tenderness. There is no guarding.   Musculoskeletal:         General: No tenderness. Normal range of motion.      Cervical back: Normal range of motion and neck supple.   Lymphadenopathy:      Upper Body:      Right upper body: No supraclavicular, axillary or pectoral adenopathy.      Left upper body: No supraclavicular, axillary or pectoral adenopathy.   Skin:     General: Skin is warm  and dry.      Findings: No rash.   Neurological:      Mental Status: She is alert and oriented to person, place, and time.   Psychiatric:         Behavior: Behavior normal.       Assessment:       Problem List Items Addressed This Visit          Cardiac/Vascular    Hypertension    Hyperlipidemia       Renal/    OAB (overactive bladder)       Oncology    Malignant neoplasm of right kidney - Primary    Invasive lobular carcinoma of breast, stage 2, left       Plan:       Invasive lobular carcinoma of the left breast  [id number], stage IIA  ER + 90%, MS + 70%, Her2 sammie IHC 2+, FISH pending  Ki67 5-10%    Ms. Lerner has early-stage hormone-receptor positive breast cancer. Her2 status is pending and will need to follow up on result to plan next steps. Discussed her diagnosis and treatment plan in detail with her today in clinic. If Her2 negative, will plan on upfront surgery with oncotype to determine need for adjuvant chemotherapy. If Her2 is positive, will need to discuss possible neoadjuvant chemotherapy.     All questions answered, will follow up with patient when Her2 FISH has resulted    HTN  Hyperlipidemia  Management per PCP    History of RCC s/p partial nephrectomy  Overactive bladder  Follows with urology    Discussed with Dr. Anish Knight, DO  PGY-V  Hematology/Oncology Fellow      Attending Note:  I have personally taken the history and examined this patient and agree with the fellow's note as stated above.  In depth discussion regarding tumor characteristics  Awaiting Nwd2xph FISH  Reviewed can go to surgery up front either way - if [id number] +we could discuss Taxol Herceptin post as desires minimal chemotherapy  We did review NA if [id number] + as well  If run1scb negative can have additional info to support if chemo needed by oncotype    Route Chart for Scheduling    Med Onc Chart Routing      Follow up with physician . Follow up pending final path- will resend   Follow up with FABIOLA    Infusion  scheduling note    Injection scheduling note    Labs    Imaging    Pharmacy appointment    Other referrals

## 2022-10-04 DIAGNOSIS — C50.919 INVASIVE LOBULAR CARCINOMA OF BREAST IN FEMALE: Primary | ICD-10-CM

## 2022-10-05 ENCOUNTER — HOSPITAL ENCOUNTER (OUTPATIENT)
Dept: RADIOLOGY | Facility: HOSPITAL | Age: 82
Discharge: HOME | End: 2022-10-05
Attending: SURGERY
Payer: MEDICARE

## 2022-10-05 DIAGNOSIS — R92.8 ABNORMAL MAMMOGRAM: ICD-10-CM

## 2022-10-05 DIAGNOSIS — C50.912 INVASIVE LOBULAR CARCINOMA OF LEFT BREAST IN FEMALE: ICD-10-CM

## 2022-10-05 LAB
COMMENT: NORMAL
FINAL PATHOLOGIC DIAGNOSIS: NORMAL
GROSS: NORMAL
Lab: NORMAL
MICROSCOPIC EXAM: NORMAL
SUPPLEMENTAL DIAGNOSIS: NORMAL

## 2022-10-05 PROCEDURE — 19285 PERQ DEV BREAST 1ST US IMAG: CPT | Mod: LT,,, | Performed by: RADIOLOGY

## 2022-10-05 PROCEDURE — A4648 IMPLANTABLE TISSUE MARKER: HCPCS

## 2022-10-05 PROCEDURE — 25000003 PHARM REV CODE 250: Performed by: SURGERY

## 2022-10-05 PROCEDURE — 77065 MAMMO DIGITAL DIAGNOSTIC LEFT: ICD-10-PCS | Mod: 26,LT,, | Performed by: RADIOLOGY

## 2022-10-05 PROCEDURE — 19285 US BREAST RADAR REFLECTOR LOCALIZATION W/GUIDANCE, 1ST LESION, LEFT: ICD-10-PCS | Mod: LT,,, | Performed by: RADIOLOGY

## 2022-10-05 PROCEDURE — 77065 DX MAMMO INCL CAD UNI: CPT | Mod: TC,LT

## 2022-10-05 PROCEDURE — 77065 DX MAMMO INCL CAD UNI: CPT | Mod: 26,LT,, | Performed by: RADIOLOGY

## 2022-10-05 RX ORDER — LIDOCAINE HYDROCHLORIDE 20 MG/ML
10 INJECTION, SOLUTION INFILTRATION; PERINEURAL ONCE
Status: COMPLETED | OUTPATIENT
Start: 2022-10-05 | End: 2022-10-05

## 2022-10-05 RX ADMIN — LIDOCAINE HYDROCHLORIDE 10 ML: 20 INJECTION, SOLUTION INFILTRATION; PERINEURAL at 10:10

## 2022-10-06 ENCOUNTER — TELEPHONE (OUTPATIENT)
Dept: SURGERY | Facility: CLINIC | Age: 82
End: 2022-10-06
Payer: MEDICARE

## 2022-10-06 NOTE — TELEPHONE ENCOUNTER
----- Message from Johnnie Spencer MD sent at 10/6/2022  4:39 PM CDT -----  Deirdre can you let pt know that fish is negative, plan is as we discussed/surgery first      ----- Message -----  From: Esteban Grace MD  Sent: 10/5/2022  11:50 AM CDT  To: Johnnie Spencer MD    I just released the report. HER2 FISH Negative. It should be visible in Epic soon.    Gianluca Douglas  ----- Message -----  From: Johnnie Spencer MD  Sent: 10/5/2022  11:03 AM CDT  To: Esteban Grace MD    Thanks    ----- Message -----  From: Esteban Grace MD  Sent: 10/4/2022   5:26 PM CDT  To: Johnnie Spencer MD    Frazier got back to me and are telling me the FISH should be completed by Thursday or Friday. I'll be sure to let you know as soon as I hear anything else. If you had any other questions, please feel free to reach out. My office number is [phone] and my cell phone is [phone].    Gianluca Douglas    ----- Message -----  From: Johnnie Spencer MD  Sent: 10/3/2022   3:26 PM CDT  To: Esteban Grace MD    Hi, we are looking for the FISH results for this patient.  Any word?

## 2022-10-07 ENCOUNTER — TELEPHONE (OUTPATIENT)
Dept: HEMATOLOGY/ONCOLOGY | Facility: CLINIC | Age: 82
End: 2022-10-07
Payer: MEDICARE

## 2022-10-07 NOTE — TELEPHONE ENCOUNTER
Message -----   From: Janel Oconnell MD   Sent: 10/6/2022   6:19 PM CDT   To: Johnnie Spencer MD, *     I called her and went over in depth   She asked me to tell her  as well - done   Knows surgery up front since [id number] negative and as such upfront systemic treatment needed   She knows we will use final path to determine next steps   She is very reassured

## 2022-10-16 DIAGNOSIS — C50.919 INVASIVE LOBULAR CARCINOMA OF BREAST IN FEMALE: Primary | ICD-10-CM

## 2022-10-16 RX ORDER — SODIUM CHLORIDE, SODIUM LACTATE, POTASSIUM CHLORIDE, CALCIUM CHLORIDE 600; 310; 30; 20 MG/100ML; MG/100ML; MG/100ML; MG/100ML
INJECTION, SOLUTION INTRAVENOUS CONTINUOUS
Status: CANCELLED | OUTPATIENT
Start: 2022-10-16

## 2022-10-16 RX ORDER — CEFAZOLIN SODIUM 2 G/50ML
2 SOLUTION INTRAVENOUS
Status: CANCELLED | OUTPATIENT
Start: 2022-10-16

## 2022-10-18 ENCOUNTER — ANESTHESIA EVENT (OUTPATIENT)
Dept: SURGERY | Facility: HOSPITAL | Age: 82
End: 2022-10-18
Payer: MEDICARE

## 2022-10-18 ENCOUNTER — TELEPHONE (OUTPATIENT)
Dept: SURGERY | Facility: CLINIC | Age: 82
End: 2022-10-18
Payer: MEDICARE

## 2022-10-19 ENCOUNTER — HOSPITAL ENCOUNTER (OUTPATIENT)
Facility: HOSPITAL | Age: 82
Discharge: HOME | End: 2022-10-19
Attending: SURGERY | Admitting: SURGERY
Payer: MEDICARE

## 2022-10-19 ENCOUNTER — ANESTHESIA (OUTPATIENT)
Dept: SURGERY | Facility: HOSPITAL | Age: 82
End: 2022-10-19
Payer: MEDICARE

## 2022-10-19 ENCOUNTER — HOSPITAL ENCOUNTER (OUTPATIENT)
Dept: RADIOLOGY | Facility: HOSPITAL | Age: 82
Discharge: HOME | End: 2022-10-19
Attending: SURGERY
Payer: MEDICARE

## 2022-10-19 ENCOUNTER — HOSPITAL ENCOUNTER (OUTPATIENT)
Dept: RADIOLOGY | Facility: HOSPITAL | Age: 82
Discharge: HOME | End: 2022-10-19
Attending: SURGERY | Admitting: SURGERY
Payer: MEDICARE

## 2022-10-19 VITALS
BODY MASS INDEX: 24.11 KG/M2 | TEMPERATURE: 97 F | WEIGHT: 150 LBS | DIASTOLIC BLOOD PRESSURE: 66 MMHG | OXYGEN SATURATION: 60 % | HEIGHT: 66 IN | RESPIRATION RATE: 20 BRPM | SYSTOLIC BLOOD PRESSURE: 133 MMHG | HEART RATE: 56 BPM

## 2022-10-19 DIAGNOSIS — C50.919 INVASIVE LOBULAR CARCINOMA OF BREAST IN FEMALE: ICD-10-CM

## 2022-10-19 DIAGNOSIS — C50.912 INVASIVE LOBULAR CARCINOMA OF LEFT BREAST IN FEMALE: ICD-10-CM

## 2022-10-19 LAB — POCT GLUCOSE: 109 MG/DL (ref 70–110)

## 2022-10-19 PROCEDURE — 76098 MAMMO BREAST SPECIMEN: ICD-10-PCS | Mod: 26,,, | Performed by: RADIOLOGY

## 2022-10-19 PROCEDURE — 38525 BIOPSY/REMOVAL LYMPH NODES: CPT | Mod: 51,LT,GC, | Performed by: SURGERY

## 2022-10-19 PROCEDURE — 88307 TISSUE EXAM BY PATHOLOGIST: CPT | Mod: 26,,, | Performed by: PATHOLOGY

## 2022-10-19 PROCEDURE — 88360 TUMOR IMMUNOHISTOCHEM/MANUAL: CPT | Performed by: PATHOLOGY

## 2022-10-19 PROCEDURE — 88342 IMHCHEM/IMCYTCHM 1ST ANTB: CPT | Mod: 59 | Performed by: PATHOLOGY

## 2022-10-19 PROCEDURE — 82962 GLUCOSE BLOOD TEST: CPT | Performed by: SURGERY

## 2022-10-19 PROCEDURE — 37000008 HC ANESTHESIA 1ST 15 MINUTES: Performed by: SURGERY

## 2022-10-19 PROCEDURE — 36000707: Performed by: SURGERY

## 2022-10-19 PROCEDURE — 88360 PR  TUMOR IMMUNOHISTOCHEM/MANUAL: ICD-10-PCS | Mod: 26,,, | Performed by: PATHOLOGY

## 2022-10-19 PROCEDURE — 88307 TISSUE EXAM BY PATHOLOGIST: CPT | Performed by: PATHOLOGY

## 2022-10-19 PROCEDURE — A9520 TC99 TILMANOCEPT DIAG 0.5MCI: HCPCS

## 2022-10-19 PROCEDURE — 71000016 HC POSTOP RECOV ADDL HR: Performed by: SURGERY

## 2022-10-19 PROCEDURE — 88360 TUMOR IMMUNOHISTOCHEM/MANUAL: CPT | Mod: 26,,, | Performed by: PATHOLOGY

## 2022-10-19 PROCEDURE — 76098 X-RAY EXAM SURGICAL SPECIMEN: CPT | Mod: 26,,, | Performed by: RADIOLOGY

## 2022-10-19 PROCEDURE — 71000044 HC DOSC ROUTINE RECOVERY FIRST HOUR: Performed by: SURGERY

## 2022-10-19 PROCEDURE — 19301 PR MASTECTOMY, PARTIAL: ICD-10-PCS | Mod: LT,GC,, | Performed by: SURGERY

## 2022-10-19 PROCEDURE — 36000706: Performed by: SURGERY

## 2022-10-19 PROCEDURE — 71000015 HC POSTOP RECOV 1ST HR: Performed by: SURGERY

## 2022-10-19 PROCEDURE — 38900 PR INTRAOPERATIVE SENTINEL LYMPH NODE ID W DYE INJECTION: ICD-10-PCS | Mod: GC,,, | Performed by: SURGERY

## 2022-10-19 PROCEDURE — D9220A PRA ANESTHESIA: Mod: ,,, | Performed by: ANESTHESIOLOGY

## 2022-10-19 PROCEDURE — 88342 CHG IMMUNOCYTOCHEMISTRY: ICD-10-PCS | Mod: 26,XU,, | Performed by: PATHOLOGY

## 2022-10-19 PROCEDURE — 63600175 PHARM REV CODE 636 W HCPCS: Performed by: ANESTHESIOLOGY

## 2022-10-19 PROCEDURE — 38900 IO MAP OF SENT LYMPH NODE: CPT | Mod: GC,,, | Performed by: SURGERY

## 2022-10-19 PROCEDURE — 38525 PR BIOPSY/REM LYMPH NODES, AXILLARY: ICD-10-PCS | Mod: 51,LT,GC, | Performed by: SURGERY

## 2022-10-19 PROCEDURE — 25000003 PHARM REV CODE 250

## 2022-10-19 PROCEDURE — 88341 PR IHC OR ICC EACH ADD'L SINGLE ANTIBODY  STAINPR: ICD-10-PCS | Mod: 26,59,, | Performed by: PATHOLOGY

## 2022-10-19 PROCEDURE — 88341 IMHCHEM/IMCYTCHM EA ADD ANTB: CPT | Mod: 26,59,, | Performed by: PATHOLOGY

## 2022-10-19 PROCEDURE — 88342 IMHCHEM/IMCYTCHM 1ST ANTB: CPT | Mod: 26,XU,, | Performed by: PATHOLOGY

## 2022-10-19 PROCEDURE — 19301 PARTIAL MASTECTOMY: CPT | Mod: LT,GC,, | Performed by: SURGERY

## 2022-10-19 PROCEDURE — 88307 PR  SURG PATH,LEVEL V: ICD-10-PCS | Mod: 26,,, | Performed by: PATHOLOGY

## 2022-10-19 PROCEDURE — 25000003 PHARM REV CODE 250: Performed by: ANESTHESIOLOGY

## 2022-10-19 PROCEDURE — 27201423 OPTIME MED/SURG SUP & DEVICES STERILE SUPPLY: Performed by: SURGERY

## 2022-10-19 PROCEDURE — D9220A PRA ANESTHESIA: ICD-10-PCS | Mod: ,,, | Performed by: ANESTHESIOLOGY

## 2022-10-19 PROCEDURE — 76098 X-RAY EXAM SURGICAL SPECIMEN: CPT | Mod: TC

## 2022-10-19 PROCEDURE — 88341 IMHCHEM/IMCYTCHM EA ADD ANTB: CPT | Mod: 59 | Performed by: PATHOLOGY

## 2022-10-19 PROCEDURE — 37000009 HC ANESTHESIA EA ADD 15 MINS: Performed by: SURGERY

## 2022-10-19 RX ORDER — OXYCODONE HYDROCHLORIDE 5 MG/1
5 TABLET ORAL EVERY 4 HOURS PRN
Qty: 10 TABLET | Refills: 0 | Status: SHIPPED | OUTPATIENT
Start: 2022-10-19 | End: 2022-12-01

## 2022-10-19 RX ORDER — ONDANSETRON 2 MG/ML
INJECTION INTRAMUSCULAR; INTRAVENOUS
Status: DISCONTINUED | OUTPATIENT
Start: 2022-10-19 | End: 2022-10-19

## 2022-10-19 RX ORDER — HALOPERIDOL 5 MG/ML
0.5 INJECTION INTRAMUSCULAR EVERY 10 MIN PRN
Status: DISCONTINUED | OUTPATIENT
Start: 2022-10-19 | End: 2022-10-19 | Stop reason: HOSPADM

## 2022-10-19 RX ORDER — KETOROLAC TROMETHAMINE 30 MG/ML
15 INJECTION, SOLUTION INTRAMUSCULAR; INTRAVENOUS EVERY 8 HOURS PRN
Status: DISCONTINUED | OUTPATIENT
Start: 2022-10-19 | End: 2022-10-19 | Stop reason: HOSPADM

## 2022-10-19 RX ORDER — OXYCODONE AND ACETAMINOPHEN 5; 325 MG/1; MG/1
1 TABLET ORAL
Status: DISCONTINUED | OUTPATIENT
Start: 2022-10-19 | End: 2022-10-19 | Stop reason: HOSPADM

## 2022-10-19 RX ORDER — LIDOCAINE HYDROCHLORIDE 20 MG/ML
INJECTION, SOLUTION EPIDURAL; INFILTRATION; INTRACAUDAL; PERINEURAL
Status: DISCONTINUED | OUTPATIENT
Start: 2022-10-19 | End: 2022-10-19

## 2022-10-19 RX ORDER — SODIUM CHLORIDE, SODIUM LACTATE, POTASSIUM CHLORIDE, CALCIUM CHLORIDE 600; 310; 30; 20 MG/100ML; MG/100ML; MG/100ML; MG/100ML
INJECTION, SOLUTION INTRAVENOUS CONTINUOUS
Status: DISCONTINUED | OUTPATIENT
Start: 2022-10-19 | End: 2022-10-19 | Stop reason: HOSPADM

## 2022-10-19 RX ORDER — KETOROLAC TROMETHAMINE 30 MG/ML
INJECTION, SOLUTION INTRAMUSCULAR; INTRAVENOUS
Status: DISCONTINUED | OUTPATIENT
Start: 2022-10-19 | End: 2022-10-19

## 2022-10-19 RX ORDER — NEOSTIGMINE METHYLSULFATE 0.5 MG/ML
INJECTION, SOLUTION INTRAVENOUS
Status: DISCONTINUED | OUTPATIENT
Start: 2022-10-19 | End: 2022-10-19

## 2022-10-19 RX ORDER — CEFAZOLIN SODIUM/WATER 2 G/20 ML
2 SYRINGE (ML) INTRAVENOUS
Status: DISCONTINUED | OUTPATIENT
Start: 2022-10-19 | End: 2022-10-19 | Stop reason: HOSPADM

## 2022-10-19 RX ORDER — DEXAMETHASONE SODIUM PHOSPHATE 4 MG/ML
INJECTION, SOLUTION INTRA-ARTICULAR; INTRALESIONAL; INTRAMUSCULAR; INTRAVENOUS; SOFT TISSUE
Status: DISCONTINUED | OUTPATIENT
Start: 2022-10-19 | End: 2022-10-19

## 2022-10-19 RX ORDER — SODIUM CHLORIDE 9 MG/ML
INJECTION, SOLUTION INTRAVENOUS CONTINUOUS PRN
Status: DISCONTINUED | OUTPATIENT
Start: 2022-10-19 | End: 2022-10-19

## 2022-10-19 RX ORDER — HYDROMORPHONE HYDROCHLORIDE 1 MG/ML
0.2 INJECTION, SOLUTION INTRAMUSCULAR; INTRAVENOUS; SUBCUTANEOUS EVERY 10 MIN PRN
Status: DISCONTINUED | OUTPATIENT
Start: 2022-10-19 | End: 2022-10-19 | Stop reason: HOSPADM

## 2022-10-19 RX ORDER — PROPOFOL 10 MG/ML
VIAL (ML) INTRAVENOUS
Status: DISCONTINUED | OUTPATIENT
Start: 2022-10-19 | End: 2022-10-19

## 2022-10-19 RX ORDER — KETAMINE HCL IN 0.9 % NACL 50 MG/5 ML
SYRINGE (ML) INTRAVENOUS
Status: DISCONTINUED | OUTPATIENT
Start: 2022-10-19 | End: 2022-10-19

## 2022-10-19 RX ORDER — ROCURONIUM BROMIDE 10 MG/ML
INJECTION, SOLUTION INTRAVENOUS
Status: DISCONTINUED | OUTPATIENT
Start: 2022-10-19 | End: 2022-10-19

## 2022-10-19 RX ADMIN — GLYCOPYRROLATE 0.4 MG: 0.2 INJECTION INTRAMUSCULAR; INTRAVENOUS at 10:10

## 2022-10-19 RX ADMIN — KETOROLAC TROMETHAMINE 15 MG: 30 INJECTION, SOLUTION INTRAMUSCULAR; INTRAVENOUS at 09:10

## 2022-10-19 RX ADMIN — NEOSTIGMINE METHYLSULFATE 5 MG: 0.5 INJECTION, SOLUTION INTRAVENOUS at 10:10

## 2022-10-19 RX ADMIN — LIDOCAINE HYDROCHLORIDE 100 MG: 20 INJECTION, SOLUTION EPIDURAL; INFILTRATION; INTRACAUDAL; PERINEURAL at 08:10

## 2022-10-19 RX ADMIN — Medication 25 MG: at 08:10

## 2022-10-19 RX ADMIN — DEXAMETHASONE SODIUM PHOSPHATE 8 MG: 4 INJECTION INTRA-ARTICULAR; INTRALESIONAL; INTRAMUSCULAR; INTRAVENOUS; SOFT TISSUE at 08:10

## 2022-10-19 RX ADMIN — Medication 2 G: at 08:10

## 2022-10-19 RX ADMIN — SODIUM CHLORIDE: 0.9 INJECTION, SOLUTION INTRAVENOUS at 08:10

## 2022-10-19 RX ADMIN — ONDANSETRON 4 MG: 2 INJECTION INTRAMUSCULAR; INTRAVENOUS at 08:10

## 2022-10-19 RX ADMIN — PROPOFOL 150 MG: 10 INJECTION, EMULSION INTRAVENOUS at 08:10

## 2022-10-19 RX ADMIN — ROCURONIUM BROMIDE 30 MG: 50 INJECTION INTRAVENOUS at 08:10

## 2022-10-19 NOTE — PATIENT INSTRUCTIONS
Postoperative General Instructions    What to Expect:  It is normal to experience pain and swelling at the surgical site.  The pain usually decreases significantly after the first week but may last for many weeks.  Each day, the pain should be similar or better to the previous day. If it worsens, call the doctor's office.     Activity:  You should walk beginning on the day of surgery and increase activity slowly over the next two to four weeks.  Do not drive while taking prescription pain medication.  Do not lift anything heavier than 10 lbs with your left arm for 4 weeks     Wound Care:  You may shower. Let soap and water run over the incisions and pat dry. Do not submerge in a bathtub or pool.  You can remove the bra in 72 hours.     Diet:  You may resume your normal diet postoperatively.  Take a stool softener or laxative to avoid constipation.     Medication:  Pain Control  Take acetaminophen (Tylenol) 650 mg 4 times daily as needed for pain.  Take ibuprofen 600 mg 3 times daily as needed for pain. Stop taking this medication if it causes an upset stomach.  Take the prescribed oxycodone as needed if you have pain that is not controlled by acetaminophen and ibuprofen.  Bowel Regularity  Take an over-the-counter stool softener/laxative (Colace, Miralax, or Milk of Magnesia) to avoid constipation.  Previous Home Medication  Restart your previous home medication unless otherwise instructed.    Call Your Doctor's Office If You Experience:  Fevers greater than 101.3°F.  Vomiting.  Spreading redness or drainage from the incisions.  Opening of the incisions.  Worsening pain not controlled by medication.  Chest pain or shortness of breath.    Follow-Up:  Follow-up with your surgeon as scheduled in 2 weeks.  If no follow-up appointment has been scheduled, call to schedule an appointment within 1-2 weeks of discharge.     Contact Information:  During normal business hours ramiro the clinic with any questions or concerns. On  weekends and evenings call [phone] to have the operators page the surgery resident on call after hours with questions or concerns.

## 2022-10-19 NOTE — ANESTHESIA PREPROCEDURE EVALUATION
[facility]  Anesthesia Pre-Operative Evaluation       Patient Name: Lola Lerner  YOB: 1940  MRN: [id number]  [facility]: [id number]      Code Status: Prior   Date of Procedure: 10/19/2022  Anesthesia: General Procedure: Procedure(s) (LRB):  LUMPECTOMY,BREAST,WITH RADIOLOGIC MARKER LOCALIZATION AND SENTINEL LYMPH NODE BIOPSY (Left)  Pre-Operative Diagnosis: Invasive lobular carcinoma of breast in female [C50.919]  Proceduralist: Surgeon(s) and Role:     * Johnnie Spencer MD - Primary        SUBJECTIVE:   Lola Lerner is a 82 y.o. female who  has a past medical history of Cancer of kidney, Hyperlipidemia, Hypertension, Invasive lobular carcinoma of breast, stage 2, left (10/03/2022), Urge incontinence, and Vaginal delivery. No notes on file    Anticoagulants   Medication Route Frequency       she has a current medication list which includes the following long-term medication(s): losartan-hydrochlorothiazide 100-25 mg, metoprolol succinate, myrbetriq, and rosuvastatin.   ALLERGIES:   Review of patient's allergies indicates:  No Known Allergies  LDA:      Lines/Drains/Airways     Peripheral Intravenous Line  Duration                Peripheral IV - Single Lumen 10/19/22 0716 20 G Forearm <1 day              MEDICATIONS:     Antibiotics (From admission, onward)    Start     Stop Route Frequency Ordered    10/19/22 0629  ceFAZolin in sterile water 2 gram/20 mL IV syringe 2,000 mg         -- IV On Call Procedure 10/19/22 0629        VTE Risk Mitigation (From admission, onward)         Ordered     Place sequential compression device  Until discontinued         10/19/22 0629     Place DEMIAN hose  Until discontinued         10/19/22 0629              Current Facility-Administered Medications   Medication Dose Route Frequency Provider Last Rate Last Admin    ceFAZolin in sterile water 2 gram/20 mL IV syringe 2,000 mg  2 g Intravenous On Call Procedure HORACIO Lopez  ringers infusion   Intravenous Continuous Sheri Davison NP              History:   There are no hospital problems to display for this patient.    Surgical History:    has a past surgical history that includes Cholecystectomy; Partial nephrectomy; and Breast biopsy (Right).   Social History:    reports that she is not currently sexually active and has had partner(s) who are male.  reports that she has never smoked. She has never used smokeless tobacco. She reports that she does not drink alcohol and does not use drugs.     OBJECTIVE:     Vital Signs (Most Recent):  Temp: 36.4 °C (97.5 °F) (10/19/22 0652)  Pulse: 70 (10/19/22 0652)  Resp: 16 (10/19/22 0652)  BP: (!) 181/75 (10/19/22 0652)  SpO2: 100 % (10/19/22 0652) Vital Signs Range (Last 24H):  Temp:  [36.4 °C (97.5 °F)]   Pulse:  [70]   Resp:  [16]   BP: (181)/(75)   SpO2:  [100 %]        Body mass index is 24.21 kg/m².   Wt Readings from Last 4 Encounters:   10/19/22 68 kg (150 lb)   10/03/22 74.9 kg (165 lb 2 oz)   10/03/22 74.9 kg (165 lb 2 oz)   09/26/22 73.9 kg (163 lb)     Significant Labs:  Lab Results   Component Value Date    WBC 8.94 09/07/2022    HGB 15.0 09/07/2022    HCT 45.3 09/07/2022     09/07/2022     09/07/2022    K 4.0 09/07/2022     09/07/2022    CREATININE 0.7 09/29/2022    BUN 14 09/07/2022    CO2 26 09/07/2022     09/07/2022    CALCIUM 9.4 09/07/2022    ALKPHOS 66 09/07/2022    ALT 18 09/07/2022    AST 22 09/07/2022    ALBUMIN 4.2 09/07/2022    INR 1.0 08/11/2016     06/16/2011     No LMP recorded. Patient is postmenopausal.  No results found for this or any previous visit (from the past 72 hour(s)).    EKG:   Results for orders placed or performed during the hospital encounter of 08/11/16   EKG 12-lead    Collection Time: 08/11/16 11:21 AM    Narrative    Test Reason : C64.9  Blood Pressure : * mmHG  Vent. Rate : 068 BPM     Atrial Rate : 068 BPM     P-R Int : 282 ms          QRS Dur : 100 ms      QT Int :  402 ms       P-R-T Axes : 038 025 055 degrees     QTc Int : 427 ms    Sinus rhythm with 1st degree A-V block  Otherwise normal ECG  No previous ECGs available  Confirmed by Kenyon Veliz MD ([id number]) on 8/12/2016 5:11:03 PM    Referred By: FRANCESCO GRAY           Confirmed By:Kenyon Veliz MD       TTE:  No results found for this or any previous visit.  No results found for: EF   No results found for this or any previous visit.  SIMÓN:  No results found for this or any previous visit.  Stress Test:  No results found for this or any previous visit.     LHC:  No results found for this or any previous visit.     PFT:  No results found for: FEV1, FVC, AMT0KUU, TLC, DLCO   ASSESSMENT/PLAN:         Pre-op Assessment    I have reviewed the Patient Summary Reports.     I have reviewed the Nursing Notes.    I have reviewed the Medications.     Review of Systems  Anesthesia Hx:  No problems with previous Anesthesia    Hematology/Oncology:  Hematology Normal   Oncology Normal     EENT/Dental:EENT/Dental Normal   Cardiovascular:   Exercise tolerance: good Hypertension    Pulmonary:  Pulmonary Normal    Renal/:   Chronic Renal Disease    Hepatic/GI:  Hepatic/GI Normal    Musculoskeletal:  Musculoskeletal Normal    Neurological:  Neurology Normal    Endocrine:  Endocrine Normal    Dermatological:  Skin Normal    Psych:  Psychiatric Normal           Physical Exam  General: Well nourished    Airway:  Mallampati: III / II  Mouth Opening: Normal  TM Distance: Normal  Tongue: Normal  Neck ROM: Normal ROM    Dental:  Intact        Anesthesia Plan  Type of Anesthesia, risks & benefits discussed:    Anesthesia Type: Gen ETT  Intra-op Monitoring Plan: Standard ASA Monitors  Post Op Pain Control Plan: multimodal analgesia and IV/PO Opioids PRN  Induction:  IV  Airway Plan: Direct and Video, Post-Induction  Informed Consent: Informed consent signed with the Patient and all parties understand the risks and agree with anesthesia plan.  All  questions answered.   ASA Score: 2  Day of Surgery Review of History & Physical: H&P completed by Anesthesiologist.  Anesthesia Plan Notes: Chart reviewed, patient interviewed and examined.  The anesthetic plan was explained.  Risks, benefits, and alternatives were discussed. Questions were answered and the consent was signed.        MESSI Burnett M.D.         Ready For Surgery From Anesthesia Perspective.     .

## 2022-10-19 NOTE — INTERVAL H&P NOTE
Day of surgery addendum     Current history and physical on file and completed as below.  There have been no significant clinical changes since the completion of the H&P below.  Patient identified by me and surgical site confirmed by patient and myself.    I have personally discussed the risks of surgery as detailed below, as well as the risks of anesthesia.  Lola Lerner understands the risks, any and all questions were answered to her satisfaction.     Completed by:  Johnnie Spencer MD  on 10/19/2022 at 7:57 AM

## 2022-10-19 NOTE — PROGRESS NOTES
Informed Dr. Burnett pts is confused to time this morning,  will sign consent. Informed surgery resident, CHUCK normal pt states she is forgetful, Dr. Spencer aware needs family to sign consent.

## 2022-10-19 NOTE — ANESTHESIA PROCEDURE NOTES
Intubation    Date/Time: 10/19/2022 8:25 AM  Performed by: Azalia Burnett MD  Authorized by: Azalia Burnett MD     Intubation:     Induction:  Intravenous    Intubated:  Postinduction    Mask Ventilation:  Easy mask    Attempts:  1    Attempted By:  Staff anesthesiologist    Method of Intubation:  Direct    Blade:  Hernandez 2    Laryngeal View Grade: Grade IIA - cords partially seen      Difficult Airway Encountered?: No      Complications:  None    Airway Device:  Oral endotracheal tube    Airway Device Size:  7.5    Style/Cuff Inflation:  Cuffed    Inflation Amount (mL):  7    Tube secured:  21    Secured at:  The teeth    Placement Verified By:  Capnometry    Complicating Factors:  None    Findings Post-Intubation:  BS equal bilateral and atraumatic/condition of teeth unchanged

## 2022-10-19 NOTE — PLAN OF CARE
Discharge instructions reviewed with patient, spouse, and daughters at bedside. Verbalized understanding. Packet given. Medication to be delivered to bedside prior to patient discharge.

## 2022-10-19 NOTE — BRIEF OP NOTE
Gianluca Bowles - Surgery ([facility])  Brief Operative Note    Surgery Date: 10/19/2022     Surgeon(s) and Role:     * Michelle Spencer MD - Primary     * Toro Farris MD - Resident - Assisting    Pre-op Diagnosis:  Invasive lobular carcinoma of breast in female [C50.919]    Post-op Diagnosis:  Post-Op Diagnosis Codes:     * Invasive lobular carcinoma of breast in female [C50.919]    Procedure(s) (LRB):  LUMPECTOMY,BREAST,WITH RADIOLOGIC MARKER LOCALIZATION AND SENTINEL LYMPH NODE BIOPSY (Left)    Anesthesia: General    Operative Findings: left partial mastectomy, shave margins taken, left axillary sentinel lymph node biopsy with one hot node removed, no dye used    Estimated Blood Loss: * No values recorded between 10/19/2022  8:45 AM and 10/19/2022 10:17 AM *         Specimens:   Specimen (24h ago, onward)       Start     Ordered    10/19/22 0914  Specimen to Pathology, Surgery General Surgery  Once        Comments: Pre-op Diagnosis: Invasive lobular carcinoma of breast in female [C50.919]Procedure(s):LUMPECTOMY,BREAST,WITH RADIOLOGIC MARKER LOCALIZATION AND SENTINEL LYMPH NODE BIOPSY Number of specimens: 7Name of specimens: 1- Left Breast Partial Mastectomy- Inferior-green, Superior-blue, Lateral-orange, Anterior-yellow, Posterior-black, Medial-red2- Left Breast Superior Shave Margin- Ink Marks Margin3- Left Breast Inferior Shave Margin- Ink Marks Margin4- Left Breast Medial Shave Margin- Ink Marks Margin5- Left Breast Lateral Shave Margin- Ink Marks Margin6- Left Breast Posterior Shave Margin- Ink Marks Margin7- Ethridge Node #1 Left Axilla 5000     References:    Click here for ordering Quick Tip   Question Answer Comment   Procedure Type: General Surgery    Specimen Class: Known or suspected malignancy    Which provider would you like to cc? MICHELLE SPENCER    Release to patient Immediate        10/19/22 1012                      Discharge Note    OUTCOME: Patient tolerated treatment/procedure well  without complication and is now ready for discharge.    DISPOSITION: Home or Self Care    FINAL DIAGNOSIS:  <principal problem not specified>    FOLLOWUP: In clinic    DISCHARGE INSTRUCTIONS:    Discharge Procedure Orders   Diet Adult Regular     Notify your health care provider if you experience any of the following:  temperature >100.4     Notify your health care provider if you experience any of the following:  persistent nausea and vomiting or diarrhea     Notify your health care provider if you experience any of the following:  severe uncontrolled pain     Notify your health care provider if you experience any of the following:  redness, tenderness, or signs of infection (pain, swelling, redness, odor or green/yellow discharge around incision site)     Notify your health care provider if you experience any of the following:  difficulty breathing or increased cough      Remove dressing in 72 hours

## 2022-10-19 NOTE — TRANSFER OF CARE
"Anesthesia Transfer of Care Note    Patient: Lola Lerner    Procedure(s) Performed: Procedure(s) (LRB):  LUMPECTOMY,BREAST,WITH RADIOLOGIC MARKER LOCALIZATION AND SENTINEL LYMPH NODE BIOPSY (Left)    Patient location: [facility]    Anesthesia Type: general    Transport from OR: Transported from OR on 6-10 L/min O2 by face mask with adequate spontaneous ventilation    Post pain: adequate analgesia    Post assessment: no apparent anesthetic complications    Post vital signs: stable    Level of consciousness: awake and oriented    Nausea/Vomiting: no nausea/vomiting    Complications: none    Transfer of care protocol was followed      Last vitals:   Visit Vitals  BP (!) 149/69 (BP Location: Right arm, Patient Position: Lying)   Pulse 79   Temp 36.5 °C (97.7 °F) (Temporal)   Resp 16   Ht 5' 6" (1.676 m)   Wt 68 kg (150 lb)   SpO2 100%   Breastfeeding No   BMI 24.21 kg/m²     "

## 2022-10-21 NOTE — ANESTHESIA POSTPROCEDURE EVALUATION
Anesthesia Post Evaluation    Patient: Lola Lerner    Procedure(s) Performed: Procedure(s) (LRB):  LUMPECTOMY,BREAST,WITH RADIOLOGIC MARKER LOCALIZATION AND SENTINEL LYMPH NODE BIOPSY (Left)    Final Anesthesia Type: general      Patient location during evaluation: PACU  Patient participation: Yes- Able to Participate  Level of consciousness: awake and alert  Post-procedure vital signs: reviewed and stable  Pain management: adequate  Airway patency: patent    PONV status at discharge: No PONV  Anesthetic complications: no      Cardiovascular status: blood pressure returned to baseline  Respiratory status: unassisted  Hydration status: euvolemic  Follow-up not needed.          Vitals Value Taken Time   /66 10/19/22 1230   Temp 36.3 °C (97.3 °F) 10/19/22 1230   Pulse 58 10/19/22 1238   Resp 30 10/19/22 1237   SpO2 95 % 10/19/22 1238   Vitals shown include unvalidated device data.      No case tracking events are documented in the log.      Pain/Collette Score: No data recorded

## 2022-10-22 NOTE — OP NOTE
Gianluca yu - Surgery ([facility])  Surgery Department  Operative Note       Date of Procedure: 10/19/2022       Surgeon(s):  Surgeon(s) and Role:     * Johnnie Spencer MD - Primary     * Toro Farris MD - Resident - Assisting      Pre-Operative Diagnosis:   Invasive lobular carcinoma of breast in female [C50.919], left    Post-Operative Diagnosis:   same     Anesthesia: General    Operative Findings:   Target confirmed by in room specimen radiography    Procedures:  Left Breast Partial Mastectomy  Left Axillary Deep Melrose Lymph Node Biopsy with Injection of Radiotracer/Blue Dye and Intraoperative Lymphatic Mapping    Estimated Blood Loss (EBL):  <20 mL      Specimen(s):  submitted    Indications:  Lola Lerner presents for the above procedures.  Risks and benefits were reviewed including bleeding, infection, damage to local structures, margin positivity, scar, cosmetic deformity, lymhedema, need for additional procedures, death, and imponderables.  She understands and gave informed consent to proceed.    Details:  The patient was transported to the operating room and satisfactory anesthesia established.  Preoperative antibiotics were administered.  The patient was placed in the supine position and extremities were padded and protected throughout.  Technetium radiotracer was injected intradermally in the areolar skin.  Blue dye was injected intraparenchymally.      The operative field was prepped and draped in sterile fashion.  A timeout was performed.  A field block was established with local anesthetic.  Melrose nodes were localized with the gamma probe and an incision made in the ipsilateral axilla.  Cautery and clips were used to identify the sentinel nodes which were submitted for permanent section.  Following SLN excision, background counts were well below 10% of the maximal ex vivo SLN count and there were no additional enlarged, blue-stained or abnormal nodes noted.      The lumpectomy site was  localized with the ultrasound and radar-reflector probes.  A periareolar incision was made and appropriate flaps raised.  Partial mastectomy was performed under ultrasound, palpation and radar-reflector guidance circumferentially through normal parenchyma around the lesion.  The deep margin was fascia.  The specimen was delivered, oriented with ink and submitted for in-room radiography which confirmed we had removed the biopsy clip, reflector, and the lesion with adequate radiographic margins.  The specimen was submitted for pathology.  Additional circumferential shave margins were taken with ink on the final patient-side margin.    The operative sites were made hemostatic and closed in layers with absorbable sutures and Dermabond.  Fluffs and surgical bra were applied.    All needle, lap, and sponge counts were reported as correct.  I communicated the intraoperative findings with the family following the procedure.     Condition: Good    Disposition: PACU - hemodynamically stable.    Attestation: I was present and scrubbed for the key portions of the procedure.

## 2022-10-30 NOTE — PROGRESS NOTES
General Surgery Clinic Follow Up Note    Subjective:     Lola Lerner is a 82 y.o. female with [id number], stage IIA ER+ RI+ HER2- invasive lobular carcinoma of the left breast. She presents to clinic for follow up s/p left partial mastectomy and sentinel node biopsy on 10/19.  Final pathology is still pending. She denies fever, chills, chest pain or shortness of breath.  Her pain is well controlled.  Denies redness and drainage from incision.    Medications:  Current Outpatient Medications on File Prior to Visit   Medication Sig Dispense Refill    ipratropium (ATROVENT) 42 mcg (0.06 %) nasal spray USE 2 SPRAYS IN EACH NOSTRIL THREE TIMES DAILY AS NEEDED FOR RHINITIS 15 mL 3    levothyroxine (SYNTHROID) 25 MCG tablet       losartan-hydrochlorothiazide 100-25 mg (HYZAAR) 100-25 mg per tablet Take 1 tablet by mouth every morning. 1 Tablet Oral Every day      metoprolol succinate (TOPROL-XL) 25 MG 24 hr tablet 25 mg once daily.      mirabegron (MYRBETRIQ) 25 mg Tb24 ER tablet Take 1 tablet (25 mg total) by mouth once daily. 90 tablet 3    multivitamin (THERAGRAN) per tablet Take 1 tablet by mouth every morning.       oxyCODONE (ROXICODONE) 5 MG immediate release tablet Take 1 tablet (5 mg total) by mouth every 4 (four) hours as needed for Pain. 10 tablet 0    rosuvastatin (CRESTOR) 10 MG tablet Take 10 mg by mouth every evening. 1 Tablet Oral Every day       No current facility-administered medications on file prior to visit.         Objective:     PHYSICAL EXAM:  Vital Signs (Most Recent)       ROS A 10+ review of systems was performed with pertinent positives and negatives noted above in the history of present illness.  Other systems were negative unless otherwise specified.    Physical Exam:  Gen: no apparent distress, awake and alert  CV: regular rate/rhythm  Pulm: non-labored breathing, equal and bilateral chest rise  Breast: left breast incision c/d/I, no surrounding erythema or edema   Abd: soft,  non-distended  Ext: warm and well perfused, no edema  Neuro: motor and sensation grossly intact and symmetric     Assessment:     Lola Lerner is a 82 y.o. female with PMH HTN, HLD, overactive bladder, and left breast invasive lobular carcinoma presenting to clinic today for follow up s/p left partial mastectomy and SLNB on 10/19.    Plan:      - Return in 2 weeks for follow up office visit after pathology has resulted   - The patient is advised in continued exam of the breast chest wall and to report to this office sooner should she note any areas of abnormality or concern.    - She has been instructed to meet with med onc for discussion of adjuvant treatment recommendations    Toro Farris MD   [facility] PGY-1       I have seen the patient, reviewed the attached resident or FABIOLA's history and physical, assessment and plan. I have personally interviewed and examined the patient at bedside, reviewed the chart, relevant imaging/labs and agree with the findings.        Johnnie Spencer MD, FACS  Surgical Oncology  [facility] [place], [place]  Office: [phone]  Fax: [phone]

## 2022-10-31 ENCOUNTER — OFFICE VISIT (OUTPATIENT)
Dept: SURGERY | Facility: CLINIC | Age: 82
End: 2022-10-31
Payer: MEDICARE

## 2022-10-31 VITALS — DIASTOLIC BLOOD PRESSURE: 73 MMHG | SYSTOLIC BLOOD PRESSURE: 169 MMHG | HEART RATE: 67 BPM

## 2022-10-31 DIAGNOSIS — C50.919 INVASIVE LOBULAR CARCINOMA OF BREAST IN FEMALE: Primary | ICD-10-CM

## 2022-10-31 PROCEDURE — 99213 OFFICE O/P EST LOW 20 MIN: CPT | Mod: PBBFAC | Performed by: SURGERY

## 2022-10-31 PROCEDURE — 99024 POSTOP FOLLOW-UP VISIT: CPT | Mod: POP,,, | Performed by: SURGERY

## 2022-10-31 PROCEDURE — 99024 PR POST-OP FOLLOW-UP VISIT: ICD-10-PCS | Mod: POP,,, | Performed by: SURGERY

## 2022-10-31 PROCEDURE — 99999 PR PBB SHADOW E&M-EST. PATIENT-LVL III: CPT | Mod: PBBFAC,,, | Performed by: SURGERY

## 2022-10-31 PROCEDURE — 99999 PR PBB SHADOW E&M-EST. PATIENT-LVL III: ICD-10-PCS | Mod: PBBFAC,,, | Performed by: SURGERY

## 2022-11-01 ENCOUNTER — DOCUMENTATION ONLY (OUTPATIENT)
Dept: HEMATOLOGY/ONCOLOGY | Facility: CLINIC | Age: 82
End: 2022-11-01
Payer: MEDICARE

## 2022-11-01 LAB
FINAL PATHOLOGIC DIAGNOSIS: NORMAL
GROSS: NORMAL
Lab: NORMAL
MICROSCOPIC EXAM: NORMAL

## 2022-11-01 NOTE — PROGRESS NOTES
Met with pt during her post op appt with Dr Spencer on 10/31/22.  Pt to see Dr Oconnell and Dr Spencer in 2 weeks.  I informed the pt and spouse that I would call them with the date and time of these appts once they are scheduled.  Pt and spouse verbalized understanding.  No additional assistance needed.  Oncology Navigation   Intake  Date of Diagnosis: 09/20/22  Cancer Type: Breast  Internal / External Referral: Internal  Date of Referral: 09/26/22  Initial Nurse Navigator Contact: 09/26/22  Referral to Initial Contact Timeline (days): 0  Date Worked: 09/28/22  First Appointment Available: 09/26/22  Appointment Date: 09/26/22  First Available Date vs. Scheduled Date (days): 0     Treatment  Current Status: Staging work-up    Surgical Oncologist: [place]  Consult Date: 09/26/22    Medical Oncologist: Anish  Consult Date: 10/03/22    Radiation Oncologist: Hayley    Procedures: Biopsy; MRI; Ultrasound; Dexa; Mammogram  Biopsy Schedule Date: 09/20/22  DEXA Schedule Date: 09/14/22  Mammo Schedule Date: 09/14/22  MRI Schedule Date: 09/27/22  Ultrasound Schedule Date: 09/14/22    General Referrals: [facility]                                             hide    ER: Positive  LA: Positive  Her2: -- (fish)    Radiation Oncologist: Hayley    Support Systems: Spouse/significant other     Acuity  Treatment Tolerability: Has not started treatment yet/treatment fully completed and side effects resolved   Needed: 0  Support: 0  Verbalizes Financial Concerns: 0  Transportation: 0  History of noncompliance/frequent no shows and cancellations: 0  Verbalizes the need for more education: 0  Navigation Acuity: 0     Follow Up  No follow-ups on file.

## 2022-11-21 ENCOUNTER — OFFICE VISIT (OUTPATIENT)
Dept: HEMATOLOGY/ONCOLOGY | Facility: CLINIC | Age: 82
End: 2022-11-21
Payer: MEDICARE

## 2022-11-21 VITALS
RESPIRATION RATE: 17 BRPM | SYSTOLIC BLOOD PRESSURE: 157 MMHG | OXYGEN SATURATION: 92 % | HEART RATE: 83 BPM | BODY MASS INDEX: 26.02 KG/M2 | WEIGHT: 161.94 LBS | HEIGHT: 66 IN | DIASTOLIC BLOOD PRESSURE: 68 MMHG | TEMPERATURE: 98 F

## 2022-11-21 DIAGNOSIS — C64.1 MALIGNANT NEOPLASM OF RIGHT KIDNEY: ICD-10-CM

## 2022-11-21 DIAGNOSIS — C50.912 INVASIVE LOBULAR CARCINOMA OF BREAST, STAGE 2, LEFT: Primary | ICD-10-CM

## 2022-11-21 PROCEDURE — 99215 OFFICE O/P EST HI 40 MIN: CPT | Mod: S$PBB,,, | Performed by: INTERNAL MEDICINE

## 2022-11-21 PROCEDURE — 99215 PR OFFICE/OUTPT VISIT, EST, LEVL V, 40-54 MIN: ICD-10-PCS | Mod: S$PBB,,, | Performed by: INTERNAL MEDICINE

## 2022-11-21 PROCEDURE — 99214 OFFICE O/P EST MOD 30 MIN: CPT | Mod: PBBFAC | Performed by: INTERNAL MEDICINE

## 2022-11-21 PROCEDURE — 99999 PR PBB SHADOW E&M-EST. PATIENT-LVL IV: ICD-10-PCS | Mod: PBBFAC,,, | Performed by: INTERNAL MEDICINE

## 2022-11-21 PROCEDURE — 99999 PR PBB SHADOW E&M-EST. PATIENT-LVL IV: CPT | Mod: PBBFAC,,, | Performed by: INTERNAL MEDICINE

## 2022-11-21 RX ORDER — TAMOXIFEN CITRATE 20 MG/1
20 TABLET ORAL DAILY
Qty: 90 TABLET | Refills: 1 | Status: SHIPPED | OUTPATIENT
Start: 2022-11-21 | End: 2023-07-25

## 2022-11-21 NOTE — PROGRESS NOTES
Subjective:       Patient ID: Lola Lerner is a 82 y.o. female.    Chief Complaint: Malignant neoplasm of right kidney    HPI    Presents for a medical oncology opinion with recent invasive lobular breast cancer diagnosis after completion of surgery  pT3 pN1mi     Oncology History:  - self detected a lump in her left breast about one month prior. She has not noted any pain, skin changes, or nipple discharge. She did report that her left nipple had changed somewhat (may be more inverted now). S     - she was up to date with screening mammograms up until 2018. She required a biopsy once for a suspicious finding on mammogram but this was ultimately benign.      - 9/14/2022 Diagnostic Mammogram:  Findings:  This procedure was performed using tomosynthesis. Computer-aided detection was utilized in the interpretation of this examination.  The breasts have scattered areas of fibroglandular density.   Mammo Digital Diagnostic Bilat with Jovanny  Left  Developing Asymmetry: There is a developing asymmetry seen in the upper inner quadrant of the left breast. The asymmetry correlates with the palpable mass and skin changes reported by the patient.   Right  There is no evidence of suspicious masses, calcifications, or other abnormal findings in the right breast.  US Breast Left Limited  Left  Mass: There is a 26 mm x 33 mm x 29 mm irregularly shaped, non-parallel, hypoechoic mass with indistinct margins with shadowing seen in the upper inner quadrant of the left breast at 10 o'clock, 2 cm from the nipple. The mass correlates with the palpable mass reported by the patient.    Targeted ultrasound of the left axilla is normal.  Impression:  Left  Mass: Left breast 26 mm x 33 mm x 29 mm mass at the upper inner 10 o'clock position. Assessment: 4A - Suspicious finding. Biopsy is recommended.   Right  There is no mammographic or sonographic evidence of malignancy in the right breast.  BI-RADS Category:   Overall: 4A - Low Suspicion  for Malignancy  Recommendation:  Biopsy is recommended. I discussed these findings and recommendations with the patient and her  in detail at the time of exam.     - 9/20/2022 Breast Biopsy:  Breast, left, ultrasound-guided core needle biopsies of mass:   - Invasive lobular carcinoma       - Present in all sampled cores (6 mm in greatest contiguous dimension)       - Aparna-Pal modified SBR score 3 + 2 + 1 = 6 of 9       - Histologic grade 2 of 3       - Mitotic index = 0.1 (average mitoses per high power field) (low)   - Biomarkers, assessed by immunohistochemistry on block 1A       - Estrogen Receptor (ER):  Positive (90%; strong intensity)       - Progesterone Receptor (ND):  Positive (70%; strong intensity)       - HER2:  Equivocal (2+)       - HER2 FISH pending; results to follow in a supplemental report       - Ki-67 proliferation index:  Averages 5-10%   - Lymphovascular invasion is not identified      - 9/29/2022 Unable to tolerate Breast MRI     - 10/19/2022 Lumpectomy and [place] [address] assessment with Dr. Spencer  Pathology:  1. LEFT BREAST TO INCLUDE A SEGMENT OF SKIN WITHOUT NIPPLE, PARTIAL   MASTECTOMY:   -  Extensive invasive pleomorphic lobular carcinoma, [place] histologic   grade 2, with focal lymphovascular invasion and focal microcalcifications;   tumor size = 72 mm.  The invasive carcinoma directly extends into the   anterior, inferior, superior, medial, and lateral margins.  The invasive   carcinoma is 1.9 mm from the posterior margin.  Please note that the anterior   margin is the only final margin on this specimen.  The skin is free of tumor.     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   -  Focal atypical ductal hyperplasia.   -  Focal flat epithelial atypia.   -  Multifocal complex sclerosing lesions with focal florid usual ductal   hyperplasia and focal intraductal micropapillomas.   -   Small fibroadenoma, tumor size = 4 mm.   -  Fibrocystic changes with columnar cell change, columnar cell hyperplasia,   adenosis with blunt duct adenosis and sclerosing adenosis, usual ductal   hyperplasia, apocrine metaplasia, microcysts, stromal fibrosis, and   microcalcifications.   -  Focal duct ectasia.   -  Moderate-to-marked arteriosclerosis.   -  Organizing biopsy site with fat necrosis, fibrosis and chronic   inflammation.   2. LEFT BREAST, ADDITIONAL SUPERIOR MARGIN, EXCISION:   -  No evidence of malignancy; there is no invasive lobular carcinoma or   lobular carcinoma in situ.   -  No breast ducts or lobules are present for evaluation.   3. LEFT BREAST, ADDITIONAL INFERIOR MARGIN, EXCISION:   -  Invasive pleomorphic lobular carcinoma, [place] histologic grade 2,   with multifocal lymphovascular invasion.  The invasive carcinoma focally   extends into the new inferior margin.   -  Focal lobular carcinoma in situ with duct extension.   -  Focal complex sclerosing lesion.   -  Fibrocystic changes with columnar cell change, columnar cell hyperplasia,   usual ductal hyperplasia, adenosis with blunt duct adenosis, apocrine   metaplasia, microcysts, stromal fibrosis and focal microcalcifications.   -  Focal duct ectasia.   4. LEFT BREAST, ADDITIONAL MEDIAL MARGIN, EXCISION:   -  Invasive pleomorphic lobular carcinoma, [place] histologic grade 2,   with focal lymphovascular invasion.  The new medial margin is free of tumor   and the invasive carcinoma is 0.9 mm from the new medial margin.   -  Focal lobular carcinoma in situ.   -  Fibrocystic changes with columnar cell change, usual ductal hyperplasia,   and stromal fibrosis.   5. LEFT BREAST, ADDITIONAL LATERAL MARGIN, EXCISION:   -  No evidence of malignancy; there is no residual invasive lobular carcinoma   or lobular carcinoma in situ.   -  Focal mild fibrocystic changes with columnar cell change, adenosis, and   stromal fibrosis.   6. LEFT BREAST,  ADDITIONAL POSTERIOR MARGIN, EXCISION:   -  No evidence of malignancy; there is no residual invasive lobular carcinoma   or lobular carcinoma in situ.   -  No breast ducts or lobules are present for evaluation.   7. LEFT AXILLARY SENTINEL LYMPH NODES (5), EXCISIONAL BIOPSY:   -  1 out of 5 lymph nodes demonstrates micrometastatic pleomorphic lobular   carcinoma; size of metastatic deposit = 1.4 mm.  There is no evidence of   extranodal extension.   -  Mild reactive lymphoid hyperplasia, mixed pattern.   -  Mild lipomatosis.   INVASIVE CARCINOMA OF THE BREAST, RESECTION, CANCER CASE SUMMARY:   PROCEDURE:  Partial mastectomy/lumpectomy with segment of skin without nipple.   SPECIMEN LATERALITY:  Left.   TUMOR SITE:  Not specified.   TUMOR, HISTOLOGIC TYPE:  Invasive pleomorphic lobular carcinoma.   TUMOR, HISTOLOGIC GRADE (ANTONIO HISTOLOGIC SCORE):        Glandular (acinar)/tubular differentiation:  Score 3.        Nuclear pleomorphism:  Score 2.        Mitotic rate:  Score 1.        Overall grade:  Grade 2 (score 6).   TUMOR SIZE:  72 mm.   TUMOR FOCALITY:  Can not be determined.   DUCTAL CARCINOMA IN SITU (DCIS):  Not identified.   TUMOR EXTENT:        Skin:  Skin is present and uninvolved.        Nipple and skeletal muscle:  Not applicable (nipple and skeletal muscle   are absent).   LYMPHOVASCULAR INVASION:  Present.   DERMAL LYMPHOVASCULAR INVASION:  Not identified.   MICROCALCIFICATIONS:  Present in invasive carcinoma, LCIS, and non-neoplastic   tissue.   TREATMENT EFFECT IN THE BREAST:  No known presurgical therapy.   MARGIN STATUS FOR INVASIVE CARCINOMA:        MARGINS INVOLVED BY INVASIVE CARCINOMA:  The anterior and inferior   margins are involved by invasive carcinoma.             Extent of involvement of anterior margin:  4 mm.             Extent of involvement of inferior margin:  Less than 1 mm.        MARGINS UNINVOLVED BY INVASIVE CARCINOMA:  The medial, superior,   posterior and lateral margins  are uninvolved by invasive carcinoma.             Distance from invasive carcinoma to medial margin:  0.9 mm.             Distance from invasive carcinoma to superior margin:  8 mm.             Distance from invasive carcinoma to posterior margin:  9.9 mm.             Distance from invasive carcinoma to lateral margin:  13 mm.   REGIONAL LYMPH NODE STATUS:  Regional lymph nodes present; tumor present in   regional lymph node.        Number of lymph nodes with macrometastasis:  0.        Number of lymph nodes with micrometastasis:  1.        Number of lymph nodes with isolated tumor cells:  0.        Size of largest willy metastatic deposit:  1.4 mm.        Extranodal extension:  Not identified.        Total number of lymph nodes examined (sentinel and nonsentinel):  5.        Number of sentinel nodes examined:  5.   DISTANT METASTASIS:  Not applicable.   PATHOLOGIC STAGE CLASSIFICATION (pTNM, AJCC 8TH EDITION):   pT3:  Tumor size = 72 mm (greater than 50 mm in greatest dimension).   pN1mi (sn):  Micrometastasis in 1 axillary sentinel lymph node.   pM: Not applicable.   ADDITIONAL FINDINGS:  Please see above final diagnoses on specimens 1-7.   BREAST BIOMARKER TESTING PERFORMED ON PREVIOUS BIOPSY, CASE [id number]   (interpreted by Dr. Esteban Grace):  Estrogen receptor (ER):  Positive   (90%; strong intensity)   Progesterone receptor (PGR):  Positive (70%; strong intensity).   HER2 by IHC:  Equivocal (2+).   HER2 by FISH:  Negative (performed at [facility]).   Ki-67 proliferation index:  Averages 5-10%.      GynHx:   Menarche around age 14 but unsure  Menopause in early 50s, unsure  A0  Very minimal breastfeeding     FH:  No personal history of breast cancer, no family history of breast cancer. No family history of prostate, endometrial, or ovarian cancer.      SH:    Never smoker, non drinker     PMH:       Active Ambulatory Problems     Diagnosis Date Noted    Hypertension 2013     Hyperlipidemia 07/17/2013    Recurrent UTI 04/28/2016    OAB (overactive bladder) 04/28/2016    Malignant neoplasm of right kidney 08/17/2016    Invasive lobular carcinoma of breast, stage 2, left 10/03/2022           Resolved Ambulatory Problems     Diagnosis Date Noted    No Resolved Ambulatory Problems           Past Medical History:   Diagnosis Date    Urge incontinence      Vaginal delivery             Review of Systems   Constitutional:  Negative for activity change, appetite change, chills, fatigue, fever and unexpected weight change.   HENT:  Negative for nasal congestion, mouth sores, postnasal drip, rhinorrhea, sinus pressure/congestion and trouble swallowing.    Eyes:  Negative for visual disturbance.   Respiratory:  Negative for cough and shortness of breath.    Cardiovascular:  Negative for chest pain, palpitations and leg swelling.   Gastrointestinal:  Negative for abdominal pain, diarrhea, nausea, vomiting and reflux.   Genitourinary:  Negative for bladder incontinence, difficulty urinating, dysuria and frequency.   Musculoskeletal:  Negative for arthralgias, back pain, gait problem, joint swelling and joint deformity.   Integumentary:  Negative for rash.   Neurological:  Negative for dizziness, weakness, light-headedness, numbness and headaches.   Hematological:  Negative for adenopathy. Does not bruise/bleed easily.   Psychiatric/Behavioral:  Negative for dysphoric mood and sleep disturbance. The patient is not nervous/anxious.        Objective:      Physical Exam  Vitals and nursing note reviewed.   Constitutional:       General: She is not in acute distress.     Appearance: She is well-developed.   HENT:      Head: Normocephalic and atraumatic.      Right Ear: External ear normal.      Left Ear: External ear normal.      Mouth/Throat:      Pharynx: No oropharyngeal exudate.   Eyes:      General: No scleral icterus.        Right eye: No discharge.         Left eye: No discharge.       Conjunctiva/sclera: Conjunctivae normal.      Pupils: Pupils are equal, round, and reactive to light.      Right eye: Pupil is round and reactive.      Left eye: Pupil is round and reactive.   Neck:      Thyroid: No thyromegaly.      Vascular: No JVD.      Trachea: No tracheal deviation.   Cardiovascular:      Rate and Rhythm: Normal rate and regular rhythm.      Heart sounds: Normal heart sounds. No murmur heard.    No friction rub. No gallop.   Pulmonary:      Effort: Pulmonary effort is normal. No respiratory distress.      Breath sounds: Normal breath sounds. No wheezing or rales.   Abdominal:      General: Bowel sounds are normal. There is no distension.      Palpations: Abdomen is soft. There is no mass.      Tenderness: There is no abdominal tenderness. There is no guarding or rebound.   Musculoskeletal:         General: No tenderness. Normal range of motion.      Cervical back: Normal range of motion and neck supple.   Lymphadenopathy:      Head:      Right side of head: No submandibular adenopathy.      Left side of head: No submandibular adenopathy.      Cervical: No cervical adenopathy.      Right cervical: No superficial, deep or posterior cervical adenopathy.     Left cervical: No superficial, deep or posterior cervical adenopathy.      Upper Body:      Right upper body: No supraclavicular adenopathy.      Left upper body: No supraclavicular adenopathy.   Skin:     General: Skin is warm and dry.      Coloration: Skin is not pale.      Findings: No bruising, erythema, lesion, petechiae or rash.   Neurological:      Mental Status: She is alert and oriented to person, place, and time.      Cranial Nerves: No cranial nerve deficit.      Sensory: No sensory deficit.      Coordination: Coordination normal.      Deep Tendon Reflexes: Reflexes are normal and symmetric.   Psychiatric:         Mood and Affect: Mood is not anxious or depressed.         Behavior: Behavior normal.         Thought Content: Thought  content normal.         Judgment: Judgment normal.       Assessment:       Problem List Items Addressed This Visit       Malignant neoplasm of right kidney    Invasive lobular carcinoma of breast, stage 2, left - Primary    Relevant Medications    tamoxifen (NOLVADEX) 20 MG Tab       Plan:       Reviewed breast cancer tumor characteristics on final surgical pathology  Tamoxifen sent for now based on hormone receptor status and after discussion of treatment options (Tamoxifen and aromatase inhibitors) and potential side effects- we may adjust at later date    Rad Onc referral    Oncotype to be sent to at least clarify if chemotherapy has a role- will discuss carefully post    RTC post above    Route Chart for Scheduling    Med Onc Chart Routing  Urgent    Follow up with physician 1 month. rad onc referral same day as breast surgery if possible   Follow up with FABIOLA    Infusion scheduling note    Injection scheduling note    Labs    Imaging    Pharmacy appointment    Other referrals

## 2022-11-23 DIAGNOSIS — C50.912 INVASIVE LOBULAR CARCINOMA OF BREAST, STAGE 2, LEFT: Primary | ICD-10-CM

## 2022-11-28 ENCOUNTER — OFFICE VISIT (OUTPATIENT)
Dept: SURGERY | Facility: CLINIC | Age: 82
End: 2022-11-28
Payer: MEDICARE

## 2022-11-28 VITALS
HEART RATE: 69 BPM | HEIGHT: 66 IN | OXYGEN SATURATION: 99 % | DIASTOLIC BLOOD PRESSURE: 73 MMHG | WEIGHT: 159.38 LBS | BODY MASS INDEX: 25.61 KG/M2 | SYSTOLIC BLOOD PRESSURE: 183 MMHG

## 2022-11-28 DIAGNOSIS — C50.912 INVASIVE LOBULAR CARCINOMA OF LEFT BREAST IN FEMALE: Primary | ICD-10-CM

## 2022-11-28 PROCEDURE — 99024 POSTOP FOLLOW-UP VISIT: CPT | Mod: POP,,, | Performed by: SURGERY

## 2022-11-28 PROCEDURE — 99024 PR POST-OP FOLLOW-UP VISIT: ICD-10-PCS | Mod: POP,,, | Performed by: SURGERY

## 2022-11-28 PROCEDURE — 99213 OFFICE O/P EST LOW 20 MIN: CPT | Mod: PBBFAC | Performed by: SURGERY

## 2022-11-28 PROCEDURE — 99999 PR PBB SHADOW E&M-EST. PATIENT-LVL III: ICD-10-PCS | Mod: PBBFAC,,, | Performed by: SURGERY

## 2022-11-28 PROCEDURE — 99999 PR PBB SHADOW E&M-EST. PATIENT-LVL III: CPT | Mod: PBBFAC,,, | Performed by: SURGERY

## 2022-11-28 NOTE — PROGRESS NOTES
BREAST SURGERY CLINIC NOTE    CC:  S/P R partial mastectomy w/ SNL biopsy    HPI:  Lola Lerner is a 82 y.o. female s/p left partial mastectomy with SNL biopsy on 10/19 who presents for follow up. She is here with her  and daughter. She is doing well overall - denies pain, fever, chills. She saw her oncologist last week who will start her on Tamoxifen. She will also be seeing rad onc this Friday for possible interventions. Margins are positive and 1 out of 5 lymph nodes is positive (Full path report is below).     ROS:   Review of Systems   Constitutional: Negative.    HENT: Negative.     Eyes: Negative.    Respiratory: Negative.     Cardiovascular: Negative.    Gastrointestinal: Negative.    Genitourinary: Negative.    Musculoskeletal: Negative.    Skin: Negative.    Neurological: Negative.    Endo/Heme/Allergies: Negative.    Psychiatric/Behavioral: Negative.        Past Medical History:   Diagnosis Date    Cancer of kidney     Hyperlipidemia     Hypertension     Invasive lobular carcinoma of breast, stage 2, left 10/03/2022    Urge incontinence     sees Dr. Julien, urology    Vaginal delivery     x3       Past Surgical History:   Procedure Laterality Date    BREAST BIOPSY Right     Core bx,. benign    CHOLECYSTECTOMY      LUMPECTOMY,BREAST, WITH RADIOACTIVE SEED LOCALIZATION AND SENTINEL LYMPH NODE BIOPSY Left 10/19/2022    Procedure: LUMPECTOMY,BREAST,WITH RADIOLOGIC MARKER LOCALIZATION AND SENTINEL LYMPH NODE BIOPSY;  Surgeon: Johnnie Spencer MD;  Location: [facility] OR [address] [place] [address];  Service: General;  Laterality: Left;    PARTIAL NEPHRECTOMY         Current Outpatient Medications on File Prior to Visit   Medication Sig Dispense Refill    ipratropium (ATROVENT) 42 mcg (0.06 %) nasal spray USE 2 SPRAYS IN EACH NOSTRIL THREE TIMES DAILY AS NEEDED FOR RHINITIS 15 mL 3    levothyroxine (SYNTHROID) 25 MCG tablet       losartan-hydrochlorothiazide 100-25 mg (HYZAAR) 100-25 mg per tablet Take 1 tablet by  mouth every morning. 1 Tablet Oral Every day      metoprolol succinate (TOPROL-XL) 25 MG 24 hr tablet 25 mg once daily.      mirabegron (MYRBETRIQ) 25 mg Tb24 ER tablet Take 1 tablet (25 mg total) by mouth once daily. 90 tablet 3    multivitamin (THERAGRAN) per tablet Take 1 tablet by mouth every morning.       rosuvastatin (CRESTOR) 10 MG tablet Take 10 mg by mouth every evening. 1 Tablet Oral Every day      tamoxifen (NOLVADEX) 20 MG Tab Take 1 tablet (20 mg total) by mouth once daily. 90 tablet 1    oxyCODONE (ROXICODONE) 5 MG immediate release tablet Take 1 tablet (5 mg total) by mouth every 4 (four) hours as needed for Pain. (Patient not taking: Reported on 10/31/2022) 10 tablet 0     No current facility-administered medications on file prior to visit.       Social History     Socioeconomic History    Marital status:    Tobacco Use    Smoking status: Never    Smokeless tobacco: Never   Substance and Sexual Activity    Alcohol use: No    Drug use: No    Sexual activity: Not Currently     Partners: Male   Social History Narrative     x 50 yr.  .  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          Review of patient's allergies indicates:  No Known Allergies      PHYSICAL EXAM:  Vitals:    22 1321   BP: (!) 183/73   Pulse: 69       Physical Exam  Vitals and nursing note reviewed.   Constitutional:       Appearance: Normal appearance. She is normal weight.   HENT:      Head: Normocephalic.      Nose: Nose normal.      Mouth/Throat:      Mouth: Mucous membranes are moist.      Pharynx: Oropharynx is clear.   Cardiovascular:      Rate and Rhythm: Normal rate and regular rhythm.      Pulses: Normal pulses.      Heart sounds: Normal heart sounds.   Pulmonary:      Effort: Pulmonary effort is normal.      Breath sounds: Normal breath sounds.   Chest:      Comments: R breast incision healing appropriately.   Abdominal:      General: Abdomen is flat. Bowel sounds are normal.      Palpations: Abdomen is soft.    Musculoskeletal:         General: Normal range of motion.      Cervical back: Normal range of motion.   Skin:     General: Skin is warm and dry.   Neurological:      General: No focal deficit present.      Mental Status: She is alert and oriented to person, place, and time.   Psychiatric:         Mood and Affect: Mood normal.         Behavior: Behavior normal.     PERTINENT PATHOLOGY:  1. LEFT BREAST TO INCLUDE A SEGMENT OF SKIN WITHOUT NIPPLE, PARTIAL   MASTECTOMY:   -  Extensive invasive pleomorphic lobular carcinoma, [place] histologic   grade 2, with focal lymphovascular invasion and focal microcalcifications;   tumor size = 72 mm.  The invasive carcinoma directly extends into the   anterior, inferior, superior, medial, and lateral margins.  The invasive   carcinoma is 1.9 mm from the posterior margin.  Please note that the anterior   margin is the only final margin on this specimen.  The skin is free of tumor.     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   -  Focal atypical ductal hyperplasia.   -  Focal flat epithelial atypia.   -  Multifocal complex sclerosing lesions with focal florid usual ductal   hyperplasia and focal intraductal micropapillomas.   -  Small fibroadenoma, tumor size = 4 mm.   -  Fibrocystic changes with columnar cell change, columnar cell hyperplasia,   adenosis with blunt duct adenosis and sclerosing adenosis, usual ductal   hyperplasia, apocrine metaplasia, microcysts, stromal fibrosis, and   microcalcifications.   -  Focal duct ectasia.   -  Moderate-to-marked arteriosclerosis.   -  Organizing biopsy site with fat necrosis, fibrosis and chronic   inflammation.   2. LEFT BREAST, ADDITIONAL SUPERIOR MARGIN, EXCISION:   -  No evidence of malignancy; there is no invasive lobular carcinoma or   lobular carcinoma in situ.   -  No breast ducts or lobules are present for evaluation.   3. LEFT  BREAST, ADDITIONAL INFERIOR MARGIN, EXCISION:   -  Invasive pleomorphic lobular carcinoma, [place] histologic grade 2,   with multifocal lymphovascular invasion.  The invasive carcinoma focally   extends into the new inferior margin.   -  Focal lobular carcinoma in situ with duct extension.   -  Focal complex sclerosing lesion.   -  Fibrocystic changes with columnar cell change, columnar cell hyperplasia,   usual ductal hyperplasia, adenosis with blunt duct adenosis, apocrine   metaplasia, microcysts, stromal fibrosis and focal microcalcifications.   -  Focal duct ectasia.   4. LEFT BREAST, ADDITIONAL MEDIAL MARGIN, EXCISION:   -  Invasive pleomorphic lobular carcinoma, [place] histologic grade 2,   with focal lymphovascular invasion.  The new medial margin is free of tumor   and the invasive carcinoma is 0.9 mm from the new medial margin.   -  Focal lobular carcinoma in situ.   -  Fibrocystic changes with columnar cell change, usual ductal hyperplasia,   and stromal fibrosis.   5. LEFT BREAST, ADDITIONAL LATERAL MARGIN, EXCISION:   -  No evidence of malignancy; there is no residual invasive lobular carcinoma   or lobular carcinoma in situ.   -  Focal mild fibrocystic changes with columnar cell change, adenosis, and   stromal fibrosis.   6. LEFT BREAST, ADDITIONAL POSTERIOR MARGIN, EXCISION:   -  No evidence of malignancy; there is no residual invasive lobular carcinoma   or lobular carcinoma in situ.   -  No breast ducts or lobules are present for evaluation.   7. LEFT AXILLARY SENTINEL LYMPH NODES (5), EXCISIONAL BIOPSY:   -  1 out of 5 lymph nodes demonstrates micrometastatic pleomorphic lobular   carcinoma; size of metastatic deposit = 1.4 mm.  There is no evidence of   extranodal extension.   -  Mild reactive lymphoid hyperplasia, mixed pattern.   -  Mild lipomatosis.   INVASIVE CARCINOMA OF THE BREAST, RESECTION, CANCER CASE SUMMARY:   PROCEDURE:  Partial mastectomy/lumpectomy with segment of skin  without nipple.   SPECIMEN LATERALITY:  Left.   TUMOR SITE:  Not specified.   TUMOR, HISTOLOGIC TYPE:  Invasive pleomorphic lobular carcinoma.   TUMOR, HISTOLOGIC GRADE (ANTONIO HISTOLOGIC SCORE):        Glandular (acinar)/tubular differentiation:  Score 3.        Nuclear pleomorphism:  Score 2.        Mitotic rate:  Score 1.        Overall grade:  Grade 2 (score 6).   TUMOR SIZE:  72 mm.   TUMOR FOCALITY:  Can not be determined.   DUCTAL CARCINOMA IN SITU (DCIS):  Not identified.   TUMOR EXTENT:        Skin:  Skin is present and uninvolved.        Nipple and skeletal muscle:  Not applicable (nipple and skeletal muscle   are absent).   LYMPHOVASCULAR INVASION:  Present.   DERMAL LYMPHOVASCULAR INVASION:  Not identified.   MICROCALCIFICATIONS:  Present in invasive carcinoma, LCIS, and non-neoplastic   tissue.   TREATMENT EFFECT IN THE BREAST:  No known presurgical therapy.   MARGIN STATUS FOR INVASIVE CARCINOMA:        MARGINS INVOLVED BY INVASIVE CARCINOMA:  The anterior and inferior   margins are involved by invasive carcinoma.             Extent of involvement of anterior margin:  4 mm.             Extent of involvement of inferior margin:  Less than 1 mm.        MARGINS UNINVOLVED BY INVASIVE CARCINOMA:  The medial, superior,   posterior and lateral margins are uninvolved by invasive carcinoma.             Distance from invasive carcinoma to medial margin:  0.9 mm.             Distance from invasive carcinoma to superior margin:  8 mm.             Distance from invasive carcinoma to posterior margin:  9.9 mm.             Distance from invasive carcinoma to lateral margin:  13 mm.   REGIONAL LYMPH NODE STATUS:  Regional lymph nodes present; tumor present in   regional lymph node.        Number of lymph nodes with macrometastasis:  0.        Number of lymph nodes with micrometastasis:  1.        Number of lymph nodes with isolated tumor cells:  0.        Size of largest willy metastatic deposit:  1.4 mm.         Extranodal extension:  Not identified.        Total number of lymph nodes examined (sentinel and nonsentinel):  5.        Number of sentinel nodes examined:  5.   DISTANT METASTASIS:  Not applicable.   PATHOLOGIC STAGE CLASSIFICATION (pTNM, AJCC 8TH EDITION):   pT3:  Tumor size = 72 mm (greater than 50 mm in greatest dimension).   pN1mi (sn):  Micrometastasis in 1 axillary sentinel lymph node.   pM: Not applicable.   ADDITIONAL FINDINGS:  Please see above final diagnoses on specimens 1-7.   BREAST BIOMARKER TESTING PERFORMED ON PREVIOUS BIOPSY, CASE [id number]   (interpreted by Dr. Esteban Grace):  Estrogen receptor (ER):  Positive   (90%; strong intensity)   Progesterone receptor (PGR):  Positive (70%; strong intensity).   HER2 by IHC:  Equivocal (2+).   HER2 by FISH:  Negative (performed at [facility]).   Ki-67 proliferation index:  Averages 5-10%.    ASSESSMENT/PLAN:  Lola Lerner is a 82 y.o. female s/p left partial mastectomy with SNL biopsy on 10/19. Patient's margins were positive. We recommend against further surgical treatment at this time.    - Recommended patient to see Rad onc this Friday for possible intervention with 1 positive lymph node.  - Start tamoxifen  - RTC 6 months.    Vito Dawson MD  [facility] PGY-I  Pager: [phone]

## 2022-12-01 ENCOUNTER — OFFICE VISIT (OUTPATIENT)
Dept: RADIATION ONCOLOGY | Facility: CLINIC | Age: 82
End: 2022-12-01
Payer: MEDICARE

## 2022-12-01 VITALS
HEART RATE: 62 BPM | RESPIRATION RATE: 16 BRPM | SYSTOLIC BLOOD PRESSURE: 190 MMHG | WEIGHT: 160.19 LBS | DIASTOLIC BLOOD PRESSURE: 82 MMHG | HEIGHT: 67 IN | BODY MASS INDEX: 25.14 KG/M2

## 2022-12-01 DIAGNOSIS — C50.912 INVASIVE LOBULAR CARCINOMA OF LEFT BREAST IN FEMALE: Primary | ICD-10-CM

## 2022-12-01 PROCEDURE — 99213 OFFICE O/P EST LOW 20 MIN: CPT | Mod: PBBFAC | Performed by: RADIOLOGY

## 2022-12-01 PROCEDURE — 99215 OFFICE O/P EST HI 40 MIN: CPT | Mod: S$PBB,,, | Performed by: RADIOLOGY

## 2022-12-01 PROCEDURE — 99215 PR OFFICE/OUTPT VISIT, EST, LEVL V, 40-54 MIN: ICD-10-PCS | Mod: S$PBB,,, | Performed by: RADIOLOGY

## 2022-12-01 PROCEDURE — 99999 PR PBB SHADOW E&M-EST. PATIENT-LVL III: ICD-10-PCS | Mod: PBBFAC,,, | Performed by: RADIOLOGY

## 2022-12-01 PROCEDURE — 99999 PR PBB SHADOW E&M-EST. PATIENT-LVL III: CPT | Mod: PBBFAC,,, | Performed by: RADIOLOGY

## 2022-12-01 NOTE — PROGRESS NOTES
PATIENT IDENTIFICATION:  Patient Name: Lola Lerner  MRN: [id number]  : 1940    DIAGNOSIS:  Cancer Staging   Invasive lobular carcinoma of left breast in female  Staging form: Breast, AJCC 8th Edition  - Clinical: Stage IB (cT2, cN0, cM0, G2, ER+, AR+, HER2-) - Signed by Catherine Hardy MD on 10/3/2022  - Pathologic stage from 10/19/2022: Stage IB (pT3, pN1mi(sn), cM0, G2, ER+, AR+, HER2-) - Signed by Catherine Hardy MD on 2022      HISTORY OF PRESENT ILLNESS:   The patient is an 82 year old woman with prognostic Stage IB invasive lobular carcinoma of the left breast.       The patient underwent diagnostic mammogram on 2022.  Imaging revealed an asymmetry in the upper inner quadrant of the left breast.  The mass measured 26 x 33 x 29 mm with indistinct margins on ultrasound.  Ultrasound-guided biopsy was performed of the mass which revealed grade 2 invasive lobular carcinoma.  On immunohistochemistry, tumor cells ERPR positive and HER2 negative by fish.  The Ki-67 proliferative index averages 5-10%.     MRI was ordered for further evaluation but the patient was not able to tolerate the exam.     The patient was taken to the operating room on 10/19/2022 for left breast partial mastectomy and sentinel lymph node dissection.  Final pathology confirmed the presence of a grade 2 invasive lobular carcinoma measuring 7.2 cm in greatest dimension.  The anterior and inferior margins were positive.  The extent of involvement of the anterior margin was 4 mm in the inferior margin was positive over less than 1 mm.  The medial margin was close at 0.9 mm.  1/5 lymph nodes contained evidence of a micrometastasis measuring 1.4 mm.    The patient has been recommended adjuvant endocrine therapy with tamoxifen.  She has not been recommended additional surgery.  She has been referred to our department for consideration of adjuvant radiation therapy.  Oncology History   Invasive lobular carcinoma of left breast  in female   10/3/2022 Initial Diagnosis    Invasive lobular carcinoma of breast, stage 2, left     10/3/2022 Cancer Staged    Staging form: Breast, AJCC 8th Edition  - Clinical: Stage IB (cT2, cN0, cM0, G2, ER+, UT+, HER2-)       10/19/2022 Cancer Staged    Staging form: Breast, AJCC 8th Edition  - Pathologic stage from 10/19/2022: Stage IB (pT3, pN1mi(sn), cM0, G2, ER+, UT+, HER2-)       10/19/2022 Surgery    Surgeon: Dr. Johnnie Spencer  Left breast partial mastectomy and SLN          REVIEW OF SYSTEMS:   Review of Systems   Constitutional:  Negative for fever, malaise/fatigue and weight loss.   HENT:  Negative for ear pain, hearing loss, sinus pain and sore throat.    Eyes:  Negative for blurred vision, double vision and pain.   Respiratory:  Negative for cough, hemoptysis, shortness of breath and wheezing.    Cardiovascular:  Negative for chest pain, palpitations and leg swelling.   Gastrointestinal:  Negative for abdominal pain, blood in stool, constipation, diarrhea, heartburn, nausea and vomiting.   Genitourinary:  Negative for dysuria, frequency, hematuria and urgency.   Musculoskeletal:  Negative for back pain and joint pain.   Skin:  Negative for itching and rash.   Neurological:  Negative for tingling, focal weakness, seizures and headaches.   Psychiatric/Behavioral:  Negative for depression. The patient is not nervous/anxious.      PAST MEDICAL HISTORY:  Past Medical History:   Diagnosis Date    Cancer of kidney     Hyperlipidemia     Hypertension     Invasive lobular carcinoma of breast, stage 2, left 10/03/2022    Urge incontinence     sees Dr. Julien, urology    Vaginal delivery     x3       PAST SURGICAL HISTORY:  Past Surgical History:   Procedure Laterality Date    BREAST BIOPSY Right     Core bx,. benign    CHOLECYSTECTOMY      LUMPECTOMY,BREAST, WITH RADIOACTIVE SEED LOCALIZATION AND SENTINEL LYMPH NODE BIOPSY Left 10/19/2022    Procedure: LUMPECTOMY,BREAST,WITH RADIOLOGIC MARKER LOCALIZATION AND  SENTINEL LYMPH NODE BIOPSY;  Surgeon: Johnnie Spencer MD;  Location: [facility] OR [address] [place] [address];  Service: General;  Laterality: Left;    PARTIAL NEPHRECTOMY         ALLERGIES:   Review of patient's allergies indicates:  No Known Allergies    MEDICATIONS:  Current Outpatient Medications   Medication Sig    ipratropium (ATROVENT) 42 mcg (0.06 %) nasal spray USE 2 SPRAYS IN EACH NOSTRIL THREE TIMES DAILY AS NEEDED FOR RHINITIS    levothyroxine (SYNTHROID) 25 MCG tablet     losartan-hydrochlorothiazide 100-25 mg (HYZAAR) 100-25 mg per tablet Take 1 tablet by mouth every morning. 1 Tablet Oral Every day    metoprolol succinate (TOPROL-XL) 25 MG 24 hr tablet 25 mg once daily.    mirabegron (MYRBETRIQ) 25 mg Tb24 ER tablet Take 1 tablet (25 mg total) by mouth once daily.    multivitamin (THERAGRAN) per tablet Take 1 tablet by mouth every morning.     rosuvastatin (CRESTOR) 10 MG tablet Take 10 mg by mouth every evening. 1 Tablet Oral Every day    tamoxifen (NOLVADEX) 20 MG Tab Take 1 tablet (20 mg total) by mouth once daily.     No current facility-administered medications for this visit.       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  .  Ret:  RN [employer]          FAMILY HISTORY:  Family History   Problem Relation Age of Onset    Heart disease Mother          PHYSICAL EXAMINATION:  Vitals:    22 1312   BP: (!) 190/82   Pulse: 62   Resp: 16     Body mass index is 25.09 kg/m².    ECO  Physical Exam  Constitutional:       Appearance: Normal appearance.   HENT:      Head: Normocephalic and atraumatic.      Nose: Nose normal.      Mouth/Throat:      Mouth: Mucous membranes are moist.   Cardiovascular:      Rate and Rhythm: Normal rate.    Pulmonary:      Effort: Pulmonary effort is normal.   Chest:       Abdominal:      General: Abdomen is flat.      Palpations: Abdomen is soft.   Musculoskeletal:         General: Normal range of motion.      Cervical back: Normal range of motion.   Skin:     General: Skin is warm and dry.   Neurological:      General: No focal deficit present.      Mental Status: She is alert. Mental status is at baseline.           ASSESSMENT/PLAN:  Lola was seen today for breast cancer.    Diagnoses and all orders for this visit:    Invasive lobular carcinoma of left breast in female  -     Cancel: Ambulatory referral/consult to Radiation Oncology; Future      The patient is recommended adjuvant radiation therapy to the left breast, IMN and supraclavicular fossa.  She will receive a dose of 42.4 Gy to the breast followed by a 12-15 Gy boost to the lumpectomy cavity.  Treatment will be delivered over a period of 4 weeks.    The risks and benefits of treatment have been discussed with the patient and she expressed full understanding. she understands the treatment plan and willing to proceed accordingly.    I spent approximately 60 minutes reviewing the available records and evaluating the patient, out of which over 50% of the time was spent face to face with the patient in counseling and coordinating this patient's care.

## 2022-12-08 ENCOUNTER — IMMUNIZATION (OUTPATIENT)
Dept: PRIMARY CARE CLINIC | Facility: CLINIC | Age: 82
End: 2022-12-08
Payer: MEDICARE

## 2022-12-08 DIAGNOSIS — Z23 NEED FOR VACCINATION: Primary | ICD-10-CM

## 2022-12-08 PROCEDURE — 91313 COVID-19, MRNA, LNP-S, BIVALENT BOOSTER, PF, 50 MCG/0.5 ML: CPT | Mod: PBBFAC | Performed by: INTERNAL MEDICINE

## 2022-12-08 PROCEDURE — 0134A COVID-19, MRNA, LNP-S, BIVALENT BOOSTER, PF, 50 MCG/0.5 ML: CPT | Mod: CV19,PBBFAC | Performed by: INTERNAL MEDICINE

## 2022-12-14 ENCOUNTER — HOSPITAL ENCOUNTER (OUTPATIENT)
Dept: RADIATION THERAPY | Facility: HOSPITAL | Age: 82
Discharge: HOME | End: 2022-12-14
Attending: RADIOLOGY
Payer: MEDICARE

## 2022-12-14 ENCOUNTER — OFFICE VISIT (OUTPATIENT)
Dept: URGENT CARE | Facility: CLINIC | Age: 82
End: 2022-12-14
Payer: MEDICARE

## 2022-12-14 VITALS
TEMPERATURE: 98 F | HEART RATE: 86 BPM | HEIGHT: 67 IN | WEIGHT: 160 LBS | BODY MASS INDEX: 25.11 KG/M2 | SYSTOLIC BLOOD PRESSURE: 124 MMHG | RESPIRATION RATE: 18 BRPM | OXYGEN SATURATION: 96 % | DIASTOLIC BLOOD PRESSURE: 72 MMHG

## 2022-12-14 DIAGNOSIS — J06.9 VIRAL URI WITH COUGH: Primary | ICD-10-CM

## 2022-12-14 DIAGNOSIS — R05.9 COUGH, UNSPECIFIED TYPE: ICD-10-CM

## 2022-12-14 LAB
CTP QC/QA: YES
POC MOLECULAR INFLUENZA A AGN: NEGATIVE
POC MOLECULAR INFLUENZA B AGN: NEGATIVE

## 2022-12-14 PROCEDURE — 77263 PR  RADIATION THERAPY PLAN COMPLEX: ICD-10-PCS | Mod: ,,, | Performed by: RADIOLOGY

## 2022-12-14 PROCEDURE — 99213 OFFICE O/P EST LOW 20 MIN: CPT | Mod: S$GLB,,, | Performed by: FAMILY MEDICINE

## 2022-12-14 PROCEDURE — 77332 PR  RADN TREATMENT AID(S) SIMPLE: ICD-10-PCS | Mod: 26,,, | Performed by: RADIOLOGY

## 2022-12-14 PROCEDURE — 99213 PR OFFICE/OUTPT VISIT, EST, LEVL III, 20-29 MIN: ICD-10-PCS | Mod: S$GLB,,, | Performed by: FAMILY MEDICINE

## 2022-12-14 PROCEDURE — 77290 PR  SET RADN THERAPY FIELD COMPLEX: ICD-10-PCS | Mod: 26,,, | Performed by: RADIOLOGY

## 2022-12-14 PROCEDURE — 77014 HC CT GUIDANCE RADIATION THERAPY FLDS PLACEMENT: CPT | Mod: TC | Performed by: RADIOLOGY

## 2022-12-14 PROCEDURE — 87502 INFLUENZA DNA AMP PROBE: CPT | Mod: QW,S$GLB,, | Performed by: FAMILY MEDICINE

## 2022-12-14 PROCEDURE — 77332 RADIATION TREATMENT AID(S): CPT | Mod: TC | Performed by: RADIOLOGY

## 2022-12-14 PROCEDURE — 77263 THER RADIOLOGY TX PLNG CPLX: CPT | Mod: ,,, | Performed by: RADIOLOGY

## 2022-12-14 PROCEDURE — 77290 THER RAD SIMULAJ FIELD CPLX: CPT | Mod: TC | Performed by: RADIOLOGY

## 2022-12-14 PROCEDURE — 77332 RADIATION TREATMENT AID(S): CPT | Mod: 26,,, | Performed by: RADIOLOGY

## 2022-12-14 PROCEDURE — 77290 THER RAD SIMULAJ FIELD CPLX: CPT | Mod: 26,,, | Performed by: RADIOLOGY

## 2022-12-14 PROCEDURE — 87502 POCT INFLUENZA A/B MOLECULAR: ICD-10-PCS | Mod: QW,S$GLB,, | Performed by: FAMILY MEDICINE

## 2022-12-14 RX ORDER — BENZONATATE 100 MG/1
100 CAPSULE ORAL 3 TIMES DAILY PRN
Qty: 30 CAPSULE | Refills: 0 | Status: SHIPPED | OUTPATIENT
Start: 2022-12-14 | End: 2022-12-24

## 2022-12-14 RX ORDER — FLUTICASONE PROPIONATE 50 MCG
1 SPRAY, SUSPENSION (ML) NASAL DAILY
Qty: 16 G | Refills: 0 | Status: SHIPPED | OUTPATIENT
Start: 2022-12-14

## 2022-12-14 RX ORDER — GUAIFENESIN 600 MG/1
1200 TABLET, EXTENDED RELEASE ORAL 2 TIMES DAILY
Qty: 40 TABLET | Refills: 0 | Status: SHIPPED | OUTPATIENT
Start: 2022-12-14 | End: 2022-12-24

## 2022-12-14 RX ORDER — CETIRIZINE HYDROCHLORIDE 10 MG/1
10 TABLET ORAL DAILY
Qty: 30 TABLET | Refills: 0 | Status: SHIPPED | OUTPATIENT
Start: 2022-12-14 | End: 2023-08-07

## 2022-12-14 NOTE — PROGRESS NOTES
"Subjective:       Patient ID: Lola Lerner is a 82 y.o. female.    Vitals:  height is 5' 7" (1.702 m) and weight is 72.6 kg (160 lb). Her oral temperature is 98 °F (36.7 °C). Her blood pressure is 124/72 and her pulse is 86. Her respiration is 18 and oxygen saturation is 96%.     Chief Complaint: Cough    Pt is coming with a cough and congestion pt noticed symptoms on yesterday with a rummy nose. Pt says she having a wet cough. Pt took OTC nasal spray with mild relief.   Provider note begins below:  Past Medical History:  No date: Cancer of kidney  No date: Hyperlipidemia  No date: Hypertension  10/03/2022: Invasive lobular carcinoma of breast, stage 2, left  No date: Urge incontinence      Comment:  sees Dr. Julien, urology  No date: Vaginal delivery      Comment:  x3   Pt with above pmh presents with c/o cough that started yesterday and runny nose. No fever or chills. No cp or SOB. No GI related symptoms, including, N/v/D or constipation. No anosmia or ageusia.        Cough  This is a new problem. The current episode started yesterday. The problem has been gradually worsening. The problem occurs every few minutes. The cough is Productive of sputum. Associated symptoms include headaches, nasal congestion and a sore throat. Pertinent negatives include no chest pain, chills, ear congestion, ear pain, fever, heartburn, hemoptysis, myalgias, postnasal drip, rash, rhinorrhea, shortness of breath, sweats, weight loss or wheezing. Nothing aggravates the symptoms. The treatment provided mild relief. There is no history of asthma, bronchiectasis, bronchitis, COPD, emphysema, environmental allergies or pneumonia.     Constitution: Negative for activity change, appetite change, chills, sweating, fatigue, fever and generalized weakness.   HENT:  Positive for sore throat. Negative for ear pain, ear discharge, foreign body in ear, congestion, postnasal drip, sinus pain, sinus pressure, trouble swallowing and voice " change.    Neck: Negative for neck pain and neck stiffness.   Cardiovascular:  Negative for chest pain, leg swelling and palpitations.   Respiratory:  Positive for cough. Negative for chest tightness, sputum production, bloody sputum, COPD, shortness of breath, stridor, wheezing and asthma.    Gastrointestinal:  Negative for heartburn.   Musculoskeletal:  Negative for muscle ache.   Skin:  Negative for rash.   Allergic/Immunologic: Negative for environmental allergies and asthma.   Neurological:  Positive for headaches.     Objective:      Physical Exam   Constitutional: She is oriented to person, place, and time. She appears well-developed.  Non-toxic appearance. She does not appear ill. No distress.      Comments: present     HENT:   Head: Normocephalic and atraumatic.   Ears:   Right Ear: External ear normal.   Left Ear: External ear normal.   Nose: Nose normal.   Mouth/Throat: Oropharynx is clear and moist.   Eyes: Conjunctivae, EOM and lids are normal. Pupils are equal, round, and reactive to light.   Neck: Trachea normal and phonation normal. Neck supple.   Cardiovascular: S1 normal and S2 normal.   Pulmonary/Chest: Effort normal and breath sounds normal.   Musculoskeletal: Normal range of motion.         General: Normal range of motion.   Neurological: She is alert and oriented to person, place, and time.   Skin: Skin is warm, dry, intact and not diaphoretic.   Psychiatric: Her speech is normal and behavior is normal. Judgment and thought content normal.   Nursing note and vitals reviewed.      Assessment:       1. Viral URI with cough    2. Cough, unspecified type          Results for orders placed or performed in visit on 12/14/22   POCT Influenza A/B MOLECULAR   Result Value Ref Range    POC Molecular Influenza A Ag Negative Negative, Not Reported    POC Molecular Influenza B Ag Negative Negative, Not Reported     Acceptable Yes       Plan:       Flu neg, lungs ctab, vss  Otc meds  reviewed    Discussed results/diagnosis/plan with patient in clinic. Strict precautions given to patient to monitor for worsening signs and symptoms. Advised to follow up with PCP or specialist.    Explained side effects of medications prescribed with patient and informed him/her to discontinue use if he/she has any side effects and to inform UC or PCP if this occurs. All questions answered. Strict ED verses clinic return precautions stressed and given in depth. Advised if symptoms worsens of fail to improve he/she should go to the Emergency Room. Discharge and follow-up instructions given verbally/printed with the patient who expressed understanding and willingness to comply with my recommendations. Patient voiced understanding and in agreement with current treatment plan. Patient exits the exam room in no acute distress. Conversant and engaged during discharge discussion, verbalized understanding.      Viral URI with cough  -     guaiFENesin (MUCINEX) 600 mg 12 hr tablet; Take 2 tablets (1,200 mg total) by mouth 2 (two) times daily. for 10 days  Dispense: 40 tablet; Refill: 0  -     fluticasone propionate (FLONASE) 50 mcg/actuation nasal spray; 1 spray (50 mcg total) by Each Nostril route once daily.  Dispense: 16 g; Refill: 0  -     cetirizine (ZYRTEC) 10 MG tablet; Take 1 tablet (10 mg total) by mouth once daily.  Dispense: 30 tablet; Refill: 0  -     benzonatate (TESSALON) 100 MG capsule; Take 1 capsule (100 mg total) by mouth 3 (three) times daily as needed for Cough.  Dispense: 30 capsule; Refill: 0    Cough, unspecified type  -     POCT Influenza A/B MOLECULAR              Additional MDM:     Heart Failure Score:   COPD = No    Patient Instructions   General Discharge Instructions   PLEASE READ YOUR DISCHARGE INSTRUCTIONS ENTIRELY AS IT CONTAINS IMPORTANT INFORMATION.  If you were prescribed a narcotic or controlled medication, do not drive or operate heavy equipment or machinery while taking these  medications.  If you were prescribed antibiotics, please take them to completion.  You must understand that you've received an Urgent Care treatment only and that you may be released before all your medical problems are known or treated. You, the patient, will arrange for follow up care as instructed.    OVER THE COUNTER RECOMMENDATIONS/SUGGESTIONS.    Make sure to stay well hydrated.    Use Nasal Saline to mechanically move any post nasal drip from your eustachian tube or from the back of your throat.    Use warm salt water gargles to ease your throat pain. Warm salt water gargles as needed for sore throat- 1/2 tsp salt to 1 cup warm water, gargle as desired.    Use an antihistamine such as Claritin, Zyrtec or Allegra to dry you out.    Use pseudoephedrine (behind the counter) to decongest. Pseudoephedrine 30 mg up to 240 mg /day. It can raise your blood pressure and give you palpitations.    Use mucinex (guaifenesin) to break up mucous up to 2400mg/day to loosen any mucous.    The mucinex DM pill has a cough suppressant that can be sedating. It can be used at night to stop the tickle at the back of your throat.    You can use Mucinex D (it has guaifenesin and a high dose of pseudoephedrine) in the mornings to help decongest.    Use Afrin in each nare for no longer than 3 days, as it is addictive. It can also dry out your mucous membranes and cause elevated blood pressure. This is especially useful if you are flying.    Use Flonase 1-2 sprays/nostril per day. It is a local acting steroid nasal spray, if you develop a bloody nose, stop using the medication immediately.    Sometimes Nyquil at night is beneficial to help you get some rest, however it is sedating and it does have an antihistamine, and tylenol.    Honey is a natural cough suppressant that can be used.    Tylenol up to 4,000 mg a day is safe for short periods and can be used for body aches, pain, and fever. However in high doses and prolonged use it can  cause liver irritation.    Ibuprofen is a non-steroidal anti-inflammatory that can be used for body aches, pain, and fever.However it can also cause stomach irritation if over used.     Follow up with your PCP or specialty clinic as instructed in the next 2-3 days if not improved or as needed. You can call [phone] to schedule an appointment with appropriate provider.      If you condition worsens, we recommend that you receive another evaluation at the emergency room immediately or contact your primary medical clinic's after hours call service to discuss your concerns.      Please return here or go to the Emergency Department for any concerns or worsening condition.   You can also call [phone] to schedule an appointment with the appropriate provider.    Please return here or go to the Emergency Department for any concerns or worsening of condition.    Thank you for choosing [facility]!    Our goal in the Urgent Care is to always provide outstanding medical care. You may receive a survey by mail or e-mail in the next week regarding your experience today. We would greatly appreciate you completing and returning the survey. Your feedback provides us with a way to recognize our staff who provide very good care, and it helps us learn how to improve when your experience was below our aspiration of excellence.      We appreciate you trusting us with your medical care. We hope you feel better soon. We will be happy to take care of you for all of your future medical needs.    Sincerely,    PATI Perales  Patient Instructions   PLEASE READ YOUR DISCHARGE INSTRUCTIONS ENTIRELY AS IT CONTAINS IMPORTANT INFORMATION.        Please drink plenty of fluids. You body needs increased water but other beverages may aid in comfort.  You will know that you have had enough water to be hydrated when your urine is clear or at least a very pale yellow. Nasal saline may help with removal of mucus as well.   "Ibuprofen is preferred for aches and pains as well as fever reduction. If you do not have high blood pressure, then you may use a decongestant such as pseudoephedrine or one of the above medications that have the letter, "-D" following it.  Hot tea with honey can help with sore throats as the heat with reduce the inflammation and the honey will coat your throat to help it feel better. Prescription pain medicine should be used for the significant throat sxs you are experiencing. Do not drive after taking this medication.     Lastly, good hand washing and cough hygiene (cough into your elbow) will help prevent the spread of the illness.  A general rule is that you are no longer contagious once you have been without a fever for over 24 hours without requiring fever reducing medications.   Please get plenty of rest.     Please return here or go to the Emergency Department for any concerns or worsening of condition.     Please take an over the counter antihistamine medication (allegra/Claritin/Zyrtec) of your choice as directed, they may help with some of the runny nose symptoms if you are having them.       Can continue mucinex. Use mucinex (guaifenisin) to break up mucous up to 2400mg/day to loosen any mucous. The mucinex DM pill has a cough suppressant that can be used at night to stop the tickle at the back of your throat. You may use Mucinex to help thin thick secretions to allow you to expel them but it only works if you drink more water.     If not allergic, please take over the counter Tylenol (Acetaminophen) and/or Motrin (Ibuprofen) as directed for control of pain and/or fever.  Please follow up with your primary care doctor or specialist as needed.     Sore throat recommendations: Warm fluids, warm salt water gargles, throat lozenges, tea, honey, soup, rest, hydration.     Use over the counter flonase: one spray each nostril twice daily OR two sprays each nostril once daily.      Sinus rinses DO NOT USE TAP " WATER, if you must, water must be a rolling boil for 1 minute, let it cool, then use.  May use distilled water, or over the counter nasal saline rinses.  Vics vapor rub in shower to help open nasal passages.  May use nasal gel to keep passages moisturized.  May use Nasal saline sprays during the day for added relief of congestion.   For those who go to the gym, please do not use the sauna or steam room now to clear sinuses.     If you  smoke, please stop smoking.        Please return or see your primary care doctor if you develop new or worsening symptoms.      Please arrange follow up with your primary medical clinic as soon as possible. You must understand that you've received an Urgent Care treatment only and that you may be released before all of your medical problems are known or treated. You, the patient, will arrange for follow up as instructed. If your symptoms worsen or fail to improve you should go to the Emergency Room.

## 2022-12-14 NOTE — PATIENT INSTRUCTIONS
General Discharge Instructions   PLEASE READ YOUR DISCHARGE INSTRUCTIONS ENTIRELY AS IT CONTAINS IMPORTANT INFORMATION.  If you were prescribed a narcotic or controlled medication, do not drive or operate heavy equipment or machinery while taking these medications.  If you were prescribed antibiotics, please take them to completion.  You must understand that you've received an Urgent Care treatment only and that you may be released before all your medical problems are known or treated. You, the patient, will arrange for follow up care as instructed.    OVER THE COUNTER RECOMMENDATIONS/SUGGESTIONS.    Make sure to stay well hydrated.    Use Nasal Saline to mechanically move any post nasal drip from your eustachian tube or from the back of your throat.    Use warm salt water gargles to ease your throat pain. Warm salt water gargles as needed for sore throat- 1/2 tsp salt to 1 cup warm water, gargle as desired.    Use an antihistamine such as Claritin, Zyrtec or Allegra to dry you out.    Use pseudoephedrine (behind the counter) to decongest. Pseudoephedrine 30 mg up to 240 mg /day. It can raise your blood pressure and give you palpitations.    Use mucinex (guaifenesin) to break up mucous up to 2400mg/day to loosen any mucous.    The mucinex DM pill has a cough suppressant that can be sedating. It can be used at night to stop the tickle at the back of your throat.    You can use Mucinex D (it has guaifenesin and a high dose of pseudoephedrine) in the mornings to help decongest.    Use Afrin in each nare for no longer than 3 days, as it is addictive. It can also dry out your mucous membranes and cause elevated blood pressure. This is especially useful if you are flying.    Use Flonase 1-2 sprays/nostril per day. It is a local acting steroid nasal spray, if you develop a bloody nose, stop using the medication immediately.    Sometimes Nyquil at night is beneficial to help you get some rest, however it is sedating and it  does have an antihistamine, and tylenol.    Honey is a natural cough suppressant that can be used.    Tylenol up to 4,000 mg a day is safe for short periods and can be used for body aches, pain, and fever. However in high doses and prolonged use it can cause liver irritation.    Ibuprofen is a non-steroidal anti-inflammatory that can be used for body aches, pain, and fever.However it can also cause stomach irritation if over used.     Follow up with your PCP or specialty clinic as instructed in the next 2-3 days if not improved or as needed. You can call [phone] to schedule an appointment with appropriate provider.      If you condition worsens, we recommend that you receive another evaluation at the emergency room immediately or contact your primary medical clinic's after hours call service to discuss your concerns.      Please return here or go to the Emergency Department for any concerns or worsening condition.   You can also call [phone] to schedule an appointment with the appropriate provider.    Please return here or go to the Emergency Department for any concerns or worsening of condition.    Thank you for choosing [facility]!    Our goal in the Urgent Care is to always provide outstanding medical care. You may receive a survey by mail or e-mail in the next week regarding your experience today. We would greatly appreciate you completing and returning the survey. Your feedback provides us with a way to recognize our staff who provide very good care, and it helps us learn how to improve when your experience was below our aspiration of excellence.      We appreciate you trusting us with your medical care. We hope you feel better soon. We will be happy to take care of you for all of your future medical needs.    Sincerely,    PATI Perales  Patient Instructions   PLEASE READ YOUR DISCHARGE INSTRUCTIONS ENTIRELY AS IT CONTAINS IMPORTANT INFORMATION.        Please drink plenty of  "fluids. You body needs increased water but other beverages may aid in comfort.  You will know that you have had enough water to be hydrated when your urine is clear or at least a very pale yellow. Nasal saline may help with removal of mucus as well.  Ibuprofen is preferred for aches and pains as well as fever reduction. If you do not have high blood pressure, then you may use a decongestant such as pseudoephedrine or one of the above medications that have the letter, "-D" following it.  Hot tea with honey can help with sore throats as the heat with reduce the inflammation and the honey will coat your throat to help it feel better. Prescription pain medicine should be used for the significant throat sxs you are experiencing. Do not drive after taking this medication.     Lastly, good hand washing and cough hygiene (cough into your elbow) will help prevent the spread of the illness.  A general rule is that you are no longer contagious once you have been without a fever for over 24 hours without requiring fever reducing medications.   Please get plenty of rest.     Please return here or go to the Emergency Department for any concerns or worsening of condition.     Please take an over the counter antihistamine medication (allegra/Claritin/Zyrtec) of your choice as directed, they may help with some of the runny nose symptoms if you are having them.       Can continue mucinex. Use mucinex (guaifenisin) to break up mucous up to 2400mg/day to loosen any mucous. The mucinex DM pill has a cough suppressant that can be used at night to stop the tickle at the back of your throat. You may use Mucinex to help thin thick secretions to allow you to expel them but it only works if you drink more water.     If not allergic, please take over the counter Tylenol (Acetaminophen) and/or Motrin (Ibuprofen) as directed for control of pain and/or fever.  Please follow up with your primary care doctor or specialist as needed.     Sore throat " recommendations: Warm fluids, warm salt water gargles, throat lozenges, tea, honey, soup, rest, hydration.     Use over the counter flonase: one spray each nostril twice daily OR two sprays each nostril once daily.      Sinus rinses DO NOT USE TAP WATER, if you must, water must be a rolling boil for 1 minute, let it cool, then use.  May use distilled water, or over the counter nasal saline rinses.  Vics vapor rub in shower to help open nasal passages.  May use nasal gel to keep passages moisturized.  May use Nasal saline sprays during the day for added relief of congestion.   For those who go to the gym, please do not use the sauna or steam room now to clear sinuses.     If you  smoke, please stop smoking.        Please return or see your primary care doctor if you develop new or worsening symptoms.      Please arrange follow up with your primary medical clinic as soon as possible. You must understand that you've received an Urgent Care treatment only and that you may be released before all of your medical problems are known or treated. You, the patient, will arrange for follow up as instructed. If your symptoms worsen or fail to improve you should go to the Emergency Room.

## 2022-12-23 PROCEDURE — 77295 3-D RADIOTHERAPY PLAN: CPT | Mod: 26,,, | Performed by: RADIOLOGY

## 2022-12-23 PROCEDURE — 77300 RADIATION THERAPY DOSE PLAN: CPT | Mod: TC | Performed by: RADIOLOGY

## 2022-12-23 PROCEDURE — 77334 RADIATION TREATMENT AID(S): CPT | Mod: TC | Performed by: RADIOLOGY

## 2022-12-23 PROCEDURE — 77334 PR  RADN TREATMENT AID(S) COMPLX: ICD-10-PCS | Mod: 26,,, | Performed by: RADIOLOGY

## 2022-12-23 PROCEDURE — 77300 RADIATION THERAPY DOSE PLAN: CPT | Mod: 26,,, | Performed by: RADIOLOGY

## 2022-12-23 PROCEDURE — 77295 PR 3D RADIOTHERAPY PLAN: ICD-10-PCS | Mod: 26,,, | Performed by: RADIOLOGY

## 2022-12-23 PROCEDURE — 77295 3-D RADIOTHERAPY PLAN: CPT | Mod: TC | Performed by: RADIOLOGY

## 2022-12-23 PROCEDURE — 77300 PR RADIATION THERAPY,DOSIMETRY PLAN: ICD-10-PCS | Mod: 26,,, | Performed by: RADIOLOGY

## 2022-12-23 PROCEDURE — 77334 RADIATION TREATMENT AID(S): CPT | Mod: 26,,, | Performed by: RADIOLOGY

## 2022-12-27 PROCEDURE — 77280 THER RAD SIMULAJ FIELD SMPL: CPT | Mod: TC | Performed by: RADIOLOGY

## 2022-12-27 PROCEDURE — 77280 PR  SET RADN THERAPY FIELD SIMPLE: ICD-10-PCS | Mod: 26,,, | Performed by: RADIOLOGY

## 2022-12-27 PROCEDURE — 77280 THER RAD SIMULAJ FIELD SMPL: CPT | Mod: 26,,, | Performed by: RADIOLOGY

## 2022-12-29 PROCEDURE — 77412 RADIATION TX DELIVERY LVL 3: CPT | Performed by: RADIOLOGY

## 2022-12-29 PROCEDURE — G6002 PR STEREOSCOPIC XRAY GUIDE FOR RADIATION TX DELIV: ICD-10-PCS | Mod: 26,,, | Performed by: RADIOLOGY

## 2022-12-29 PROCEDURE — G6002 STEREOSCOPIC X-RAY GUIDANCE: HCPCS | Mod: 26,,, | Performed by: RADIOLOGY

## 2022-12-29 PROCEDURE — 77387 GUIDANCE FOR RADJ TX DLVR: CPT | Mod: TC | Performed by: RADIOLOGY

## 2022-12-30 PROCEDURE — G6002 STEREOSCOPIC X-RAY GUIDANCE: HCPCS | Mod: 26,,, | Performed by: RADIOLOGY

## 2022-12-30 PROCEDURE — 77387 GUIDANCE FOR RADJ TX DLVR: CPT | Mod: TC | Performed by: RADIOLOGY

## 2022-12-30 PROCEDURE — G6002 PR STEREOSCOPIC XRAY GUIDE FOR RADIATION TX DELIV: ICD-10-PCS | Mod: 26,,, | Performed by: RADIOLOGY

## 2022-12-30 PROCEDURE — 77412 RADIATION TX DELIVERY LVL 3: CPT | Performed by: RADIOLOGY

## 2023-01-03 ENCOUNTER — HOSPITAL ENCOUNTER (OUTPATIENT)
Dept: RADIATION THERAPY | Facility: HOSPITAL | Age: 83
Discharge: HOME | End: 2023-01-03
Attending: RADIOLOGY
Payer: MEDICARE

## 2023-01-03 ENCOUNTER — OFFICE VISIT (OUTPATIENT)
Dept: HEMATOLOGY/ONCOLOGY | Facility: CLINIC | Age: 83
End: 2023-01-03
Payer: MEDICARE

## 2023-01-03 VITALS
WEIGHT: 153.81 LBS | BODY MASS INDEX: 24.14 KG/M2 | HEIGHT: 67 IN | RESPIRATION RATE: 17 BRPM | HEART RATE: 82 BPM | DIASTOLIC BLOOD PRESSURE: 63 MMHG | TEMPERATURE: 98 F | SYSTOLIC BLOOD PRESSURE: 144 MMHG | OXYGEN SATURATION: 99 %

## 2023-01-03 DIAGNOSIS — C50.912 INVASIVE LOBULAR CARCINOMA OF LEFT BREAST IN FEMALE: Primary | ICD-10-CM

## 2023-01-03 DIAGNOSIS — Z85.528 HISTORY OF RENAL CELL CANCER: ICD-10-CM

## 2023-01-03 PROCEDURE — 99214 OFFICE O/P EST MOD 30 MIN: CPT | Mod: S$PBB,,, | Performed by: INTERNAL MEDICINE

## 2023-01-03 PROCEDURE — 99214 OFFICE O/P EST MOD 30 MIN: CPT | Mod: PBBFAC,25 | Performed by: INTERNAL MEDICINE

## 2023-01-03 PROCEDURE — 99214 PR OFFICE/OUTPT VISIT, EST, LEVL IV, 30-39 MIN: ICD-10-PCS | Mod: S$PBB,,, | Performed by: INTERNAL MEDICINE

## 2023-01-03 PROCEDURE — G6002 STEREOSCOPIC X-RAY GUIDANCE: HCPCS | Mod: 26,,, | Performed by: RADIOLOGY

## 2023-01-03 PROCEDURE — 77387 GUIDANCE FOR RADJ TX DLVR: CPT | Mod: TC | Performed by: RADIOLOGY

## 2023-01-03 PROCEDURE — 99999 PR PBB SHADOW E&M-EST. PATIENT-LVL IV: CPT | Mod: PBBFAC,,, | Performed by: INTERNAL MEDICINE

## 2023-01-03 PROCEDURE — 99999 PR PBB SHADOW E&M-EST. PATIENT-LVL IV: ICD-10-PCS | Mod: PBBFAC,,, | Performed by: INTERNAL MEDICINE

## 2023-01-03 PROCEDURE — 77412 RADIATION TX DELIVERY LVL 3: CPT | Performed by: RADIOLOGY

## 2023-01-03 PROCEDURE — G6002 PR STEREOSCOPIC XRAY GUIDE FOR RADIATION TX DELIV: ICD-10-PCS | Mod: 26,,, | Performed by: RADIOLOGY

## 2023-01-03 NOTE — PROGRESS NOTES
Subjective:       Patient ID: Lola Lerner is a 82 y.o. female.    Chief Complaint: Invasive lobular carcinoma of breast, stage 2, left    HPI    Reports feeling well overall  Radiation therapy going well- has completed 3 treatments to date  Reports no fatigue  Eating well and weight stable    Presents for follow up of invasive lobular breast cancer diagnosis after completion of surgery  pT3 pN1mi      Oncology History:  - self detected a lump in her left breast about one month prior. She has not noted any pain, skin changes, or nipple discharge. She did report that her left nipple had changed somewhat (may be more inverted now). S     - she was up to date with screening mammograms up until 2018. She required a biopsy once for a suspicious finding on mammogram but this was ultimately benign.      - 9/14/2022 Diagnostic Mammogram:  Findings:  This procedure was performed using tomosynthesis. Computer-aided detection was utilized in the interpretation of this examination.  The breasts have scattered areas of fibroglandular density.   Mammo Digital Diagnostic Bilat with Jovanny  Left  Developing Asymmetry: There is a developing asymmetry seen in the upper inner quadrant of the left breast. The asymmetry correlates with the palpable mass and skin changes reported by the patient.   Right  There is no evidence of suspicious masses, calcifications, or other abnormal findings in the right breast.  US Breast Left Limited  Left  Mass: There is a 26 mm x 33 mm x 29 mm irregularly shaped, non-parallel, hypoechoic mass with indistinct margins with shadowing seen in the upper inner quadrant of the left breast at 10 o'clock, 2 cm from the nipple. The mass correlates with the palpable mass reported by the patient.    Targeted ultrasound of the left axilla is normal.  Impression:  Left  Mass: Left breast 26 mm x 33 mm x 29 mm mass at the upper inner 10 o'clock position. Assessment: 4A - Suspicious finding. Biopsy is recommended.    Right  There is no mammographic or sonographic evidence of malignancy in the right breast.  BI-RADS Category:   Overall: 4A - Low Suspicion for Malignancy  Recommendation:  Biopsy is recommended. I discussed these findings and recommendations with the patient and her  in detail at the time of exam.     - 9/20/2022 Breast Biopsy:  Breast, left, ultrasound-guided core needle biopsies of mass:   - Invasive lobular carcinoma       - Present in all sampled cores (6 mm in greatest contiguous dimension)       - Aparna-Pal modified SBR score 3 + 2 + 1 = 6 of 9       - Histologic grade 2 of 3       - Mitotic index = 0.1 (average mitoses per high power field) (low)   - Biomarkers, assessed by immunohistochemistry on block 1A       - Estrogen Receptor (ER):  Positive (90%; strong intensity)       - Progesterone Receptor (KS):  Positive (70%; strong intensity)       - HER2:  Equivocal (2+)       - HER2 FISH pending; results to follow in a supplemental report       - Ki-67 proliferation index:  Averages 5-10%   - Lymphovascular invasion is not identified      - 9/29/2022 Unable to tolerate Breast MRI     - 10/19/2022 Lumpectomy and [address] LN assessment with Dr. Spencer  Pathology:  1. LEFT BREAST TO INCLUDE A SEGMENT OF SKIN WITHOUT NIPPLE, PARTIAL   MASTECTOMY:   -  Extensive invasive pleomorphic lobular carcinoma, Ellabell histologic   grade 2, with focal lymphovascular invasion and focal microcalcifications;   tumor size = 72 mm.  The invasive carcinoma directly extends into the   anterior, inferior, superior, medial, and lateral margins.  The invasive   carcinoma is 1.9 mm from the posterior margin.  Please note that the anterior   margin is the only final margin on this specimen.  The skin is free of tumor.     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   -  Focal atypical ductal hyperplasia.   -  Focal flat  epithelial atypia.   -  Multifocal complex sclerosing lesions with focal florid usual ductal   hyperplasia and focal intraductal micropapillomas.   -  Small fibroadenoma, tumor size = 4 mm.   -  Fibrocystic changes with columnar cell change, columnar cell hyperplasia,   adenosis with blunt duct adenosis and sclerosing adenosis, usual ductal   hyperplasia, apocrine metaplasia, microcysts, stromal fibrosis, and   microcalcifications.   -  Focal duct ectasia.   -  Moderate-to-marked arteriosclerosis.   -  Organizing biopsy site with fat necrosis, fibrosis and chronic   inflammation.   2. LEFT BREAST, ADDITIONAL SUPERIOR MARGIN, EXCISION:   -  No evidence of malignancy; there is no invasive lobular carcinoma or   lobular carcinoma in situ.   -  No breast ducts or lobules are present for evaluation.   3. LEFT BREAST, ADDITIONAL INFERIOR MARGIN, EXCISION:   -  Invasive pleomorphic lobular carcinoma, Huyen histologic grade 2,   with multifocal lymphovascular invasion.  The invasive carcinoma focally   extends into the new inferior margin.   -  Focal lobular carcinoma in situ with duct extension.   -  Focal complex sclerosing lesion.   -  Fibrocystic changes with columnar cell change, columnar cell hyperplasia,   usual ductal hyperplasia, adenosis with blunt duct adenosis, apocrine   metaplasia, microcysts, stromal fibrosis and focal microcalcifications.   -  Focal duct ectasia.   4. LEFT BREAST, ADDITIONAL MEDIAL MARGIN, EXCISION:   -  Invasive pleomorphic lobular carcinoma, [place] histologic grade 2,   with focal lymphovascular invasion.  The new medial margin is free of tumor   and the invasive carcinoma is 0.9 mm from the new medial margin.   -  Focal lobular carcinoma in situ.   -  Fibrocystic changes with columnar cell change, usual ductal hyperplasia,   and stromal fibrosis.   5. LEFT BREAST, ADDITIONAL LATERAL MARGIN, EXCISION:   -  No evidence of malignancy; there is no residual invasive lobular carcinoma    or lobular carcinoma in situ.   -  Focal mild fibrocystic changes with columnar cell change, adenosis, and   stromal fibrosis.   6. LEFT BREAST, ADDITIONAL POSTERIOR MARGIN, EXCISION:   -  No evidence of malignancy; there is no residual invasive lobular carcinoma   or lobular carcinoma in situ.   -  No breast ducts or lobules are present for evaluation.   7. LEFT AXILLARY SENTINEL LYMPH NODES (5), EXCISIONAL BIOPSY:   -  1 out of 5 lymph nodes demonstrates micrometastatic pleomorphic lobular   carcinoma; size of metastatic deposit = 1.4 mm.  There is no evidence of   extranodal extension.   -  Mild reactive lymphoid hyperplasia, mixed pattern.   -  Mild lipomatosis.   INVASIVE CARCINOMA OF THE BREAST, RESECTION, CANCER CASE SUMMARY:   PROCEDURE:  Partial mastectomy/lumpectomy with segment of skin without nipple.   SPECIMEN LATERALITY:  Left.   TUMOR SITE:  Not specified.   TUMOR, HISTOLOGIC TYPE:  Invasive pleomorphic lobular carcinoma.   TUMOR, HISTOLOGIC GRADE (ANTONIO HISTOLOGIC SCORE):        Glandular (acinar)/tubular differentiation:  Score 3.        Nuclear pleomorphism:  Score 2.        Mitotic rate:  Score 1.        Overall grade:  Grade 2 (score 6).   TUMOR SIZE:  72 mm.   TUMOR FOCALITY:  Can not be determined.   DUCTAL CARCINOMA IN SITU (DCIS):  Not identified.   TUMOR EXTENT:        Skin:  Skin is present and uninvolved.        Nipple and skeletal muscle:  Not applicable (nipple and skeletal muscle   are absent).   LYMPHOVASCULAR INVASION:  Present.   DERMAL LYMPHOVASCULAR INVASION:  Not identified.   MICROCALCIFICATIONS:  Present in invasive carcinoma, LCIS, and non-neoplastic   tissue.   TREATMENT EFFECT IN THE BREAST:  No known presurgical therapy.   MARGIN STATUS FOR INVASIVE CARCINOMA:        MARGINS INVOLVED BY INVASIVE CARCINOMA:  The anterior and inferior   margins are involved by invasive carcinoma.             Extent of involvement of anterior margin:  4 mm.             Extent of  involvement of inferior margin:  Less than 1 mm.        MARGINS UNINVOLVED BY INVASIVE CARCINOMA:  The medial, superior,   posterior and lateral margins are uninvolved by invasive carcinoma.             Distance from invasive carcinoma to medial margin:  0.9 mm.             Distance from invasive carcinoma to superior margin:  8 mm.             Distance from invasive carcinoma to posterior margin:  9.9 mm.             Distance from invasive carcinoma to lateral margin:  13 mm.   REGIONAL LYMPH NODE STATUS:  Regional lymph nodes present; tumor present in   regional lymph node.        Number of lymph nodes with macrometastasis:  0.        Number of lymph nodes with micrometastasis:  1.        Number of lymph nodes with isolated tumor cells:  0.        Size of largest willy metastatic deposit:  1.4 mm.        Extranodal extension:  Not identified.        Total number of lymph nodes examined (sentinel and nonsentinel):  5.        Number of sentinel nodes examined:  5.   DISTANT METASTASIS:  Not applicable.   PATHOLOGIC STAGE CLASSIFICATION (pTNM, AJCC 8TH EDITION):   pT3:  Tumor size = 72 mm (greater than 50 mm in greatest dimension).   pN1mi (sn):  Micrometastasis in 1 axillary sentinel lymph node.   pM: Not applicable.   ADDITIONAL FINDINGS:  Please see above final diagnoses on specimens 1-7.   BREAST BIOMARKER TESTING PERFORMED ON PREVIOUS BIOPSY, CASE [id number]   (interpreted by Dr. Esteban Grace):  Estrogen receptor (ER):  Positive   (90%; strong intensity)   Progesterone receptor (PGR):  Positive (70%; strong intensity).   HER2 by IHC:  Equivocal (2+).   HER2 by FISH:  Negative (performed at [facility]).   Ki-67 proliferation index:  Averages 5-10%.      GynHx:   Menarche around age 14 but unsure  Menopause in early 50s, unsure  A0  Very minimal breastfeeding     FH:  No personal history of breast cancer, no family history of breast cancer. No family history of prostate, endometrial, or ovarian cancer.       SH:    Never smoker, non drinker     PMH:          Active Ambulatory Problems     Diagnosis Date Noted    Hypertension 07/17/2013    Hyperlipidemia 07/17/2013    Recurrent UTI 04/28/2016    OAB (overactive bladder) 04/28/2016    Malignant neoplasm of right kidney 08/17/2016    Invasive lobular carcinoma of breast, stage 2, left 10/03/2022              Resolved Ambulatory Problems     Diagnosis Date Noted    No Resolved Ambulatory Problems              Past Medical History:   Diagnosis Date    Urge incontinence      Vaginal delivery       Review of Systems   Constitutional:  Negative for activity change, appetite change, chills, fatigue, fever and unexpected weight change.   HENT:  Negative for nasal congestion, mouth sores, postnasal drip, rhinorrhea, sinus pressure/congestion and trouble swallowing.    Eyes:  Negative for visual disturbance.   Respiratory:  Negative for cough and shortness of breath.    Cardiovascular:  Negative for chest pain, palpitations and leg swelling.   Gastrointestinal:  Negative for abdominal pain, diarrhea, nausea, vomiting and reflux.   Genitourinary:  Negative for bladder incontinence, difficulty urinating, dysuria and frequency.   Musculoskeletal:  Negative for arthralgias, back pain, gait problem, joint swelling and joint deformity.   Integumentary:  Negative for rash.   Neurological:  Negative for dizziness, weakness, light-headedness, numbness and headaches.   Hematological:  Negative for adenopathy. Does not bruise/bleed easily.   Psychiatric/Behavioral:  Negative for dysphoric mood and sleep disturbance. The patient is not nervous/anxious.        Objective:      Physical Exam  Vitals and nursing note reviewed.   Constitutional:       General: She is not in acute distress.     Appearance: Normal appearance. She is well-developed and normal weight. She is not ill-appearing.      Comments: ECOG= 0  Very pleasant  Presents with her    HENT:      Head:  Normocephalic and atraumatic.   Eyes:      General: No scleral icterus.        Right eye: No discharge.         Left eye: No discharge.      Extraocular Movements: Extraocular movements intact.      Conjunctiva/sclera: Conjunctivae normal.      Pupils: Pupils are equal, round, and reactive to light.      Right eye: Pupil is round and reactive.      Left eye: Pupil is round and reactive.   Neck:      Thyroid: No thyromegaly.      Vascular: No JVD.      Trachea: No tracheal deviation.   Cardiovascular:      Rate and Rhythm: Normal rate and regular rhythm.      Heart sounds: Normal heart sounds. No murmur heard.    No friction rub. No gallop.   Pulmonary:      Effort: Pulmonary effort is normal. No respiratory distress.      Breath sounds: Normal breath sounds. No wheezing or rales.   Abdominal:      General: Bowel sounds are normal. There is no distension.      Palpations: Abdomen is soft. There is no mass.      Tenderness: There is no abdominal tenderness. There is no guarding or rebound.   Musculoskeletal:         General: No swelling, tenderness or deformity. Normal range of motion.      Cervical back: Normal range of motion and neck supple.      Right lower leg: No edema.      Left lower leg: No edema.   Lymphadenopathy:      Head:      Right side of head: No submandibular adenopathy.      Left side of head: No submandibular adenopathy.      Cervical: No cervical adenopathy.      Right cervical: No superficial, deep or posterior cervical adenopathy.     Left cervical: No superficial, deep or posterior cervical adenopathy.      Upper Body:      Right upper body: No supraclavicular adenopathy.      Left upper body: No supraclavicular adenopathy.   Skin:     General: Skin is warm and dry.      Coloration: Skin is not jaundiced or pale.      Findings: No bruising, erythema, lesion, petechiae or rash.   Neurological:      Mental Status: She is alert and oriented to person, place, and time.      Cranial Nerves: No  cranial nerve deficit.      Sensory: No sensory deficit.      Motor: No weakness.      Coordination: Coordination normal.      Gait: Gait normal.      Deep Tendon Reflexes: Reflexes are normal and symmetric.   Psychiatric:         Mood and Affect: Mood normal. Mood is not anxious or depressed.         Behavior: Behavior normal.         Thought Content: Thought content normal.         Judgment: Judgment normal.       Labs- reviewed  Assessment:       Problem List Items Addressed This Visit       Invasive lobular carcinoma of left breast in female - Primary    History of renal cell cancer       Plan:       Breast cancer  No Oncotype sent and opted to hold off at age and with tumor characteristics  Tamoxifen  RTC end of 2/2023 or beginning of 3/2023 (approx 1 month post XRT completion)    2. RCC- SAW    Route Chart for Scheduling    Med Onc Chart Routing      Follow up with physician . End of 2/2023 or early 3/2023 see me-  requests to be called and for early appt   Follow up with FABIOLA    Infusion scheduling note    Injection scheduling note    Labs    Imaging    Pharmacy appointment    Other referrals

## 2023-01-04 ENCOUNTER — DOCUMENTATION ONLY (OUTPATIENT)
Dept: RADIATION ONCOLOGY | Facility: CLINIC | Age: 83
End: 2023-01-04
Payer: MEDICARE

## 2023-01-04 PROCEDURE — 77412 RADIATION TX DELIVERY LVL 3: CPT | Performed by: RADIOLOGY

## 2023-01-04 PROCEDURE — 77387 GUIDANCE FOR RADJ TX DLVR: CPT | Mod: TC | Performed by: RADIOLOGY

## 2023-01-04 PROCEDURE — G6002 STEREOSCOPIC X-RAY GUIDANCE: HCPCS | Mod: 26,,, | Performed by: RADIOLOGY

## 2023-01-04 PROCEDURE — G6002 PR STEREOSCOPIC XRAY GUIDE FOR RADIATION TX DELIV: ICD-10-PCS | Mod: 26,,, | Performed by: RADIOLOGY

## 2023-01-04 PROCEDURE — 77336 RADIATION PHYSICS CONSULT: CPT | Performed by: RADIOLOGY

## 2023-01-04 NOTE — PLAN OF CARE
Day 4 of outpatient radiation to the left breast.Nursing assessment completed. Skin care discussed. Miaderm cream given.

## 2023-01-05 PROCEDURE — 77387 GUIDANCE FOR RADJ TX DLVR: CPT | Mod: TC | Performed by: RADIOLOGY

## 2023-01-05 PROCEDURE — G6002 PR STEREOSCOPIC XRAY GUIDE FOR RADIATION TX DELIV: ICD-10-PCS | Mod: 26,,, | Performed by: RADIOLOGY

## 2023-01-05 PROCEDURE — 77412 RADIATION TX DELIVERY LVL 3: CPT | Performed by: RADIOLOGY

## 2023-01-05 PROCEDURE — G6002 STEREOSCOPIC X-RAY GUIDANCE: HCPCS | Mod: 26,,, | Performed by: RADIOLOGY

## 2023-01-06 PROCEDURE — G6002 PR STEREOSCOPIC XRAY GUIDE FOR RADIATION TX DELIV: ICD-10-PCS | Mod: 26,,, | Performed by: RADIOLOGY

## 2023-01-06 PROCEDURE — 77412 RADIATION TX DELIVERY LVL 3: CPT | Performed by: RADIOLOGY

## 2023-01-06 PROCEDURE — G6002 STEREOSCOPIC X-RAY GUIDANCE: HCPCS | Mod: 26,,, | Performed by: RADIOLOGY

## 2023-01-06 PROCEDURE — 77387 GUIDANCE FOR RADJ TX DLVR: CPT | Mod: TC | Performed by: RADIOLOGY

## 2023-01-06 PROCEDURE — 77417 THER RADIOLOGY PORT IMAGE(S): CPT | Performed by: RADIOLOGY

## 2023-01-09 PROCEDURE — 77412 RADIATION TX DELIVERY LVL 3: CPT | Performed by: RADIOLOGY

## 2023-01-09 PROCEDURE — 77387 GUIDANCE FOR RADJ TX DLVR: CPT | Mod: TC | Performed by: RADIOLOGY

## 2023-01-09 PROCEDURE — G6002 STEREOSCOPIC X-RAY GUIDANCE: HCPCS | Mod: 26,,, | Performed by: RADIOLOGY

## 2023-01-09 PROCEDURE — G6002 PR STEREOSCOPIC XRAY GUIDE FOR RADIATION TX DELIV: ICD-10-PCS | Mod: 26,,, | Performed by: RADIOLOGY

## 2023-01-10 PROCEDURE — G6002 STEREOSCOPIC X-RAY GUIDANCE: HCPCS | Mod: 26,,, | Performed by: RADIOLOGY

## 2023-01-10 PROCEDURE — G6002 PR STEREOSCOPIC XRAY GUIDE FOR RADIATION TX DELIV: ICD-10-PCS | Mod: 26,,, | Performed by: RADIOLOGY

## 2023-01-10 PROCEDURE — 77387 GUIDANCE FOR RADJ TX DLVR: CPT | Mod: TC | Performed by: RADIOLOGY

## 2023-01-10 PROCEDURE — 77412 RADIATION TX DELIVERY LVL 3: CPT | Performed by: RADIOLOGY

## 2023-01-11 ENCOUNTER — DOCUMENTATION ONLY (OUTPATIENT)
Dept: RADIATION ONCOLOGY | Facility: CLINIC | Age: 83
End: 2023-01-11
Payer: MEDICARE

## 2023-01-11 PROCEDURE — 77412 RADIATION TX DELIVERY LVL 3: CPT | Performed by: RADIOLOGY

## 2023-01-11 PROCEDURE — G6002 STEREOSCOPIC X-RAY GUIDANCE: HCPCS | Mod: 26,,, | Performed by: RADIOLOGY

## 2023-01-11 PROCEDURE — 77387 GUIDANCE FOR RADJ TX DLVR: CPT | Mod: TC | Performed by: RADIOLOGY

## 2023-01-11 PROCEDURE — G6002 PR STEREOSCOPIC XRAY GUIDE FOR RADIATION TX DELIV: ICD-10-PCS | Mod: 26,,, | Performed by: RADIOLOGY

## 2023-01-11 PROCEDURE — 77336 RADIATION PHYSICS CONSULT: CPT | Performed by: RADIOLOGY

## 2023-01-11 NOTE — PLAN OF CARE
Day 9 of outpatient radiation to the left breast. Tolerating treatment well. No skin issues seen or reported. Using Miaderm cream.

## 2023-01-12 PROCEDURE — 77387 GUIDANCE FOR RADJ TX DLVR: CPT | Mod: TC | Performed by: RADIOLOGY

## 2023-01-12 PROCEDURE — G6002 PR STEREOSCOPIC XRAY GUIDE FOR RADIATION TX DELIV: ICD-10-PCS | Mod: 26,,, | Performed by: RADIOLOGY

## 2023-01-12 PROCEDURE — G6002 STEREOSCOPIC X-RAY GUIDANCE: HCPCS | Mod: 26,,, | Performed by: RADIOLOGY

## 2023-01-12 PROCEDURE — 77412 RADIATION TX DELIVERY LVL 3: CPT | Performed by: RADIOLOGY

## 2023-01-13 PROCEDURE — G6002 PR STEREOSCOPIC XRAY GUIDE FOR RADIATION TX DELIV: ICD-10-PCS | Mod: 26,,, | Performed by: RADIOLOGY

## 2023-01-13 PROCEDURE — 77387 GUIDANCE FOR RADJ TX DLVR: CPT | Mod: TC | Performed by: RADIOLOGY

## 2023-01-13 PROCEDURE — 77417 THER RADIOLOGY PORT IMAGE(S): CPT | Performed by: RADIOLOGY

## 2023-01-13 PROCEDURE — 77412 RADIATION TX DELIVERY LVL 3: CPT | Performed by: RADIOLOGY

## 2023-01-13 PROCEDURE — G6002 STEREOSCOPIC X-RAY GUIDANCE: HCPCS | Mod: 26,,, | Performed by: RADIOLOGY

## 2023-01-17 PROCEDURE — G6002 STEREOSCOPIC X-RAY GUIDANCE: HCPCS | Mod: 26,,, | Performed by: RADIOLOGY

## 2023-01-17 PROCEDURE — 77412 RADIATION TX DELIVERY LVL 3: CPT | Performed by: RADIOLOGY

## 2023-01-17 PROCEDURE — 77387 GUIDANCE FOR RADJ TX DLVR: CPT | Mod: TC | Performed by: RADIOLOGY

## 2023-01-17 PROCEDURE — G6002 PR STEREOSCOPIC XRAY GUIDE FOR RADIATION TX DELIV: ICD-10-PCS | Mod: 26,,, | Performed by: RADIOLOGY

## 2023-01-18 PROCEDURE — G6002 PR STEREOSCOPIC XRAY GUIDE FOR RADIATION TX DELIV: ICD-10-PCS | Mod: 26,,, | Performed by: RADIOLOGY

## 2023-01-18 PROCEDURE — 77387 GUIDANCE FOR RADJ TX DLVR: CPT | Mod: TC | Performed by: RADIOLOGY

## 2023-01-18 PROCEDURE — 77412 RADIATION TX DELIVERY LVL 3: CPT | Performed by: RADIOLOGY

## 2023-01-18 PROCEDURE — G6002 STEREOSCOPIC X-RAY GUIDANCE: HCPCS | Mod: 26,,, | Performed by: RADIOLOGY

## 2023-01-19 PROCEDURE — 77334 RADIATION TREATMENT AID(S): CPT | Mod: 26,,, | Performed by: RADIOLOGY

## 2023-01-19 PROCEDURE — 77334 RADIATION TREATMENT AID(S): CPT | Mod: TC | Performed by: RADIOLOGY

## 2023-01-19 PROCEDURE — 77307 PR TELETHERAPY ISODOSE PLAN; COMPLEX: ICD-10-PCS | Mod: 26,,, | Performed by: RADIOLOGY

## 2023-01-19 PROCEDURE — 77307 TELETHX ISODOSE PLAN CPLX: CPT | Mod: 26,,, | Performed by: RADIOLOGY

## 2023-01-19 PROCEDURE — 77334 PR  RADN TREATMENT AID(S) COMPLX: ICD-10-PCS | Mod: 26,,, | Performed by: RADIOLOGY

## 2023-01-19 PROCEDURE — 77307 TELETHX ISODOSE PLAN CPLX: CPT | Mod: TC | Performed by: RADIOLOGY

## 2023-01-20 PROCEDURE — 77387 GUIDANCE FOR RADJ TX DLVR: CPT | Mod: TC | Performed by: RADIOLOGY

## 2023-01-20 PROCEDURE — G6002 PR STEREOSCOPIC XRAY GUIDE FOR RADIATION TX DELIV: ICD-10-PCS | Mod: 26,,, | Performed by: RADIOLOGY

## 2023-01-20 PROCEDURE — 77336 RADIATION PHYSICS CONSULT: CPT | Performed by: RADIOLOGY

## 2023-01-20 PROCEDURE — 77412 RADIATION TX DELIVERY LVL 3: CPT | Performed by: RADIOLOGY

## 2023-01-20 PROCEDURE — G6002 STEREOSCOPIC X-RAY GUIDANCE: HCPCS | Mod: 26,,, | Performed by: RADIOLOGY

## 2023-01-23 PROCEDURE — 77412 RADIATION TX DELIVERY LVL 3: CPT | Performed by: RADIOLOGY

## 2023-01-23 PROCEDURE — 77387 GUIDANCE FOR RADJ TX DLVR: CPT | Mod: TC | Performed by: RADIOLOGY

## 2023-01-23 PROCEDURE — G6002 PR STEREOSCOPIC XRAY GUIDE FOR RADIATION TX DELIV: ICD-10-PCS | Mod: 26,,, | Performed by: RADIOLOGY

## 2023-01-23 PROCEDURE — G6002 STEREOSCOPIC X-RAY GUIDANCE: HCPCS | Mod: 26,,, | Performed by: RADIOLOGY

## 2023-01-24 PROCEDURE — G6002 PR STEREOSCOPIC XRAY GUIDE FOR RADIATION TX DELIV: ICD-10-PCS | Mod: 26,,, | Performed by: RADIOLOGY

## 2023-01-24 PROCEDURE — 77387 GUIDANCE FOR RADJ TX DLVR: CPT | Mod: TC | Performed by: RADIOLOGY

## 2023-01-24 PROCEDURE — 77412 RADIATION TX DELIVERY LVL 3: CPT | Performed by: RADIOLOGY

## 2023-01-24 PROCEDURE — G6002 STEREOSCOPIC X-RAY GUIDANCE: HCPCS | Mod: 26,,, | Performed by: RADIOLOGY

## 2023-01-25 ENCOUNTER — DOCUMENTATION ONLY (OUTPATIENT)
Dept: RADIATION ONCOLOGY | Facility: CLINIC | Age: 83
End: 2023-01-25
Payer: MEDICARE

## 2023-01-25 PROCEDURE — 77412 RADIATION TX DELIVERY LVL 3: CPT | Performed by: RADIOLOGY

## 2023-01-25 NOTE — PLAN OF CARE
Day 17 of outpatient radiation to the left breast. Brisk erythema noted in radiation field.Skin intact. Using Miaderm cream.

## 2023-01-26 PROCEDURE — 77412 RADIATION TX DELIVERY LVL 3: CPT | Performed by: RADIOLOGY

## 2023-01-27 PROCEDURE — 77412 RADIATION TX DELIVERY LVL 3: CPT | Performed by: RADIOLOGY

## 2023-01-30 PROCEDURE — 77412 RADIATION TX DELIVERY LVL 3: CPT | Performed by: RADIOLOGY

## 2023-01-30 PROCEDURE — 77336 RADIATION PHYSICS CONSULT: CPT | Performed by: RADIOLOGY

## 2023-01-31 PROCEDURE — 77412 RADIATION TX DELIVERY LVL 3: CPT | Performed by: RADIOLOGY

## 2023-02-01 ENCOUNTER — HOSPITAL ENCOUNTER (OUTPATIENT)
Dept: RADIATION THERAPY | Facility: HOSPITAL | Age: 83
Discharge: HOME | End: 2023-02-01
Attending: RADIOLOGY
Payer: MEDICARE

## 2023-02-01 PROCEDURE — 77412 RADIATION TX DELIVERY LVL 3: CPT | Performed by: RADIOLOGY

## 2023-02-02 ENCOUNTER — DOCUMENTATION ONLY (OUTPATIENT)
Dept: RADIATION ONCOLOGY | Facility: CLINIC | Age: 83
End: 2023-02-02
Payer: MEDICARE

## 2023-02-08 ENCOUNTER — TELEPHONE (OUTPATIENT)
Dept: UROLOGY | Facility: CLINIC | Age: 83
End: 2023-02-08
Payer: MEDICARE

## 2023-02-09 ENCOUNTER — OFFICE VISIT (OUTPATIENT)
Dept: UROLOGY | Facility: CLINIC | Age: 83
End: 2023-02-09
Payer: MEDICARE

## 2023-02-09 VITALS — BODY MASS INDEX: 23.51 KG/M2 | WEIGHT: 150.13 LBS

## 2023-02-09 DIAGNOSIS — N39.0 RECURRENT UTI: ICD-10-CM

## 2023-02-09 DIAGNOSIS — C64.1 MALIGNANT NEOPLASM OF RIGHT KIDNEY: Primary | ICD-10-CM

## 2023-02-09 PROCEDURE — 87077 CULTURE AEROBIC IDENTIFY: CPT | Performed by: UROLOGY

## 2023-02-09 PROCEDURE — 87186 SC STD MICRODIL/AGAR DIL: CPT | Performed by: UROLOGY

## 2023-02-09 PROCEDURE — 99999 PR PBB SHADOW E&M-EST. PATIENT-LVL III: ICD-10-PCS | Mod: PBBFAC,,, | Performed by: UROLOGY

## 2023-02-09 PROCEDURE — 87086 URINE CULTURE/COLONY COUNT: CPT | Performed by: UROLOGY

## 2023-02-09 PROCEDURE — 99214 OFFICE O/P EST MOD 30 MIN: CPT | Mod: S$PBB,,, | Performed by: UROLOGY

## 2023-02-09 PROCEDURE — 87088 URINE BACTERIA CULTURE: CPT | Performed by: UROLOGY

## 2023-02-09 PROCEDURE — 99213 OFFICE O/P EST LOW 20 MIN: CPT | Mod: PBBFAC | Performed by: UROLOGY

## 2023-02-09 PROCEDURE — 99999 PR PBB SHADOW E&M-EST. PATIENT-LVL III: CPT | Mod: PBBFAC,,, | Performed by: UROLOGY

## 2023-02-09 PROCEDURE — 99214 PR OFFICE/OUTPT VISIT, EST, LEVL IV, 30-39 MIN: ICD-10-PCS | Mod: S$PBB,,, | Performed by: UROLOGY

## 2023-02-09 NOTE — PATIENT INSTRUCTIONS
BLADDER RETRAINING   CONTROLLING URGENCY AND FREQUENCY     URGENCY is the sudden need to urinate that may cause urine to leak on the way to the bathroom.     FREQUENCY is urinating often, usually 8 times or more in a full day (24 hours). Many times, the amount of urine leaked is only small. Frequency can get worse if you start the habit of urinating just in case, because the bladder never fills completely and gets used to holding a small amount of urine. It is better to wait until the bladder is full.   Urgency and Frequency happen when the bladder muscle starts to contract before you go to the toilet.     Urgency follows a wave pattern; it starts, grows, peaks, and then subsides until it stops.   The key to controlling the urinary urgency is practicing bladder training. When you feel a sudden, urgent need to urinate, do not rush or try to hurry to the bathroom. Rushing will jiggle your bladder and increase the urge to go.     Follow a Bladder training Schedule   To start bladder training, urinate every hour from the time you get up in the morning until you go to bed at night. If you have an urge to urinate before 1 hour has passed, then you may need to start with a shorter time, such as every 30 minutes. Continue to urinate on this schedule until you can do it every day for a week. With your urgency under control, increase the time between urinating by 15-30 minutes each week until you can last 3 to 4 hours. If you dont succeed during any week, stay on the same schedule for one more week.     Increase your voiding intervals by 1?2 hour every week, until you can wait to void every 3-4 hours or even longer.     TAKE CONTROL OF URGENCY   You can take control over your urgency and frequency by using some simple strategies. If you can distract yourself long enough, often the feeling of urgency will pass. It may take some practice with these strategies, but over time you will see that you are gaining more control  and experiencing fewer episodes of urgency.     When you feel urgency, try these strategies:    Focus on another body sensation. Deep breathing is good. Sit down and take five slow, deep breaths. Think about the air moving in and out of your lungs instead of how your bladder feels.      Squeeze your pelvic floor muscles five times quickly and strongly. These are called quick flicks. Often, this will relax the bladder so that the feeling of urgency goes away. Or, you could try holding one strong squeeze of your pelvic floor muscles. A good way to do this is to sit down and press your thighs tightly together. Try each way and see which one works best for you.      Distract yourself by focusing on a mental activity:     Use mind games. Turn your attention to counting backward from 100 by 7s or  working on a crossword puzzle.     Do a task that requires a lot of thought - for example, balance your checkbook,  write a letter, do handwork, plan the weekly food menus, or some other activity  that requires a great deal of attention. Note: TV watching and talking on the  telephone are not focuses your thinking. Note: TV watching and talking on the  telephone are not distracting enough.      Use self-talk or good self-statements. Tell yourself: I am the boss, not my bladder. I am in control. I can beat this. Create a statement that fits your situation and personality the best. Keep saying this statement over and over until the feeling of urgency passes.     Sometimes you will need to perform more than one of these approaches before the feeling of urgency goes away. If at first you do not succeed, do not give up. Remember, it takes practice to gain control over urgency.     When to Go to the Bathroom   After the urge goes away, try to wait until your scheduled urinating time before going to the bathroom. If you dont think you can wait that long, at least wait several minutes longer, then go to the bathroom whether you  feel you have to go or not. Never rush or run to the bathroom--walk slowly.     How Should I Start Bladder Training?   Begin by only practicing this at home where you are relaxed and the bathroom is nearby. Empty your bladder right before going to sleep to reduce the chances of waking up to urinate. Dont forget to cut down on drinks with caffeine and alcohol.     When Will I Notice a Change?   In 3-4 weeks you should see improvement. You should only be urinating every 3-4 hours during the day and be up less often at night. Do not despair or get discouraged if you do not see fast progress.

## 2023-02-09 NOTE — PROGRESS NOTES
Subjective:       Lola Lerner is a 82 y.o. female who is an established patient who was self-referred for evaluation of OAB/UTIs and RCC.      Recurrent UTIs  She reports recurrent UTIs. No recent UCx in Epic to review. She reports this happens about 3 times a year. She has classic UTI symptoms - dysuria, frequency. Denies fevers. Denies constipation. No h/o stones.      She also has issues with OAB. Reports urgency and frequency. Nocturia x 2. UUI common usually due to waiting to void. Denies SAMSON. She is on Vesicare 10mg now for about 3 years. This medication helps a lot.     She was treated for UTI after last visit in  (>100k E coli).    Returns with c/o worsening OAB - more frequency and urgency, no UUI. Nocturia x 4. Still taking Vesicare 10mg. UA concerning for UTI. Denies dysuria. Symptoms worsened x 2 weeks - she was treated for UTI at that time.   PVR (bladder scan)last visit - 0cc    More issues with UI and UTIs. Now on Sanctura (was on Vesicare) - seemed to be working slightly better. Sanctura now causing more severe dry mouth. Stopped Myrbetriq.     2021  Using Oxytrol patch - helped mildly with frequency. Mild dry mouth   PVR (bladder scan) today - 89cc    3/29/2021   Still with frequency, suddenly worsened 4 days ago. +nit urine today. Denies dysuria.   PVR (bladder scan) today - 56cc    2021  Treated for UTI last visit. Known issue of rUTIs. Still with some frequency/urgency. Oxytrol patch working well though some difficulty finding it. Denies dysuria.   PVR (bladder scan) today - 107cc    2021  Still with OAB issues while using Oxytrol. CT/CXR clear.     1/10/2022  Restarted Myrbetriq 25mg - some improvement of OAB.     2022  She returns now with c/o difficulty walking and stiffness in R leg about 1 week ago. Used walker, now improved. Denies LUTS - no dysuria or other UTI symptoms. She does not currently have PCP (recently ).     2023  Recent breast  cancer diagnosis. Returns with frequency. Denies dysuria. +UA. On Myrbetriq 50mg.   PVR (bladder scan) today - 16cc        RCC  VICTORINA was done prior to last visit for UTI workup and noted a 3.5cm solid R renal mass. PVR 66cc. No family history of  malignancy. No gross hematuria.      CT scan shows an enhancing 4.6cm R UP renal mass. CXR showed abnormal area in R lung. CT chest showed likely inflammatory scarring, doubt metastasis.    She is now s/p R open partial nephrectomy on 8/17/16. She did well after surgery.    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   CT c/a/p 5/17 - post-op changes R kidney, RUL lung area resolved, now with 2mm nodule in RML lung. CT c/a/p 11/17 - stable R kidney, micronodules in R lung  CT c/a/p 11/18 - R renal scar, stable micronodules in lung  CT a/p 11/19 - R renal scar, no mets/recurrence, stable micronodules in lung, CXR - wnl  CT a/p 11/20 - post-op R kidney, no mets/recurrence, simple L renal cysts. Stable micronodule in RLL lung. CXR - wnl  CT a/p 11/21 - no mets/recurrence. CXR - wnl         The following portions of the patient's history were reviewed and updated as appropriate: allergies, current medications, past family history, past medical history, past social history, past surgical history and problem list.    Review of Systems  Constitutional: no fever or chills  ENT: no nasal congestion or sore throat  Respiratory: no cough or shortness of breath  Cardiovascular: no chest pain or palpitations  Gastrointestinal: no nausea or vomiting, tolerating diet  Genitourinary: as per HPI  Hematologic/Lymphatic: no easy bruising or lymphadenopathy  Musculoskeletal: no arthralgias or myalgias  Skin: no rashes or lesions  Neurological: no seizures or tremors  Behavioral/Psych: no auditory or visual hallucinations       Objective:    Vitals:   Wt 68.1 kg (150 lb 2.1  oz)   BMI 23.51 kg/m²     Physical Exam   General: well developed, well nourished in no acute distress  Head: normocephalic, atraumatic  Neck: supple, trachea midline, no obvious enlargement of thyroid  HEENT: EOMI, mucus membranes moist, sclera anicteric, no hearing impairment  Lungs: symmetric expansion, non-labored breathing  Cardiovascular: regular rate and rhythm, normal pulses  Skin: no rashes or lesions  Neuro: alert and oriented x 3, no gross deficits  Psych: normal judgment and insight, normal mood/affect and non-anxious  Genitourinary:   patient declined exam    Inc R flank - well healed      Lab Review    Urine analysis today in clinic shows -+nit, ++LE, 5-10 RBCs (denies dysuria)    Lab Results   Component Value Date    WBC 8.94 09/07/2022    HGB 15.0 09/07/2022    HCT 45.3 09/07/2022    MCV 94 09/07/2022     09/07/2022     Lab Results   Component Value Date    CREATININE 0.7 09/29/2022    BUN 14 09/07/2022         Imaging  Images and reports were personally reviewed by me and discussed with patient  VICTORINA reviewed, CT reviewed       Assessment/Plan:      1. Recurrent UTI    - VICTORINA with PVR - R renal mass, mildly elevated PVR 66cc   - Recommended Estrace, she declined this.    - Improving UUI will also likely help improve UTIs     2. OAB (overactive bladder)    - Worsened previously 2/2 UTI - treated   - Myrbetriq - stopped 2/2 SE (dizziness)   - Sanctura - seems to help but severe dry mouth   - Oxytrol patch (OTC) - some improvement, using though some difficulty obtaining   - Myrbetriq 50mg   - Bladder retraining/pelvic floor exercises handout given today     3. 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 Okay to stop screening - consider VICTORINA q2y (11/23).    4.  Leg pain   - Do not suspect  related    - Improved   - No urinary symptoms    - Recent imaging of kidney (11/21) normal            Follow up in 6 months

## 2023-02-11 LAB — BACTERIA UR CULT: ABNORMAL

## 2023-02-13 ENCOUNTER — TELEPHONE (OUTPATIENT)
Dept: UROLOGY | Facility: CLINIC | Age: 83
End: 2023-02-13
Payer: MEDICARE

## 2023-02-13 RX ORDER — SULFAMETHOXAZOLE AND TRIMETHOPRIM 800; 160 MG/1; MG/1
1 TABLET ORAL 2 TIMES DAILY
Qty: 14 TABLET | Refills: 0 | Status: SHIPPED | OUTPATIENT
Start: 2023-02-13 | End: 2023-02-20

## 2023-02-13 NOTE — TELEPHONE ENCOUNTER
I LM for pt to call clinic back and to provide her with results    ----- Message from Ryann Bell MD sent at 2/13/2023 11:44 AM CST -----  Please inform patient of urinary tract infection on recent urine culture. Appropriate antibiotics (Bactrim) were sent in to the pharmacy.

## 2023-02-15 ENCOUNTER — TELEPHONE (OUTPATIENT)
Dept: UROLOGY | Facility: CLINIC | Age: 83
End: 2023-02-15
Payer: MEDICARE

## 2023-02-15 NOTE — TELEPHONE ENCOUNTER
Pts  called to let us know they did receive the message on Monday and pt is taking the antibiotics.

## 2023-02-15 NOTE — TELEPHONE ENCOUNTER
----- Message from Kelly Wiley sent at 2/15/2023  3:42 PM CST -----  .Type:  Patient Returning Call    Who Called: Finesse -     Who Left Message for Patient: Rachel    Does the patient know what this is regarding?: No    Would the patient rather a call back or a response via [facility]? Call    Best Call Back Number:.[phone] (home) [phone] (work)      Additional Information:

## 2023-02-16 ENCOUNTER — TELEPHONE (OUTPATIENT)
Dept: RADIATION ONCOLOGY | Facility: CLINIC | Age: 83
End: 2023-02-16
Payer: MEDICARE

## 2023-02-16 NOTE — TELEPHONE ENCOUNTER
Call to pt to see how she was doing since completing left breast radiation 2/1/23. Spoke with . States pt doing well. No significant skin issues reported. Using Lubriderm fragrance free lotion. Appt for f/u scheduled 3/1/23 at 9:00 AM.Appt reminder mailed to pt.

## 2023-02-27 ENCOUNTER — OFFICE VISIT (OUTPATIENT)
Dept: HEMATOLOGY/ONCOLOGY | Facility: CLINIC | Age: 83
End: 2023-02-27
Payer: MEDICARE

## 2023-02-27 VITALS
DIASTOLIC BLOOD PRESSURE: 60 MMHG | TEMPERATURE: 97 F | OXYGEN SATURATION: 98 % | RESPIRATION RATE: 17 BRPM | HEART RATE: 68 BPM | WEIGHT: 148.5 LBS | SYSTOLIC BLOOD PRESSURE: 140 MMHG | HEIGHT: 67 IN | BODY MASS INDEX: 23.31 KG/M2

## 2023-02-27 DIAGNOSIS — C50.912 INVASIVE LOBULAR CARCINOMA OF LEFT BREAST IN FEMALE: Primary | ICD-10-CM

## 2023-02-27 DIAGNOSIS — I10 HYPERTENSION, UNSPECIFIED TYPE: ICD-10-CM

## 2023-02-27 DIAGNOSIS — C64.1 MALIGNANT NEOPLASM OF RIGHT KIDNEY: ICD-10-CM

## 2023-02-27 DIAGNOSIS — E78.2 MIXED HYPERLIPIDEMIA: ICD-10-CM

## 2023-02-27 PROCEDURE — 99999 PR PBB SHADOW E&M-EST. PATIENT-LVL IV: CPT | Mod: PBBFAC,,, | Performed by: INTERNAL MEDICINE

## 2023-02-27 PROCEDURE — 99214 PR OFFICE/OUTPT VISIT, EST, LEVL IV, 30-39 MIN: ICD-10-PCS | Mod: S$PBB,,, | Performed by: INTERNAL MEDICINE

## 2023-02-27 PROCEDURE — 99999 PR PBB SHADOW E&M-EST. PATIENT-LVL IV: ICD-10-PCS | Mod: PBBFAC,,, | Performed by: INTERNAL MEDICINE

## 2023-02-27 PROCEDURE — 99214 OFFICE O/P EST MOD 30 MIN: CPT | Mod: PBBFAC | Performed by: INTERNAL MEDICINE

## 2023-02-27 PROCEDURE — 99214 OFFICE O/P EST MOD 30 MIN: CPT | Mod: S$PBB,,, | Performed by: INTERNAL MEDICINE

## 2023-02-27 NOTE — PROGRESS NOTES
Subjective:       Patient ID: Lola Lerner is a 82 y.o. female.    Chief Complaint: Invasive lobular carcinoma of left breast in female    HPI    Reports tolerated XRT well- mild fatigue noted and mild skin changes  No pain  Weight stable    Reports tolerating Tamoxifen well- started after completing XRT (takes at night)  Notes no side effects    Presents for follow up of invasive lobular breast cancer diagnosis after completion of surgery  pT3 pN1mi      Oncology History:  - self detected a lump in her left breast about one month prior. She has not noted any pain, skin changes, or nipple discharge. She did report that her left nipple had changed somewhat (may be more inverted now).     - she was up to date with screening mammograms up until 2018. She required a biopsy once for a suspicious finding on mammogram but this was ultimately benign.      - 9/14/2022 Diagnostic Mammogram:  Findings:  This procedure was performed using tomosynthesis. Computer-aided detection was utilized in the interpretation of this examination.  The breasts have scattered areas of fibroglandular density.   Mammo Digital Diagnostic Bilat with Jovanny  Left  Developing Asymmetry: There is a developing asymmetry seen in the upper inner quadrant of the left breast. The asymmetry correlates with the palpable mass and skin changes reported by the patient.   Right  There is no evidence of suspicious masses, calcifications, or other abnormal findings in the right breast.  US Breast Left Limited  Left  Mass: There is a 26 mm x 33 mm x 29 mm irregularly shaped, non-parallel, hypoechoic mass with indistinct margins with shadowing seen in the upper inner quadrant of the left breast at 10 o'clock, 2 cm from the nipple. The mass correlates with the palpable mass reported by the patient.    Targeted ultrasound of the left axilla is normal.  Impression:  Left  Mass: Left breast 26 mm x 33 mm x 29 mm mass at the upper inner 10 o'clock position.  Assessment: 4A - Suspicious finding. Biopsy is recommended.   Right  There is no mammographic or sonographic evidence of malignancy in the right breast.  BI-RADS Category:   Overall: 4A - Low Suspicion for Malignancy  Recommendation:  Biopsy is recommended. I discussed these findings and recommendations with the patient and her  in detail at the time of exam.     - 9/20/2022 Breast Biopsy:  Breast, left, ultrasound-guided core needle biopsies of mass:   - Invasive lobular carcinoma       - Present in all sampled cores (6 mm in greatest contiguous dimension)       - Aparna-Pal modified SBR score 3 + 2 + 1 = 6 of 9       - Histologic grade 2 of 3       - Mitotic index = 0.1 (average mitoses per high power field) (low)   - Biomarkers, assessed by immunohistochemistry on block 1A       - Estrogen Receptor (ER):  Positive (90%; strong intensity)       - Progesterone Receptor ([place]):  Positive (70%; strong intensity)       - HER2:  Equivocal (2+)       - HER2 FISH pending; results to follow in a supplemental report       - Ki-67 proliferation index:  Averages 5-10%   - Lymphovascular invasion is not identified      - 9/29/2022 Unable to tolerate Breast MRI     - 10/19/2022 Lumpectomy and [address] LN assessment with Dr. Spencer  Pathology:  1. LEFT BREAST TO INCLUDE A SEGMENT OF SKIN WITHOUT NIPPLE, PARTIAL   MASTECTOMY:   -  Extensive invasive pleomorphic lobular carcinoma, [place] histologic   grade 2, with focal lymphovascular invasion and focal microcalcifications;   tumor size = 72 mm.  The invasive carcinoma directly extends into the   anterior, inferior, superior, medial, and lateral margins.  The invasive   carcinoma is 1.9 mm from the posterior margin.  Please note that the anterior   margin is the only final margin on this specimen.  The skin is free of tumor.     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   -   Focal atypical ductal hyperplasia.   -  Focal flat epithelial atypia.   -  Multifocal complex sclerosing lesions with focal florid usual ductal   hyperplasia and focal intraductal micropapillomas.   -  Small fibroadenoma, tumor size = 4 mm.   -  Fibrocystic changes with columnar cell change, columnar cell hyperplasia,   adenosis with blunt duct adenosis and sclerosing adenosis, usual ductal   hyperplasia, apocrine metaplasia, microcysts, stromal fibrosis, and   microcalcifications.   -  Focal duct ectasia.   -  Moderate-to-marked arteriosclerosis.   -  Organizing biopsy site with fat necrosis, fibrosis and chronic   inflammation.   2. LEFT BREAST, ADDITIONAL SUPERIOR MARGIN, EXCISION:   -  No evidence of malignancy; there is no invasive lobular carcinoma or   lobular carcinoma in situ.   -  No breast ducts or lobules are present for evaluation.   3. LEFT BREAST, ADDITIONAL INFERIOR MARGIN, EXCISION:   -  Invasive pleomorphic lobular carcinoma, Huyen histologic grade 2,   with multifocal lymphovascular invasion.  The invasive carcinoma focally   extends into the new inferior margin.   -  Focal lobular carcinoma in situ with duct extension.   -  Focal complex sclerosing lesion.   -  Fibrocystic changes with columnar cell change, columnar cell hyperplasia,   usual ductal hyperplasia, adenosis with blunt duct adenosis, apocrine   metaplasia, microcysts, stromal fibrosis and focal microcalcifications.   -  Focal duct ectasia.   4. LEFT BREAST, ADDITIONAL MEDIAL MARGIN, EXCISION:   -  Invasive pleomorphic lobular carcinoma, [place] histologic grade 2,   with focal lymphovascular invasion.  The new medial margin is free of tumor   and the invasive carcinoma is 0.9 mm from the new medial margin.   -  Focal lobular carcinoma in situ.   -  Fibrocystic changes with columnar cell change, usual ductal hyperplasia,   and stromal fibrosis.   5. LEFT BREAST, ADDITIONAL LATERAL MARGIN, EXCISION:   -  No evidence of  malignancy; there is no residual invasive lobular carcinoma   or lobular carcinoma in situ.   -  Focal mild fibrocystic changes with columnar cell change, adenosis, and   stromal fibrosis.   6. LEFT BREAST, ADDITIONAL POSTERIOR MARGIN, EXCISION:   -  No evidence of malignancy; there is no residual invasive lobular carcinoma   or lobular carcinoma in situ.   -  No breast ducts or lobules are present for evaluation.   7. LEFT AXILLARY SENTINEL LYMPH NODES (5), EXCISIONAL BIOPSY:   -  1 out of 5 lymph nodes demonstrates micrometastatic pleomorphic lobular   carcinoma; size of metastatic deposit = 1.4 mm.  There is no evidence of   extranodal extension.   -  Mild reactive lymphoid hyperplasia, mixed pattern.   -  Mild lipomatosis.   INVASIVE CARCINOMA OF THE BREAST, RESECTION, CANCER CASE SUMMARY:   PROCEDURE:  Partial mastectomy/lumpectomy with segment of skin without nipple.   SPECIMEN LATERALITY:  Left.   TUMOR SITE:  Not specified.   TUMOR, HISTOLOGIC TYPE:  Invasive pleomorphic lobular carcinoma.   TUMOR, HISTOLOGIC GRADE (ANTONIO HISTOLOGIC SCORE):        Glandular (acinar)/tubular differentiation:  Score 3.        Nuclear pleomorphism:  Score 2.        Mitotic rate:  Score 1.        Overall grade:  Grade 2 (score 6).   TUMOR SIZE:  72 mm.   TUMOR FOCALITY:  Can not be determined.   DUCTAL CARCINOMA IN SITU (DCIS):  Not identified.   TUMOR EXTENT:        Skin:  Skin is present and uninvolved.        Nipple and skeletal muscle:  Not applicable (nipple and skeletal muscle   are absent).   LYMPHOVASCULAR INVASION:  Present.   DERMAL LYMPHOVASCULAR INVASION:  Not identified.   MICROCALCIFICATIONS:  Present in invasive carcinoma, LCIS, and non-neoplastic   tissue.   TREATMENT EFFECT IN THE BREAST:  No known presurgical therapy.   MARGIN STATUS FOR INVASIVE CARCINOMA:        MARGINS INVOLVED BY INVASIVE CARCINOMA:  The anterior and inferior   margins are involved by invasive carcinoma.             Extent of  involvement of anterior margin:  4 mm.             Extent of involvement of inferior margin:  Less than 1 mm.        MARGINS UNINVOLVED BY INVASIVE CARCINOMA:  The medial, superior,   posterior and lateral margins are uninvolved by invasive carcinoma.             Distance from invasive carcinoma to medial margin:  0.9 mm.             Distance from invasive carcinoma to superior margin:  8 mm.             Distance from invasive carcinoma to posterior margin:  9.9 mm.             Distance from invasive carcinoma to lateral margin:  13 mm.   REGIONAL LYMPH NODE STATUS:  Regional lymph nodes present; tumor present in   regional lymph node.        Number of lymph nodes with macrometastasis:  0.        Number of lymph nodes with micrometastasis:  1.        Number of lymph nodes with isolated tumor cells:  0.        Size of largest willy metastatic deposit:  1.4 mm.        Extranodal extension:  Not identified.        Total number of lymph nodes examined (sentinel and nonsentinel):  5.        Number of sentinel nodes examined:  5.   DISTANT METASTASIS:  Not applicable.   PATHOLOGIC STAGE CLASSIFICATION (pTNM, AJCC 8TH EDITION):   pT3:  Tumor size = 72 mm (greater than 50 mm in greatest dimension).   pN1mi (sn):  Micrometastasis in 1 axillary sentinel lymph node.   pM: Not applicable.   ADDITIONAL FINDINGS:  Please see above final diagnoses on specimens 1-7.   BREAST BIOMARKER TESTING PERFORMED ON PREVIOUS BIOPSY, CASE [id number]   (interpreted by Dr. Esteban Grace):  Estrogen receptor (ER):  Positive   (90%; strong intensity)   Progesterone receptor (PGR):  Positive (70%; strong intensity).   HER2 by IHC:  Equivocal (2+).   HER2 by FISH:  Negative (performed at [facility]).   Ki-67 proliferation index:  Averages 5-10%.      GynHx:   Menarche around age 14 but unsure  Menopause in early 50s, unsure  A0  Very minimal breastfeeding     FH:  No personal history of breast cancer, no family history of breast cancer. No  family history of prostate, endometrial, or ovarian cancer.      SH:    Never smoker, non drinker     PMH:          Active Ambulatory Problems     Diagnosis Date Noted    Hypertension 07/17/2013    Hyperlipidemia 07/17/2013    Recurrent UTI 04/28/2016    OAB (overactive bladder) 04/28/2016    Malignant neoplasm of right kidney 08/17/2016    Invasive lobular carcinoma of breast, stage 2, left 10/03/2022              Resolved Ambulatory Problems     Diagnosis Date Noted    No Resolved Ambulatory Problems              Past Medical History:   Diagnosis Date    Urge incontinence      Vaginal delivery       Review of Systems   Constitutional:  Negative for activity change, appetite change, chills, fatigue, fever and unexpected weight change.   HENT:  Negative for nasal congestion, mouth sores, postnasal drip, rhinorrhea, sinus pressure/congestion and trouble swallowing.    Eyes:  Negative for visual disturbance.   Respiratory:  Negative for cough and shortness of breath.    Cardiovascular:  Negative for chest pain, palpitations and leg swelling.   Gastrointestinal:  Negative for abdominal pain, diarrhea, nausea, vomiting and reflux.   Genitourinary:  Negative for bladder incontinence, difficulty urinating, dysuria and frequency.   Musculoskeletal:  Negative for arthralgias, back pain, gait problem, joint swelling and joint deformity.   Integumentary:  Negative for rash.   Neurological:  Negative for dizziness, weakness, light-headedness, numbness and headaches.   Hematological:  Negative for adenopathy. Does not bruise/bleed easily.   Psychiatric/Behavioral:  Negative for dysphoric mood and sleep disturbance. The patient is not nervous/anxious.        Objective:      Physical Exam  Vitals and nursing note reviewed.   Constitutional:       General: She is not in acute distress.     Appearance: Normal appearance. She is well-developed and normal weight. She is not ill-appearing.      Comments: Presents with her    ECOG= 0  Very pleasant   HENT:      Head: Normocephalic and atraumatic.   Eyes:      General: No scleral icterus.        Right eye: No discharge.         Left eye: No discharge.      Extraocular Movements: Extraocular movements intact.      Conjunctiva/sclera: Conjunctivae normal.      Pupils: Pupils are equal, round, and reactive to light.      Right eye: Pupil is round and reactive.      Left eye: Pupil is round and reactive.   Neck:      Thyroid: No thyromegaly.      Vascular: No JVD.      Trachea: No tracheal deviation.   Cardiovascular:      Rate and Rhythm: Normal rate and regular rhythm.      Heart sounds: Normal heart sounds. No murmur heard.    No friction rub. No gallop.   Pulmonary:      Effort: Pulmonary effort is normal. No respiratory distress.      Breath sounds: Normal breath sounds. No wheezing, rhonchi or rales.   Abdominal:      General: Abdomen is flat. Bowel sounds are normal. There is no distension.      Palpations: Abdomen is soft. There is no mass.      Tenderness: There is no abdominal tenderness. There is no guarding or rebound.      Comments: No organomegaly   Musculoskeletal:         General: No swelling, tenderness or deformity. Normal range of motion.      Cervical back: Normal range of motion and neck supple.      Right lower leg: No edema.      Left lower leg: No edema.   Lymphadenopathy:      Head:      Right side of head: No submandibular adenopathy.      Left side of head: No submandibular adenopathy.      Cervical: No cervical adenopathy.      Right cervical: No superficial, deep or posterior cervical adenopathy.     Left cervical: No superficial, deep or posterior cervical adenopathy.      Upper Body:      Right upper body: No supraclavicular adenopathy.      Left upper body: No supraclavicular adenopathy.   Skin:     General: Skin is warm and dry.      Coloration: Skin is not jaundiced or pale.      Findings: No bruising, erythema, lesion, petechiae or rash.    Neurological:      General: No focal deficit present.      Mental Status: She is alert and oriented to person, place, and time.      Cranial Nerves: No cranial nerve deficit.      Sensory: No sensory deficit.      Motor: No weakness.      Coordination: Coordination normal.      Gait: Gait normal.      Deep Tendon Reflexes: Reflexes are normal and symmetric.   Psychiatric:         Mood and Affect: Mood normal. Mood is not anxious or depressed.         Behavior: Behavior normal.         Thought Content: Thought content normal.         Judgment: Judgment normal.       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 Inked renal resection margin is negative for malignancy.    Breast cancer:   No Oncotype sent and opted to hold off at age and with tumor characteristics  Tamoxifen started and tolerating well  Reviewed follow up guidelines and knows she can call for any issues  RTC 3 months    HTN, Hyperlipidemia- managed by PCP and Cardiology    Route Chart for Scheduling    Med Onc Chart Routing      Follow up with physician 6 months. cbc, cmp prior   Follow up with FABIOLA 3 months. cbc, cmp prior   Infusion scheduling note    Injection scheduling note    Labs    Imaging    Pharmacy appointment    Other referrals        30 minutes total on encounter

## 2023-03-01 ENCOUNTER — OFFICE VISIT (OUTPATIENT)
Dept: RADIATION ONCOLOGY | Facility: CLINIC | Age: 83
End: 2023-03-01
Payer: MEDICARE

## 2023-03-01 VITALS
BODY MASS INDEX: 23.85 KG/M2 | HEART RATE: 64 BPM | DIASTOLIC BLOOD PRESSURE: 65 MMHG | WEIGHT: 152.31 LBS | OXYGEN SATURATION: 99 % | SYSTOLIC BLOOD PRESSURE: 146 MMHG | TEMPERATURE: 98 F

## 2023-03-01 DIAGNOSIS — C50.912 INVASIVE LOBULAR CARCINOMA OF BREAST, STAGE 2, LEFT: Primary | ICD-10-CM

## 2023-03-01 PROCEDURE — 99999 PR PBB SHADOW E&M-EST. PATIENT-LVL III: CPT | Mod: PBBFAC,,, | Performed by: RADIOLOGY

## 2023-03-01 PROCEDURE — 99999 PR PBB SHADOW E&M-EST. PATIENT-LVL III: ICD-10-PCS | Mod: PBBFAC,,, | Performed by: RADIOLOGY

## 2023-03-01 PROCEDURE — 99024 PR POST-OP FOLLOW-UP VISIT: ICD-10-PCS | Mod: POP,,, | Performed by: RADIOLOGY

## 2023-03-01 PROCEDURE — 99024 POSTOP FOLLOW-UP VISIT: CPT | Mod: POP,,, | Performed by: RADIOLOGY

## 2023-03-01 PROCEDURE — 99213 OFFICE O/P EST LOW 20 MIN: CPT | Mod: PBBFAC | Performed by: RADIOLOGY

## 2023-03-01 NOTE — PROGRESS NOTES
DEPARTMENT OF RADIATION ONCOLOGY  FOLLOW-UP NOTE        Patient Name: Lola Lerner  MRN: [id number]  : 1940    DIAGNOSIS:  Cancer Staging   Invasive lobular carcinoma of left breast in female  Staging form: Breast, AJCC 8th Edition  - Clinical: Stage IB (cT2, cN0, cM0, G2, ER+, OR+, HER2-) - Signed by Catherine Hardy MD on 10/3/2022  - Pathologic stage from 10/19/2022: Stage IB (pT3, pN1mi(sn), cM0, G2, ER+, OR+, HER2-) - Signed by Catherine Hardy MD on 2022      PATIENT IDENTIFICATION:  The patient is an 82 year old woman with prognostic Stage IB invasive lobular carcinoma of the left breast.   The patient underwent diagnostic mammogram on 2022. Imaging revealed an asymmetry in the upper inner quadrant of the left breast. The mass measured 26 x 33 x 29 mm with indistinct margins on ultrasound. Ultrasound-guided biopsy was performed of the mass which revealed grade 2 invasive lobular carcinoma. On immunohistochemistry, tumor cells ERPR positive and HER2 negative by fish. The Ki-67 proliferative index averages 5-10%.    MRI was ordered for further evaluation but the patient was not able to tolerate the exam.     The patient was taken to the operating room on 10/19/2022 for left breast partial mastectomy and sentinel lymph node dissection. Final pathology confirmed the presence of a grade 2 invasive lobular carcinoma measuring 7.2 cm in greatest dimension. The anterior and inferior margins were positive. The extent of involvement of the anterior margin was 4 mm in the inferior margin was positive over less than 1 mm. The medial margin was close at 0.9 mm. 1/5 lymph nodes contained evidence of a micrometastasis measuring 1.4 mm.    The patient has been recommended adjuvant endocrine therapy with tamoxifen. She has not been recommended additional surgery. She has been referred to our department for consideration of adjuvant radiation therapy.  Oncology History   Invasive lobular carcinoma of  left breast in female   10/3/2022 Initial Diagnosis    Invasive lobular carcinoma of breast, stage 2, left     10/3/2022 Cancer Staged    Staging form: Breast, AJCC 8th Edition  - Clinical: Stage IB (cT2, cN0, cM0, G2, ER+, CO+, HER2-)       10/19/2022 Cancer Staged    Staging form: Breast, AJCC 8th Edition  - Pathologic stage from 10/19/2022: Stage IB (pT3, pN1mi(sn), cM0, G2, ER+, CO+, HER2-)       10/19/2022 Surgery    Surgeon: Dr. Johnnie Spencer  Left breast partial mastectomy and SLN     12/29/2022 - 2/1/2023 Radiation Therapy    Treating physician: Dr. Catherine Hardy  Total Dose: 57.4 Gy  Fractions: 22         RADIATION:  Treatment Summary  Course: C1 Breast 2022  Treatment Site Ref. ID Energy Dose/Fx (Gy) #Fx Dose Correction (Gy) Total Dose (Gy) Start Date End Date Elapsed Days   3D Breast_L Left Breast/IMN 6X 2.65 16 / 16 0 42.4 12/29/2022 1/24/2023 26   3D Sclav_L Sclav_L/Ax 18X 2.65 16 / 16 0 42.4 12/29/2022 1/24/2023 26   Plan_Boost_Boost PTV__Boost 16E 2.5 6 / 6 0 15 1/25/2023 2/1/2023 7       HISTORY OF PRESENT ILLNESS: Ms. Lerner returns to clinic today for routine post-radiation follow-up.  The patient reports residual fatigue.  Otherwise she feels that she is recovering well.  She is using Lubriderm for skin care.  She is taking tamoxifen without any issues thus far.    REVIEW OF SYSTEMS:   Review of Systems   Constitutional:  Positive for malaise/fatigue.       ALLERGIES:   Review of patient's allergies indicates:  No Known Allergies    MEDICATIONS:  Current Outpatient Medications   Medication Sig    fluticasone propionate (FLONASE) 50 mcg/actuation nasal spray 1 spray (50 mcg total) by Each Nostril route once daily.    ipratropium (ATROVENT) 42 mcg (0.06 %) nasal spray USE 2 SPRAYS IN EACH NOSTRIL THREE TIMES DAILY AS NEEDED FOR RHINITIS    levothyroxine (SYNTHROID) 25 MCG tablet     losartan-hydrochlorothiazide 100-25 mg (HYZAAR) 100-25 mg per tablet Take 1 tablet by mouth every morning. 1 Tablet  Oral Every day    mirabegron (MYRBETRIQ) 50 mg Tb24 Take 50 mg by mouth once daily.    multivitamin (THERAGRAN) per tablet Take 1 tablet by mouth every morning.     rosuvastatin (CRESTOR) 10 MG tablet Take 10 mg by mouth every evening. 1 Tablet Oral Every day    tamoxifen (NOLVADEX) 20 MG Tab Take 1 tablet (20 mg total) by mouth once daily.    cetirizine (ZYRTEC) 10 MG tablet Take 1 tablet (10 mg total) by mouth once daily.    metoprolol succinate (TOPROL-XL) 25 MG 24 hr tablet 25 mg once daily.     No current facility-administered medications for this visit.           PHYSICAL EXAMINATION:  Vitals:    23 0850   BP: (!) 146/65   Pulse: 64   Temp: 97.7 °F (36.5 °C)     ECO  Body mass index is 23.85 kg/m².   Physical Exam  Constitutional:       Appearance: She is well-developed.   HENT:      Head: Normocephalic and atraumatic.   Eyes:      Conjunctiva/sclera: Conjunctivae normal.   Cardiovascular:      Rate and Rhythm: Normal rate.   Pulmonary:      Effort: Pulmonary effort is normal.   Chest:      Comments: Hyperpigmentation at the left breast.  Skin is intact.  Abdominal:      Palpations: Abdomen is soft.   Musculoskeletal:         General: Normal range of motion.      Cervical back: Normal range of motion and neck supple.   Skin:     General: Skin is warm and dry.   Neurological:      Mental Status: She is alert and oriented to person, place, and time.   Psychiatric:         Behavior: Behavior normal.         Thought Content: Thought content normal.        ASSESSMENT/PLAN:  Lola was seen today for follow-up.    Diagnoses and all orders for this visit:    Invasive lobular carcinoma of breast, stage 2, left      The patient is recovering well from the acute effects of radiation treatment.  She was advised on continued skin care with a mild moisturizer such as Lubriderm, vitamin-E oil or Eucerin.  The patient will continue endocrine therapy with tamoxifen.  She we will undergo mammogram as per routine.   I explained that we typically wait 6 months post radiation for her 1st mammogram.  Returned to clinic as needed

## 2023-03-23 ENCOUNTER — TELEPHONE (OUTPATIENT)
Dept: UROLOGY | Facility: CLINIC | Age: 83
End: 2023-03-23
Payer: MEDICARE

## 2023-04-21 DIAGNOSIS — N32.81 OAB (OVERACTIVE BLADDER): Primary | ICD-10-CM

## 2023-06-02 NOTE — PROGRESS NOTES
Subjective:       Patient ID: Lola Lerner is a 82 y.o. female.    Chief Complaint: Invasive lobular carcinoma of left breast in female    HPI  Presents for follow up of invasive lobular breast cancer diagnosis after completion of surgery  pT3 pN1mi   Completed XRT  Started tamoxifen    Today, doing well  No hot flashes.   No joint pain  No vaginal bleeding.   Appetite good and weight stable.   No pain issues.   No taking Vit D.            Oncology History:  - self detected a lump in her left breast about one month prior. She has not noted any pain, skin changes, or nipple discharge. She did report that her left nipple had changed somewhat (may be more inverted now).     - she was up to date with screening mammograms up until 2018. She required a biopsy once for a suspicious finding on mammogram but this was ultimately benign.      - 9/14/2022 Diagnostic Mammogram:  Findings:  This procedure was performed using tomosynthesis. Computer-aided detection was utilized in the interpretation of this examination.  The breasts have scattered areas of fibroglandular density.   Mammo Digital Diagnostic Bilat with Jovanny  Left  Developing Asymmetry: There is a developing asymmetry seen in the upper inner quadrant of the left breast. The asymmetry correlates with the palpable mass and skin changes reported by the patient.   Right  There is no evidence of suspicious masses, calcifications, or other abnormal findings in the right breast.  US Breast Left Limited  Left  Mass: There is a 26 mm x 33 mm x 29 mm irregularly shaped, non-parallel, hypoechoic mass with indistinct margins with shadowing seen in the upper inner quadrant of the left breast at 10 o'clock, 2 cm from the nipple. The mass correlates with the palpable mass reported by the patient.    Targeted ultrasound of the left axilla is normal.  Impression:  Left  Mass: Left breast 26 mm x 33 mm x 29 mm mass at the upper inner 10 o'clock position. Assessment: 4A -  Suspicious finding. Biopsy is recommended.   Right  There is no mammographic or sonographic evidence of malignancy in the right breast.  BI-RADS Category:   Overall: 4A - Low Suspicion for Malignancy  Recommendation:  Biopsy is recommended. I discussed these findings and recommendations with the patient and her  in detail at the time of exam.     - 9/20/2022 Breast Biopsy:  Breast, left, ultrasound-guided core needle biopsies of mass:   - Invasive lobular carcinoma       - Present in all sampled cores (6 mm in greatest contiguous dimension)       - Aparna-Pal modified SBR score 3 + 2 + 1 = 6 of 9       - Histologic grade 2 of 3       - Mitotic index = 0.1 (average mitoses per high power field) (low)   - Biomarkers, assessed by immunohistochemistry on block 1A       - Estrogen Receptor (ER):  Positive (90%; strong intensity)       - Progesterone Receptor ([place]):  Positive (70%; strong intensity)       - HER2:  Equivocal (2+)       - HER2 FISH pending; results to follow in a supplemental report       - Ki-67 proliferation index:  Averages 5-10%   - Lymphovascular invasion is not identified      - 9/29/2022 Unable to tolerate Breast MRI     - 10/19/2022 Lumpectomy and [address] LN assessment with Dr. Spencer  Pathology:  1. LEFT BREAST TO INCLUDE A SEGMENT OF SKIN WITHOUT NIPPLE, PARTIAL   MASTECTOMY:   -  Extensive invasive pleomorphic lobular carcinoma, [place] histologic   grade 2, with focal lymphovascular invasion and focal microcalcifications;   tumor size = 72 mm.  The invasive carcinoma directly extends into the   anterior, inferior, superior, medial, and lateral margins.  The invasive   carcinoma is 1.9 mm from the posterior margin.  Please note that the anterior   margin is the only final margin on this specimen.  The skin is free of tumor.     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   -  Focal atypical  ductal hyperplasia.   -  Focal flat epithelial atypia.   -  Multifocal complex sclerosing lesions with focal florid usual ductal   hyperplasia and focal intraductal micropapillomas.   -  Small fibroadenoma, tumor size = 4 mm.   -  Fibrocystic changes with columnar cell change, columnar cell hyperplasia,   adenosis with blunt duct adenosis and sclerosing adenosis, usual ductal   hyperplasia, apocrine metaplasia, microcysts, stromal fibrosis, and   microcalcifications.   -  Focal duct ectasia.   -  Moderate-to-marked arteriosclerosis.   -  Organizing biopsy site with fat necrosis, fibrosis and chronic   inflammation.   2. LEFT BREAST, ADDITIONAL SUPERIOR MARGIN, EXCISION:   -  No evidence of malignancy; there is no invasive lobular carcinoma or   lobular carcinoma in situ.   -  No breast ducts or lobules are present for evaluation.   3. LEFT BREAST, ADDITIONAL INFERIOR MARGIN, EXCISION:   -  Invasive pleomorphic lobular carcinoma, [place] histologic grade 2,   with multifocal lymphovascular invasion.  The invasive carcinoma focally   extends into the new inferior margin.   -  Focal lobular carcinoma in situ with duct extension.   -  Focal complex sclerosing lesion.   -  Fibrocystic changes with columnar cell change, columnar cell hyperplasia,   usual ductal hyperplasia, adenosis with blunt duct adenosis, apocrine   metaplasia, microcysts, stromal fibrosis and focal microcalcifications.   -  Focal duct ectasia.   4. LEFT BREAST, ADDITIONAL MEDIAL MARGIN, EXCISION:   -  Invasive pleomorphic lobular carcinoma, Huyen histologic grade 2,   with focal lymphovascular invasion.  The new medial margin is free of tumor   and the invasive carcinoma is 0.9 mm from the new medial margin.   -  Focal lobular carcinoma in situ.   -  Fibrocystic changes with columnar cell change, usual ductal hyperplasia,   and stromal fibrosis.   5. LEFT BREAST, ADDITIONAL LATERAL MARGIN, EXCISION:   -  No evidence of malignancy; there is no  residual invasive lobular carcinoma   or lobular carcinoma in situ.   -  Focal mild fibrocystic changes with columnar cell change, adenosis, and   stromal fibrosis.   6. LEFT BREAST, ADDITIONAL POSTERIOR MARGIN, EXCISION:   -  No evidence of malignancy; there is no residual invasive lobular carcinoma   or lobular carcinoma in situ.   -  No breast ducts or lobules are present for evaluation.   7. LEFT AXILLARY SENTINEL LYMPH NODES (5), EXCISIONAL BIOPSY:   -  1 out of 5 lymph nodes demonstrates micrometastatic pleomorphic lobular   carcinoma; size of metastatic deposit = 1.4 mm.  There is no evidence of   extranodal extension.   -  Mild reactive lymphoid hyperplasia, mixed pattern.   -  Mild lipomatosis.   INVASIVE CARCINOMA OF THE BREAST, RESECTION, CANCER CASE SUMMARY:   PROCEDURE:  Partial mastectomy/lumpectomy with segment of skin without nipple.   SPECIMEN LATERALITY:  Left.   TUMOR SITE:  Not specified.   TUMOR, HISTOLOGIC TYPE:  Invasive pleomorphic lobular carcinoma.   TUMOR, HISTOLOGIC GRADE (ANTONIO HISTOLOGIC SCORE):        Glandular (acinar)/tubular differentiation:  Score 3.        Nuclear pleomorphism:  Score 2.        Mitotic rate:  Score 1.        Overall grade:  Grade 2 (score 6).   TUMOR SIZE:  72 mm.   TUMOR FOCALITY:  Can not be determined.   DUCTAL CARCINOMA IN SITU (DCIS):  Not identified.   TUMOR EXTENT:        Skin:  Skin is present and uninvolved.        Nipple and skeletal muscle:  Not applicable (nipple and skeletal muscle   are absent).   LYMPHOVASCULAR INVASION:  Present.   DERMAL LYMPHOVASCULAR INVASION:  Not identified.   MICROCALCIFICATIONS:  Present in invasive carcinoma, LCIS, and non-neoplastic   tissue.   TREATMENT EFFECT IN THE BREAST:  No known presurgical therapy.   MARGIN STATUS FOR INVASIVE CARCINOMA:        MARGINS INVOLVED BY INVASIVE CARCINOMA:  The anterior and inferior   margins are involved by invasive carcinoma.             Extent of involvement of anterior margin:   4 mm.             Extent of involvement of inferior margin:  Less than 1 mm.        MARGINS UNINVOLVED BY INVASIVE CARCINOMA:  The medial, superior,   posterior and lateral margins are uninvolved by invasive carcinoma.             Distance from invasive carcinoma to medial margin:  0.9 mm.             Distance from invasive carcinoma to superior margin:  8 mm.             Distance from invasive carcinoma to posterior margin:  9.9 mm.             Distance from invasive carcinoma to lateral margin:  13 mm.   REGIONAL LYMPH NODE STATUS:  Regional lymph nodes present; tumor present in   regional lymph node.        Number of lymph nodes with macrometastasis:  0.        Number of lymph nodes with micrometastasis:  1.        Number of lymph nodes with isolated tumor cells:  0.        Size of largest willy metastatic deposit:  1.4 mm.        Extranodal extension:  Not identified.        Total number of lymph nodes examined (sentinel and nonsentinel):  5.        Number of sentinel nodes examined:  5.   DISTANT METASTASIS:  Not applicable.   PATHOLOGIC STAGE CLASSIFICATION (pTNM, AJCC 8TH EDITION):   pT3:  Tumor size = 72 mm (greater than 50 mm in greatest dimension).   pN1mi (sn):  Micrometastasis in 1 axillary sentinel lymph node.   pM: Not applicable.   ADDITIONAL FINDINGS:  Please see above final diagnoses on specimens 1-7.   BREAST BIOMARKER TESTING PERFORMED ON PREVIOUS BIOPSY, CASE [id number]   (interpreted by Dr. Esteban Grace):  Estrogen receptor (ER):  Positive   (90%; strong intensity)   Progesterone receptor (PGR):  Positive (70%; strong intensity).   HER2 by IHC:  Equivocal (2+).   HER2 by FISH:  Negative (performed at [facility]).   Ki-67 proliferation index:  Averages 5-10%.      No Oncotype sent and opted to hold off at age and with tumor characteristics  Completed adjuvant XRT to left breast 2/1/2023  Started tamoxifen 2/2023    GynHx:   Menarche around age 14 but unsure  Menopause in early 50s,  unsure  A0  Very minimal breastfeeding     FH:  No personal history of breast cancer, no family history of breast cancer. No family history of prostate, endometrial, or ovarian cancer.      SH:    Never smoker, non drinker     PMH:          Active Ambulatory Problems     Diagnosis Date Noted    Hypertension 2013    Hyperlipidemia 2013    Recurrent UTI 2016    OAB (overactive bladder) 2016    Malignant neoplasm of right kidney 2016    Invasive lobular carcinoma of breast, stage 2, left 10/03/2022              Resolved Ambulatory Problems     Diagnosis Date Noted    No Resolved Ambulatory Problems              Past Medical History:   Diagnosis Date    Urge incontinence      Vaginal delivery       Review of Systems   Constitutional:         See above   All other systems reviewed and are negative.      Objective:      Physical Exam  Vitals and nursing note reviewed.   Constitutional:       General: She is not in acute distress.     Appearance: Normal appearance. She is well-developed and normal weight. She is not ill-appearing.      Comments: Presents with her   ECOG= 0  Very pleasant   HENT:      Head: Normocephalic and atraumatic.   Eyes:      General: No scleral icterus.        Right eye: No discharge.         Left eye: No discharge.      Extraocular Movements: Extraocular movements intact.      Conjunctiva/sclera: Conjunctivae normal.      Pupils: Pupils are equal, round, and reactive to light.      Right eye: Pupil is round and reactive.      Left eye: Pupil is round and reactive.   Neck:      Thyroid: No thyromegaly.      Vascular: No JVD.      Trachea: No tracheal deviation.   Cardiovascular:      Rate and Rhythm: Normal rate and regular rhythm.      Heart sounds: Normal heart sounds. No murmur heard.    No friction rub. No gallop.   Pulmonary:      Effort: Pulmonary effort is normal. No respiratory distress.      Breath sounds: Normal breath sounds. No wheezing,  rhonchi or rales.      Comments: Breasts: left breast with hyperpigmentation (xrt changes).   No masses or LAD in either breast  Right nipple inverted.,   Abdominal:      General: Abdomen is flat. Bowel sounds are normal. There is no distension.      Palpations: Abdomen is soft. There is no mass.      Tenderness: There is no abdominal tenderness. There is no guarding or rebound.      Comments: No organomegaly   Musculoskeletal:         General: No swelling, tenderness or deformity. Normal range of motion.      Cervical back: Normal range of motion and neck supple.      Right lower leg: No edema.      Left lower leg: No edema.   Lymphadenopathy:      Head:      Right side of head: No submandibular adenopathy.      Left side of head: No submandibular adenopathy.      Cervical: No cervical adenopathy.      Right cervical: No superficial, deep or posterior cervical adenopathy.     Left cervical: No superficial, deep or posterior cervical adenopathy.      Upper Body:      Right upper body: No supraclavicular adenopathy.      Left upper body: No supraclavicular adenopathy.   Skin:     General: Skin is warm and dry.      Coloration: Skin is not jaundiced or pale.      Findings: No bruising, erythema, lesion, petechiae or rash.   Neurological:      General: No focal deficit present.      Mental Status: She is alert and oriented to person, place, and time.      Cranial Nerves: No cranial nerve deficit.      Sensory: No sensory deficit.      Motor: No weakness.      Coordination: Coordination normal.      Gait: Gait normal.      Deep Tendon Reflexes: Reflexes are normal and symmetric.   Psychiatric:         Mood and Affect: Mood normal. Mood is not anxious or depressed.         Behavior: Behavior normal.         Thought Content: Thought content normal.         Judgment: Judgment normal.       Labs:     Assessment:       Problem List Items Addressed This Visit          Oncology    Invasive lobular carcinoma of left breast in  female - Primary    Relevant Orders    Ambulatory referral/consult to Internal Medicine    CBC Oncology    Comprehensive Metabolic Panel    History of renal cell cancer    Secondary and unspecified malignant neoplasm of axilla and upper limb lymph nodes     Other Visit Diagnoses       SERM use (selective estrogen receptor modulator)                  Plan:       Breast cancer-  Overall doing well and clinically SAW.   Continue tamoxifen through 2/2028.   MMG due 9/2023  Vit D-need to check level with next labs.   GYn prn.   Colonoscopy not UTD as never had one. No issues with bowels. Encouraged to see PCP and decide if stool card more appropriate.   Refer to internal medicine as needs PCP.     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 Inked renal resection margin is negative for malignancy.    RTC 3 months- see Dr. Oconnell     HTN, Hyperlipidemia- managed by Cardiology    Route Chart for Scheduling    Med Onc Chart Routing      Follow up with physician 3 months.   Follow up with FABIOLA    Infusion scheduling note    Injection scheduling note    Labs CBC, CMP and vitamin D   Scheduling:  Preferred lab:  Lab interval:     Imaging    Pharmacy appointment    Other referrals     Additional referrals needed                  Patient is in agreement with the proposed treatment plan. All questions were answered to the patient's satisfaction. Pt knows to call clinic for any new or worsening symptoms and if anything is needed before the next clinic visit.      FREDRICK Bennett-DANIEL  Hematology & Oncology  [facility] [place] [address]  ph. [phone]  Fax. [phone]       30 minutes of total time spent on the encounter, which includes face to face time and non-face to face time preparing to see the patient (eg, review of tests), Obtaining and/or reviewing separately obtained history, Documenting  clinical information in the electronic or other health record, Independently interpreting results (not separately reported) and communicating results to the patient/family/caregiver, or Care coordination (not separately reported).

## 2023-06-05 ENCOUNTER — OFFICE VISIT (OUTPATIENT)
Dept: HEMATOLOGY/ONCOLOGY | Facility: CLINIC | Age: 83
End: 2023-06-05
Payer: MEDICARE

## 2023-06-05 ENCOUNTER — LAB VISIT (OUTPATIENT)
Dept: LAB | Facility: HOSPITAL | Age: 83
End: 2023-06-05
Attending: INTERNAL MEDICINE
Payer: MEDICARE

## 2023-06-05 VITALS
SYSTOLIC BLOOD PRESSURE: 149 MMHG | TEMPERATURE: 97 F | RESPIRATION RATE: 17 BRPM | BODY MASS INDEX: 23.36 KG/M2 | WEIGHT: 148.81 LBS | HEIGHT: 67 IN | OXYGEN SATURATION: 98 % | HEART RATE: 63 BPM | DIASTOLIC BLOOD PRESSURE: 65 MMHG

## 2023-06-05 DIAGNOSIS — C50.912 INVASIVE LOBULAR CARCINOMA OF LEFT BREAST IN FEMALE: ICD-10-CM

## 2023-06-05 DIAGNOSIS — Z79.810 SERM USE (SELECTIVE ESTROGEN RECEPTOR MODULATOR): ICD-10-CM

## 2023-06-05 DIAGNOSIS — C50.912 INVASIVE LOBULAR CARCINOMA OF LEFT BREAST IN FEMALE: Primary | ICD-10-CM

## 2023-06-05 DIAGNOSIS — Z85.528 HISTORY OF RENAL CELL CANCER: ICD-10-CM

## 2023-06-05 DIAGNOSIS — C77.3 SECONDARY AND UNSPECIFIED MALIGNANT NEOPLASM OF AXILLA AND UPPER LIMB LYMPH NODES: ICD-10-CM

## 2023-06-05 LAB
ALBUMIN SERPL BCP-MCNC: 3.6 G/DL (ref 3.5–5.2)
ALP SERPL-CCNC: 50 U/L (ref 55–135)
ALT SERPL W/O P-5'-P-CCNC: 12 U/L (ref 10–44)
ANION GAP SERPL CALC-SCNC: 8 MMOL/L (ref 8–16)
AST SERPL-CCNC: 17 U/L (ref 10–40)
BASOPHILS # BLD AUTO: 0.03 K/UL (ref 0–0.2)
BASOPHILS NFR BLD: 0.6 % (ref 0–1.9)
BILIRUB SERPL-MCNC: 0.8 MG/DL (ref 0.1–1)
BUN SERPL-MCNC: 14 MG/DL (ref 8–23)
CALCIUM SERPL-MCNC: 8.9 MG/DL (ref 8.7–10.5)
CHLORIDE SERPL-SCNC: 106 MMOL/L (ref 95–110)
CO2 SERPL-SCNC: 27 MMOL/L (ref 23–29)
CREAT SERPL-MCNC: 0.7 MG/DL (ref 0.5–1.4)
DIFFERENTIAL METHOD: ABNORMAL
EOSINOPHIL # BLD AUTO: 0.1 K/UL (ref 0–0.5)
EOSINOPHIL NFR BLD: 1.8 % (ref 0–8)
ERYTHROCYTE [DISTWIDTH] IN BLOOD BY AUTOMATED COUNT: 12.8 % (ref 11.5–14.5)
EST. GFR  (NO RACE VARIABLE): >60 ML/MIN/1.73 M^2
GLUCOSE SERPL-MCNC: 98 MG/DL (ref 70–110)
HCT VFR BLD AUTO: 38.1 % (ref 37–48.5)
HGB BLD-MCNC: 12.3 G/DL (ref 12–16)
IMM GRANULOCYTES # BLD AUTO: 0.01 K/UL (ref 0–0.04)
IMM GRANULOCYTES NFR BLD AUTO: 0.2 % (ref 0–0.5)
LYMPHOCYTES # BLD AUTO: 1.1 K/UL (ref 1–4.8)
LYMPHOCYTES NFR BLD: 22.2 % (ref 18–48)
MCH RBC QN AUTO: 30.8 PG (ref 27–31)
MCHC RBC AUTO-ENTMCNC: 32.3 G/DL (ref 32–36)
MCV RBC AUTO: 96 FL (ref 82–98)
MONOCYTES # BLD AUTO: 0.4 K/UL (ref 0.3–1)
MONOCYTES NFR BLD: 8 % (ref 4–15)
NEUTROPHILS # BLD AUTO: 3.4 K/UL (ref 1.8–7.7)
NEUTROPHILS NFR BLD: 67.2 % (ref 38–73)
NRBC BLD-RTO: 0 /100 WBC
PLATELET # BLD AUTO: 183 K/UL (ref 150–450)
PMV BLD AUTO: 10.3 FL (ref 9.2–12.9)
POTASSIUM SERPL-SCNC: 3.8 MMOL/L (ref 3.5–5.1)
PROT SERPL-MCNC: 6.8 G/DL (ref 6–8.4)
RBC # BLD AUTO: 3.99 M/UL (ref 4–5.4)
SODIUM SERPL-SCNC: 141 MMOL/L (ref 136–145)
WBC # BLD AUTO: 5.1 K/UL (ref 3.9–12.7)

## 2023-06-05 PROCEDURE — 36415 COLL VENOUS BLD VENIPUNCTURE: CPT | Performed by: INTERNAL MEDICINE

## 2023-06-05 PROCEDURE — 99999 PR PBB SHADOW E&M-EST. PATIENT-LVL IV: ICD-10-PCS | Mod: PBBFAC,,, | Performed by: NURSE PRACTITIONER

## 2023-06-05 PROCEDURE — 80053 COMPREHEN METABOLIC PANEL: CPT | Performed by: INTERNAL MEDICINE

## 2023-06-05 PROCEDURE — 99214 PR OFFICE/OUTPT VISIT, EST, LEVL IV, 30-39 MIN: ICD-10-PCS | Mod: S$PBB,,, | Performed by: NURSE PRACTITIONER

## 2023-06-05 PROCEDURE — 99214 OFFICE O/P EST MOD 30 MIN: CPT | Mod: PBBFAC | Performed by: NURSE PRACTITIONER

## 2023-06-05 PROCEDURE — 85025 COMPLETE CBC W/AUTO DIFF WBC: CPT | Performed by: INTERNAL MEDICINE

## 2023-06-05 PROCEDURE — 99214 OFFICE O/P EST MOD 30 MIN: CPT | Mod: S$PBB,,, | Performed by: NURSE PRACTITIONER

## 2023-06-05 PROCEDURE — 99999 PR PBB SHADOW E&M-EST. PATIENT-LVL IV: CPT | Mod: PBBFAC,,, | Performed by: NURSE PRACTITIONER

## 2023-07-25 DIAGNOSIS — C50.912 INVASIVE LOBULAR CARCINOMA OF BREAST, STAGE 2, LEFT: ICD-10-CM

## 2023-07-25 RX ORDER — TAMOXIFEN CITRATE 20 MG/1
TABLET ORAL
Qty: 90 TABLET | Refills: 3 | Status: SHIPPED | OUTPATIENT
Start: 2023-07-25

## 2023-08-07 NOTE — PROGRESS NOTES
Subjective:       Lola Lenrer is a 83 y.o. female who is an established patient who was self-referred for evaluation of OAB/UTIs and RCC.      She reports recurrent UTIs. About 3 times a year. She has classic UTI symptoms - dysuria, frequency. Denies fevers. Denies constipation. No h/o stones.      She also has issues with OAB. Reports urgency and frequency. Nocturia x 2. UUI common usually due to waiting to void. Denies SAMSON. She was on Vesicare 10mg, initially helpful. Changed to Sanctura, then Myrbetriq, then Oxytrol. Restarted Myrbetriq 25 in  with modest improvement.    PVR (bladder scan) - 0cc, 86cc, 56cc, 107cc    VICTORINA was done for UTI workup and noted a 3.5cm solid R renal mass. PVR 66cc. No family history of  malignancy. No gross hematuria.      CT scan shows an enhancing 4.6cm R UP renal mass. CXR showed abnormal area in R lung. CT chest showed likely inflammatory scarring, doubt metastasis.    She is now s/p R open partial nephrectomy on 16. She did well after surgery.  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 Used walker, now improved. Denies LUTS - no dysuria or other UTI symptoms. She does not currently have PCP (recently ).     2023  Recent breast cancer diagnosis. Returns with frequency. Denies dysuria. +UA. On Myrbetriq 50mg.   PVR (bladder scan) today - 16cc    2023  Less urgency. Remains on Myrbetriq 50mg. Treated for UTI in  - E coli. Nocturia x 1.         CT c/a/p  - stable 1.6cm area in RUL lung (likely inflammatory), likely post-op changes in R kidney.   CT c/a/p  - post-op changes R kidney, RUL lung area resolved, now with 2mm nodule in RML lung. CT c/a/p  - stable R kidney, micronodules in R lung  CT c/a/p  - R renal scar, stable micronodules in lung  CT a/p  - R renal scar, no mets/recurrence, stable micronodules in  lung, CXR - wnl  CT a/p 11/20 - post-op R kidney, no mets/recurrence, simple L renal cysts. Stable micronodule in RLL lung. CXR - wnl  CT a/p 11/21 - no mets/recurrence. CXR - wnl         The following portions of the patient's history were reviewed and updated as appropriate: allergies, current medications, past family history, past medical history, past social history, past surgical history and problem list.    Review of Systems  Constitutional: no fever or chills  ENT: no nasal congestion or sore throat  Respiratory: no cough or shortness of breath  Cardiovascular: no chest pain or palpitations  Gastrointestinal: no nausea or vomiting, tolerating diet  Genitourinary: as per HPI  Hematologic/Lymphatic: no easy bruising or lymphadenopathy  Musculoskeletal: no arthralgias or myalgias  Skin: no rashes or lesions  Neurological: no seizures or tremors  Behavioral/Psych: no auditory or visual hallucinations       Objective:    Vitals:   Wt 64.1 kg (141 lb 5 oz)   BMI 22.13 kg/m²     Physical Exam   General: well developed, well nourished in no acute distress  Head: normocephalic, atraumatic  Neck: supple, trachea midline, no obvious enlargement of thyroid  HEENT: EOMI, mucus membranes moist, sclera anicteric, no hearing impairment  Lungs: symmetric expansion, non-labored breathing  Skin: no rashes or lesions  Neuro: alert and oriented x 3, no gross deficits  Psych: normal judgment and insight, normal mood/affect and non-anxious  Genitourinary:   patient declined exam    Inc R flank - well healed      Lab Review    Urine analysis today in clinic shows - no urine     Lab Results   Component Value Date    WBC 5.10 06/05/2023    HGB 12.3 06/05/2023    HCT 38.1 06/05/2023    MCV 96 06/05/2023     06/05/2023     Lab Results   Component Value Date    CREATININE 0.7 06/05/2023    BUN 14 06/05/2023         Imaging  Images and reports were personally reviewed by me and discussed with patient  VICTORINA reviewed, CT reviewed        Assessment/Plan:      1. Recurrent UTI    - VICTORINA with PVR - R renal mass, mildly elevated PVR 66cc   - Recommended Estrace, she declined this.    - Improving UUI will also likely help improve UTIs     2. OAB (overactive bladder)    - Worsened previously 2/2 UTI - treated   - Myrbetriq - stopped 2/2 SE (dizziness)   - Sanctura - seems to help but severe dry mouth   - Oxytrol patch (OTC) - some improvement, using though some difficulty obtaining   - Myrbetriq 50mg. ?Gemtesa   - Bladder retraining/pelvic floor exercises handout given today - handout given again today     3. 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 Okay to stop screening - consider VICTORINA q2y (11/23).   - VICTORINA in 6 mths    4. Leg pain   - Do not suspect  related    - Improved   - No urinary symptoms    - Recent imaging of kidney (11/21) normal     5. Nocturia   - x 1   - Reduce PM fluids          Follow up in 6 months with VICTORINA

## 2023-08-08 ENCOUNTER — OFFICE VISIT (OUTPATIENT)
Dept: UROLOGY | Facility: CLINIC | Age: 83
End: 2023-08-08
Payer: MEDICARE

## 2023-08-08 VITALS — WEIGHT: 141.31 LBS | BODY MASS INDEX: 22.13 KG/M2

## 2023-08-08 DIAGNOSIS — N39.0 RECURRENT UTI: ICD-10-CM

## 2023-08-08 DIAGNOSIS — R35.1 NOCTURIA: ICD-10-CM

## 2023-08-08 DIAGNOSIS — C64.1 MALIGNANT NEOPLASM OF RIGHT KIDNEY: Primary | ICD-10-CM

## 2023-08-08 DIAGNOSIS — N32.81 OAB (OVERACTIVE BLADDER): ICD-10-CM

## 2023-08-08 PROCEDURE — 99214 PR OFFICE/OUTPT VISIT, EST, LEVL IV, 30-39 MIN: ICD-10-PCS | Mod: S$PBB,,, | Performed by: UROLOGY

## 2023-08-08 PROCEDURE — 99214 OFFICE O/P EST MOD 30 MIN: CPT | Mod: S$PBB,,, | Performed by: UROLOGY

## 2023-08-08 PROCEDURE — 99999 PR PBB SHADOW E&M-EST. PATIENT-LVL III: CPT | Mod: PBBFAC,,, | Performed by: UROLOGY

## 2023-08-08 PROCEDURE — 99213 OFFICE O/P EST LOW 20 MIN: CPT | Mod: PBBFAC | Performed by: UROLOGY

## 2023-08-08 PROCEDURE — 99999 PR PBB SHADOW E&M-EST. PATIENT-LVL III: ICD-10-PCS | Mod: PBBFAC,,, | Performed by: UROLOGY

## 2023-09-07 ENCOUNTER — LAB VISIT (OUTPATIENT)
Dept: LAB | Facility: HOSPITAL | Age: 83
End: 2023-09-07
Payer: MEDICARE

## 2023-09-07 DIAGNOSIS — C50.912 INVASIVE LOBULAR CARCINOMA OF LEFT BREAST IN FEMALE: ICD-10-CM

## 2023-09-07 LAB
ALBUMIN SERPL BCP-MCNC: 3.4 G/DL (ref 3.5–5.2)
ALP SERPL-CCNC: 50 U/L (ref 55–135)
ALT SERPL W/O P-5'-P-CCNC: 11 U/L (ref 10–44)
ANION GAP SERPL CALC-SCNC: 10 MMOL/L (ref 8–16)
AST SERPL-CCNC: 16 U/L (ref 10–40)
BILIRUB SERPL-MCNC: 0.8 MG/DL (ref 0.1–1)
BUN SERPL-MCNC: 12 MG/DL (ref 8–23)
CALCIUM SERPL-MCNC: 9.1 MG/DL (ref 8.7–10.5)
CHLORIDE SERPL-SCNC: 107 MMOL/L (ref 95–110)
CO2 SERPL-SCNC: 26 MMOL/L (ref 23–29)
CREAT SERPL-MCNC: 0.7 MG/DL (ref 0.5–1.4)
ERYTHROCYTE [DISTWIDTH] IN BLOOD BY AUTOMATED COUNT: 12.2 % (ref 11.5–14.5)
EST. GFR  (NO RACE VARIABLE): >60 ML/MIN/1.73 M^2
GLUCOSE SERPL-MCNC: 107 MG/DL (ref 70–110)
HCT VFR BLD AUTO: 37.4 % (ref 37–48.5)
HGB BLD-MCNC: 12.5 G/DL (ref 12–16)
IMM GRANULOCYTES # BLD AUTO: 0.01 K/UL (ref 0–0.04)
MCH RBC QN AUTO: 30.9 PG (ref 27–31)
MCHC RBC AUTO-ENTMCNC: 33.4 G/DL (ref 32–36)
MCV RBC AUTO: 92 FL (ref 82–98)
NEUTROPHILS # BLD AUTO: 4.6 K/UL (ref 1.8–7.7)
PLATELET # BLD AUTO: 217 K/UL (ref 150–450)
PMV BLD AUTO: 10.2 FL (ref 9.2–12.9)
POTASSIUM SERPL-SCNC: 3.9 MMOL/L (ref 3.5–5.1)
PROT SERPL-MCNC: 7 G/DL (ref 6–8.4)
RBC # BLD AUTO: 4.05 M/UL (ref 4–5.4)
SODIUM SERPL-SCNC: 143 MMOL/L (ref 136–145)
WBC # BLD AUTO: 6.18 K/UL (ref 3.9–12.7)

## 2023-09-07 PROCEDURE — 80053 COMPREHEN METABOLIC PANEL: CPT | Performed by: NURSE PRACTITIONER

## 2023-09-07 PROCEDURE — 85027 COMPLETE CBC AUTOMATED: CPT | Performed by: NURSE PRACTITIONER

## 2023-09-07 PROCEDURE — 36415 COLL VENOUS BLD VENIPUNCTURE: CPT | Performed by: NURSE PRACTITIONER

## 2023-09-14 ENCOUNTER — HOSPITAL ENCOUNTER (OUTPATIENT)
Dept: RADIOLOGY | Facility: HOSPITAL | Age: 83
Discharge: HOME | End: 2023-09-14
Attending: INTERNAL MEDICINE
Payer: MEDICARE

## 2023-09-14 ENCOUNTER — OFFICE VISIT (OUTPATIENT)
Dept: HEMATOLOGY/ONCOLOGY | Facility: CLINIC | Age: 83
End: 2023-09-14
Payer: MEDICARE

## 2023-09-14 VITALS
DIASTOLIC BLOOD PRESSURE: 60 MMHG | BODY MASS INDEX: 22.91 KG/M2 | TEMPERATURE: 97 F | RESPIRATION RATE: 17 BRPM | HEART RATE: 66 BPM | HEIGHT: 67 IN | OXYGEN SATURATION: 99 % | SYSTOLIC BLOOD PRESSURE: 136 MMHG | WEIGHT: 145.94 LBS

## 2023-09-14 DIAGNOSIS — I10 HYPERTENSION, UNSPECIFIED TYPE: ICD-10-CM

## 2023-09-14 DIAGNOSIS — Z85.528 HISTORY OF RENAL CELL CANCER: Primary | ICD-10-CM

## 2023-09-14 DIAGNOSIS — E78.2 MIXED HYPERLIPIDEMIA: ICD-10-CM

## 2023-09-14 DIAGNOSIS — N32.81 OAB (OVERACTIVE BLADDER): ICD-10-CM

## 2023-09-14 DIAGNOSIS — C50.912 INVASIVE LOBULAR CARCINOMA OF LEFT BREAST IN FEMALE: ICD-10-CM

## 2023-09-14 DIAGNOSIS — Z12.31 ENCOUNTER FOR SCREENING MAMMOGRAM FOR MALIGNANT NEOPLASM OF BREAST: ICD-10-CM

## 2023-09-14 PROCEDURE — 99214 OFFICE O/P EST MOD 30 MIN: CPT | Mod: S$PBB,,, | Performed by: INTERNAL MEDICINE

## 2023-09-14 PROCEDURE — 77063 MAMMO DIGITAL SCREENING BILAT WITH TOMO: ICD-10-PCS | Mod: 26,,, | Performed by: RADIOLOGY

## 2023-09-14 PROCEDURE — 99999 PR PBB SHADOW E&M-EST. PATIENT-LVL IV: ICD-10-PCS | Mod: PBBFAC,,, | Performed by: INTERNAL MEDICINE

## 2023-09-14 PROCEDURE — 99214 PR OFFICE/OUTPT VISIT, EST, LEVL IV, 30-39 MIN: ICD-10-PCS | Mod: S$PBB,,, | Performed by: INTERNAL MEDICINE

## 2023-09-14 PROCEDURE — 99214 OFFICE O/P EST MOD 30 MIN: CPT | Mod: PBBFAC | Performed by: INTERNAL MEDICINE

## 2023-09-14 PROCEDURE — 77063 BREAST TOMOSYNTHESIS BI: CPT | Mod: 26,,, | Performed by: RADIOLOGY

## 2023-09-14 PROCEDURE — 99999 PR PBB SHADOW E&M-EST. PATIENT-LVL IV: CPT | Mod: PBBFAC,,, | Performed by: INTERNAL MEDICINE

## 2023-09-14 PROCEDURE — 77067 SCR MAMMO BI INCL CAD: CPT | Mod: TC

## 2023-09-14 PROCEDURE — 77067 SCR MAMMO BI INCL CAD: CPT | Mod: 26,,, | Performed by: RADIOLOGY

## 2023-09-14 PROCEDURE — 77067 MAMMO DIGITAL SCREENING BILAT WITH TOMO: ICD-10-PCS | Mod: 26,,, | Performed by: RADIOLOGY

## 2023-09-14 NOTE — PROGRESS NOTES
Subjective     Patient ID: Lola Lerner is a 83 y.o. female.    Chief Complaint: Invasive lobular carcinoma of left breast in female    HPI    Follows up on Tamoxifen   Energy level is good  Weight is stable overall- she did have some weight loss over last year but notes they eat less and has had no concerns  No hot flashes  No pain complaints    Presents for follow up of invasive lobular breast cancer diagnosis after completion of surgery  pT3 pN1mi      Oncology History:  - self detected a lump in her left breast about one month prior. She has not noted any pain, skin changes, or nipple discharge. She did report that her left nipple had changed somewhat (may be more inverted now).     - she was up to date with screening mammograms up until 2018. She required a biopsy once for a suspicious finding on mammogram but this was ultimately benign.      - 9/14/2022 Diagnostic Mammogram:  Findings:  This procedure was performed using tomosynthesis. Computer-aided detection was utilized in the interpretation of this examination.  The breasts have scattered areas of fibroglandular density.   Mammo Digital Diagnostic Bilat with Jovanny  Left  Developing Asymmetry: There is a developing asymmetry seen in the upper inner quadrant of the left breast. The asymmetry correlates with the palpable mass and skin changes reported by the patient.   Right  There is no evidence of suspicious masses, calcifications, or other abnormal findings in the right breast.  US Breast Left Limited  Left  Mass: There is a 26 mm x 33 mm x 29 mm irregularly shaped, non-parallel, hypoechoic mass with indistinct margins with shadowing seen in the upper inner quadrant of the left breast at 10 o'clock, 2 cm from the nipple. The mass correlates with the palpable mass reported by the patient.    Targeted ultrasound of the left axilla is normal.  Impression:  Left  Mass: Left breast 26 mm x 33 mm x 29 mm mass at the upper inner 10 o'clock position.  Assessment: 4A - Suspicious finding. Biopsy is recommended.   Right  There is no mammographic or sonographic evidence of malignancy in the right breast.  BI-RADS Category:   Overall: 4A - Low Suspicion for Malignancy  Recommendation:  Biopsy is recommended. I discussed these findings and recommendations with the patient and her  in detail at the time of exam.     - 9/20/2022 Breast Biopsy:  Breast, left, ultrasound-guided core needle biopsies of mass:   - Invasive lobular carcinoma       - Present in all sampled cores (6 mm in greatest contiguous dimension)       - Aparna-Pal modified SBR score 3 + 2 + 1 = 6 of 9       - Histologic grade 2 of 3       - Mitotic index = 0.1 (average mitoses per high power field) (low)   - Biomarkers, assessed by immunohistochemistry on block 1A       - Estrogen Receptor (ER):  Positive (90%; strong intensity)       - Progesterone Receptor (DE):  Positive (70%; strong intensity)       - HER2:  Equivocal (2+)       - HER2 FISH pending; results to follow in a supplemental report       - Ki-67 proliferation index:  Averages 5-10%   - Lymphovascular invasion is not identified      - 9/29/2022 Unable to tolerate Breast MRI     - 10/19/2022 Lumpectomy and Dante LN assessment with Dr. Spencer  Pathology:  1. LEFT BREAST TO INCLUDE A SEGMENT OF SKIN WITHOUT NIPPLE, PARTIAL   MASTECTOMY:   -  Extensive invasive pleomorphic lobular carcinoma, [place] histologic   grade 2, with focal lymphovascular invasion and focal microcalcifications;   tumor size = 72 mm.  The invasive carcinoma directly extends into the   anterior, inferior, superior, medial, and lateral margins.  The invasive   carcinoma is 1.9 mm from the posterior margin.  Please note that the anterior   margin is the only final margin on this specimen.  The skin is free of tumor.   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   -   Focal atypical ductal hyperplasia.   -  Focal flat epithelial atypia.   -  Multifocal complex sclerosing lesions with focal florid usual ductal   hyperplasia and focal intraductal micropapillomas.   -  Small fibroadenoma, tumor size = 4 mm.   -  Fibrocystic changes with columnar cell change, columnar cell hyperplasia,   adenosis with blunt duct adenosis and sclerosing adenosis, usual ductal   hyperplasia, apocrine metaplasia, microcysts, stromal fibrosis, and   microcalcifications.   -  Focal duct ectasia.   -  Moderate-to-marked arteriosclerosis.   -  Organizing biopsy site with fat necrosis, fibrosis and chronic   inflammation.   2. LEFT BREAST, ADDITIONAL SUPERIOR MARGIN, EXCISION:   -  No evidence of malignancy; there is no invasive lobular carcinoma or   lobular carcinoma in situ.   -  No breast ducts or lobules are present for evaluation.   3. LEFT BREAST, ADDITIONAL INFERIOR MARGIN, EXCISION:   -  Invasive pleomorphic lobular carcinoma, Huyen histologic grade 2,   with multifocal lymphovascular invasion.  The invasive carcinoma focally   extends into the new inferior margin.   -  Focal lobular carcinoma in situ with duct extension.   -  Focal complex sclerosing lesion.   -  Fibrocystic changes with columnar cell change, columnar cell hyperplasia,   usual ductal hyperplasia, adenosis with blunt duct adenosis, apocrine   metaplasia, microcysts, stromal fibrosis and focal microcalcifications.   -  Focal duct ectasia.   4. LEFT BREAST, ADDITIONAL MEDIAL MARGIN, EXCISION:   -  Invasive pleomorphic lobular carcinoma, [place] histologic grade 2,   with focal lymphovascular invasion.  The new medial margin is free of tumor   and the invasive carcinoma is 0.9 mm from the new medial margin.   -  Focal lobular carcinoma in situ.   -  Fibrocystic changes with columnar cell change, usual ductal hyperplasia,   and stromal fibrosis.   5. LEFT BREAST, ADDITIONAL LATERAL MARGIN, EXCISION:   -  No evidence of  malignancy; there is no residual invasive lobular carcinoma   or lobular carcinoma in situ.   -  Focal mild fibrocystic changes with columnar cell change, adenosis, and   stromal fibrosis.   6. LEFT BREAST, ADDITIONAL POSTERIOR MARGIN, EXCISION:   -  No evidence of malignancy; there is no residual invasive lobular carcinoma   or lobular carcinoma in situ.   -  No breast ducts or lobules are present for evaluation.   7. LEFT AXILLARY SENTINEL LYMPH NODES (5), EXCISIONAL BIOPSY:   -  1 out of 5 lymph nodes demonstrates micrometastatic pleomorphic lobular   carcinoma; size of metastatic deposit = 1.4 mm.  There is no evidence of   extranodal extension.   -  Mild reactive lymphoid hyperplasia, mixed pattern.   -  Mild lipomatosis.   INVASIVE CARCINOMA OF THE BREAST, RESECTION, CANCER CASE SUMMARY:   PROCEDURE:  Partial mastectomy/lumpectomy with segment of skin without nipple.   SPECIMEN LATERALITY:  Left.   TUMOR SITE:  Not specified.   TUMOR, HISTOLOGIC TYPE:  Invasive pleomorphic lobular carcinoma.   TUMOR, HISTOLOGIC GRADE (ANTONIO HISTOLOGIC SCORE):        Glandular (acinar)/tubular differentiation:  Score 3.        Nuclear pleomorphism:  Score 2.        Mitotic rate:  Score 1.        Overall grade:  Grade 2 (score 6).   TUMOR SIZE:  72 mm.   TUMOR FOCALITY:  Can not be determined.   DUCTAL CARCINOMA IN SITU (DCIS):  Not identified.   TUMOR EXTENT:        Skin:  Skin is present and uninvolved.        Nipple and skeletal muscle:  Not applicable (nipple and skeletal muscle   are absent).   LYMPHOVASCULAR INVASION:  Present.   DERMAL LYMPHOVASCULAR INVASION:  Not identified.   MICROCALCIFICATIONS:  Present in invasive carcinoma, LCIS, and non-neoplastic   tissue.   TREATMENT EFFECT IN THE BREAST:  No known presurgical therapy.   MARGIN STATUS FOR INVASIVE CARCINOMA:        MARGINS INVOLVED BY INVASIVE CARCINOMA:  The anterior and inferior   margins are involved by invasive carcinoma.             Extent of  involvement of anterior margin:  4 mm.             Extent of involvement of inferior margin:  Less than 1 mm.        MARGINS UNINVOLVED BY INVASIVE CARCINOMA:  The medial, superior,   posterior and lateral margins are uninvolved by invasive carcinoma.             Distance from invasive carcinoma to medial margin:  0.9 mm.             Distance from invasive carcinoma to superior margin:  8 mm.             Distance from invasive carcinoma to posterior margin:  9.9 mm.             Distance from invasive carcinoma to lateral margin:  13 mm.   REGIONAL LYMPH NODE STATUS:  Regional lymph nodes present; tumor present in   regional lymph node.        Number of lymph nodes with macrometastasis:  0.        Number of lymph nodes with micrometastasis:  1.        Number of lymph nodes with isolated tumor cells:  0.        Size of largest willy metastatic deposit:  1.4 mm.        Extranodal extension:  Not identified.        Total number of lymph nodes examined (sentinel and nonsentinel):  5.        Number of sentinel nodes examined:  5.   DISTANT METASTASIS:  Not applicable.   PATHOLOGIC STAGE CLASSIFICATION (pTNM, AJCC 8TH EDITION):   pT3:  Tumor size = 72 mm (greater than 50 mm in greatest dimension).   pN1mi (sn):  Micrometastasis in 1 axillary sentinel lymph node.   pM: Not applicable.   ADDITIONAL FINDINGS:  Please see above final diagnoses on specimens 1-7.   BREAST BIOMARKER TESTING PERFORMED ON PREVIOUS BIOPSY, CASE [id number]   (interpreted by Dr. Esteban Grace):  Estrogen receptor (ER):  Positive   (90%; strong intensity)   Progesterone receptor (PGR):  Positive (70%; strong intensity).   HER2 by IHC:  Equivocal (2+).   HER2 by FISH:  Negative (performed at [facility]).   Ki-67 proliferation index:  Averages 5-10%.      GynHx:   Menarche around age 14 but unsure  Menopause in early 50s, unsure  A0  Very minimal breastfeeding     FH:  No personal history of breast cancer, no family history of breast cancer. No  family history of prostate, endometrial, or ovarian cancer.      SH:    Never smoker, non drinker     PMH:          Active Ambulatory Problems     Diagnosis Date Noted    Hypertension 07/17/2013    Hyperlipidemia 07/17/2013    Recurrent UTI 04/28/2016    OAB (overactive bladder) 04/28/2016    Malignant neoplasm of right kidney 08/17/2016    Invasive lobular carcinoma of breast, stage 2, left 10/03/2022              Resolved Ambulatory Problems     Diagnosis Date Noted    No Resolved Ambulatory Problems              Past Medical History:   Diagnosis Date    Urge incontinence      Vaginal delivery          Review of Systems   Constitutional:  Negative for activity change, appetite change, chills, fatigue, fever and unexpected weight change.   HENT:  Negative for nasal congestion, mouth sores, postnasal drip, rhinorrhea, sinus pressure/congestion and trouble swallowing.    Eyes:  Negative for visual disturbance.   Respiratory:  Negative for cough and shortness of breath.    Cardiovascular:  Negative for chest pain, palpitations and leg swelling.   Gastrointestinal:  Negative for abdominal pain, diarrhea, nausea, vomiting and reflux.   Genitourinary:  Positive for frequency. Negative for bladder incontinence, decreased urine volume, difficulty urinating, dysuria and urgency.   Musculoskeletal:  Negative for arthralgias, back pain, gait problem, joint swelling and joint deformity.   Integumentary:  Negative for rash.   Neurological:  Negative for dizziness, weakness, light-headedness, numbness and headaches.   Hematological:  Negative for adenopathy. Does not bruise/bleed easily.   Psychiatric/Behavioral:  Negative for dysphoric mood and sleep disturbance. The patient is not nervous/anxious.           Objective     Physical Exam  Vitals and nursing note reviewed.   Constitutional:       General: She is not in acute distress.     Appearance: Normal appearance. She is well-developed and normal weight. She is not  ill-appearing.      Comments: Presents with her   ECOG= 0  Very pleasant   HENT:      Head: Normocephalic and atraumatic.   Eyes:      General: No scleral icterus.        Right eye: No discharge.         Left eye: No discharge.      Extraocular Movements: Extraocular movements intact.      Conjunctiva/sclera: Conjunctivae normal.      Pupils: Pupils are equal, round, and reactive to light.      Right eye: Pupil is round and reactive.      Left eye: Pupil is round and reactive.   Neck:      Thyroid: No thyromegaly.      Vascular: No JVD.      Trachea: No tracheal deviation.   Cardiovascular:      Rate and Rhythm: Normal rate and regular rhythm.      Heart sounds: Normal heart sounds. No murmur heard.     No friction rub. No gallop.   Pulmonary:      Effort: Pulmonary effort is normal. No respiratory distress.      Breath sounds: Normal breath sounds. No wheezing, rhonchi or rales.   Abdominal:      General: Abdomen is flat. Bowel sounds are normal. There is no distension.      Palpations: Abdomen is soft. There is no mass.      Tenderness: There is no abdominal tenderness. There is no guarding or rebound.      Comments: No organomegaly   Musculoskeletal:         General: No swelling, tenderness or deformity. Normal range of motion.      Cervical back: Normal range of motion and neck supple.      Right lower leg: No edema.      Left lower leg: No edema.   Lymphadenopathy:      Head:      Right side of head: No submandibular adenopathy.      Left side of head: No submandibular adenopathy.      Cervical: No cervical adenopathy.      Right cervical: No superficial, deep or posterior cervical adenopathy.     Left cervical: No superficial, deep or posterior cervical adenopathy.      Upper Body:      Right upper body: No supraclavicular adenopathy.      Left upper body: No supraclavicular adenopathy.   Skin:     General: Skin is warm and dry.      Coloration: Skin is not jaundiced.      Findings: No bruising, lesion,  petechiae or rash.   Neurological:      General: No focal deficit present.      Mental Status: She is alert and oriented to person, place, and time.      Sensory: No sensory deficit.      Motor: No weakness.      Coordination: Coordination normal.      Gait: Gait normal.      Deep Tendon Reflexes: Reflexes are normal and symmetric.   Psychiatric:         Mood and Affect: Mood normal. Mood is not anxious or depressed.         Behavior: Behavior normal.         Thought Content: Thought content normal.         Judgment: Judgment normal.          Assessment and Plan     1. History of renal cell cancer    2. Invasive lobular carcinoma of left breast in female    3. OAB (overactive bladder)    4. Hypertension, unspecified type    5. 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 Inked renal resection margin is negative for malignancy.  Has completed NCCN surveillance    Breast cancer:   No Oncotype sent and opted to hold off at age and with tumor characteristics as was not intending to do chemotherapy  On Tamoxifen   Reviewed follow up guidelines and knows she can call for any issues  RTC 3 months     HTN, Hyperlipidemia- managed by PCP and Cardiology    Overactive bladder  Sees Urology  On appropriate medications  Admits not regularly doing PT    Route Chart for Scheduling    Med Onc Chart Routing  Urgent    Follow up with physician 6 months. Due for mammogram now- please schedule asap-- cbc, cmp prior and EP in 6 months   Follow up with FABIOLA 3 months. cbc, cmp prior and EP   Infusion scheduling note    Injection scheduling note    Labs    Imaging    Pharmacy appointment    Other referrals          25 minutes direct with patient

## 2023-12-14 ENCOUNTER — LAB VISIT (OUTPATIENT)
Dept: LAB | Facility: HOSPITAL | Age: 83
End: 2023-12-14
Attending: INTERNAL MEDICINE
Payer: MEDICARE

## 2023-12-14 ENCOUNTER — OFFICE VISIT (OUTPATIENT)
Dept: HEMATOLOGY/ONCOLOGY | Facility: CLINIC | Age: 83
End: 2023-12-14
Payer: MEDICARE

## 2023-12-14 VITALS
HEART RATE: 70 BPM | HEIGHT: 67 IN | TEMPERATURE: 97 F | DIASTOLIC BLOOD PRESSURE: 69 MMHG | WEIGHT: 147.5 LBS | BODY MASS INDEX: 23.15 KG/M2 | RESPIRATION RATE: 17 BRPM | OXYGEN SATURATION: 99 % | SYSTOLIC BLOOD PRESSURE: 151 MMHG

## 2023-12-14 DIAGNOSIS — C50.912 INVASIVE LOBULAR CARCINOMA OF LEFT BREAST IN FEMALE: Primary | ICD-10-CM

## 2023-12-14 DIAGNOSIS — Z85.528 HISTORY OF RENAL CELL CANCER: ICD-10-CM

## 2023-12-14 DIAGNOSIS — I10 PRIMARY HYPERTENSION: ICD-10-CM

## 2023-12-14 DIAGNOSIS — C50.912 INVASIVE LOBULAR CARCINOMA OF LEFT BREAST IN FEMALE: ICD-10-CM

## 2023-12-14 DIAGNOSIS — E78.49 OTHER HYPERLIPIDEMIA: ICD-10-CM

## 2023-12-14 DIAGNOSIS — C77.3 SECONDARY AND UNSPECIFIED MALIGNANT NEOPLASM OF AXILLA AND UPPER LIMB LYMPH NODES: ICD-10-CM

## 2023-12-14 DIAGNOSIS — E55.9 VITAMIN D DEFICIENCY: ICD-10-CM

## 2023-12-14 LAB
ALBUMIN SERPL BCP-MCNC: 3.4 G/DL (ref 3.5–5.2)
ALP SERPL-CCNC: 48 U/L (ref 55–135)
ALT SERPL W/O P-5'-P-CCNC: 14 U/L (ref 10–44)
ANION GAP SERPL CALC-SCNC: 5 MMOL/L (ref 8–16)
AST SERPL-CCNC: 20 U/L (ref 10–40)
BASOPHILS # BLD AUTO: 0.03 K/UL (ref 0–0.2)
BASOPHILS NFR BLD: 0.4 % (ref 0–1.9)
BILIRUB SERPL-MCNC: 0.7 MG/DL (ref 0.1–1)
BUN SERPL-MCNC: 14 MG/DL (ref 8–23)
CALCIUM SERPL-MCNC: 8.8 MG/DL (ref 8.7–10.5)
CHLORIDE SERPL-SCNC: 110 MMOL/L (ref 95–110)
CO2 SERPL-SCNC: 28 MMOL/L (ref 23–29)
CREAT SERPL-MCNC: 0.7 MG/DL (ref 0.5–1.4)
DIFFERENTIAL METHOD: NORMAL
EOSINOPHIL # BLD AUTO: 0.1 K/UL (ref 0–0.5)
EOSINOPHIL NFR BLD: 1 % (ref 0–8)
ERYTHROCYTE [DISTWIDTH] IN BLOOD BY AUTOMATED COUNT: 12.9 % (ref 11.5–14.5)
EST. GFR  (NO RACE VARIABLE): >60 ML/MIN/1.73 M^2
GLUCOSE SERPL-MCNC: 90 MG/DL (ref 70–110)
HCT VFR BLD AUTO: 37.1 % (ref 37–48.5)
HGB BLD-MCNC: 13 G/DL (ref 12–16)
IMM GRANULOCYTES # BLD AUTO: 0.01 K/UL (ref 0–0.04)
IMM GRANULOCYTES NFR BLD AUTO: 0.1 % (ref 0–0.5)
LYMPHOCYTES # BLD AUTO: 1.6 K/UL (ref 1–4.8)
LYMPHOCYTES NFR BLD: 22.9 % (ref 18–48)
MCH RBC QN AUTO: 30.8 PG (ref 27–31)
MCHC RBC AUTO-ENTMCNC: 35 G/DL (ref 32–36)
MCV RBC AUTO: 88 FL (ref 82–98)
MONOCYTES # BLD AUTO: 0.6 K/UL (ref 0.3–1)
MONOCYTES NFR BLD: 8.2 % (ref 4–15)
NEUTROPHILS # BLD AUTO: 4.7 K/UL (ref 1.8–7.7)
NEUTROPHILS NFR BLD: 67.4 % (ref 38–73)
NRBC BLD-RTO: 0 /100 WBC
PLATELET # BLD AUTO: 177 K/UL (ref 150–450)
PMV BLD AUTO: 9.9 FL (ref 9.2–12.9)
POTASSIUM SERPL-SCNC: 4.4 MMOL/L (ref 3.5–5.1)
PROT SERPL-MCNC: 6.6 G/DL (ref 6–8.4)
RBC # BLD AUTO: 4.22 M/UL (ref 4–5.4)
SODIUM SERPL-SCNC: 143 MMOL/L (ref 136–145)
WBC # BLD AUTO: 6.93 K/UL (ref 3.9–12.7)

## 2023-12-14 PROCEDURE — 99999 PR PBB SHADOW E&M-EST. PATIENT-LVL III: ICD-10-PCS | Mod: PBBFAC,,, | Performed by: NURSE PRACTITIONER

## 2023-12-14 PROCEDURE — 99213 OFFICE O/P EST LOW 20 MIN: CPT | Mod: PBBFAC | Performed by: NURSE PRACTITIONER

## 2023-12-14 PROCEDURE — 99999 PR PBB SHADOW E&M-EST. PATIENT-LVL III: CPT | Mod: PBBFAC,,, | Performed by: NURSE PRACTITIONER

## 2023-12-14 PROCEDURE — 85025 COMPLETE CBC W/AUTO DIFF WBC: CPT | Performed by: INTERNAL MEDICINE

## 2023-12-14 PROCEDURE — 80053 COMPREHEN METABOLIC PANEL: CPT | Performed by: INTERNAL MEDICINE

## 2023-12-14 PROCEDURE — 99214 PR OFFICE/OUTPT VISIT, EST, LEVL IV, 30-39 MIN: ICD-10-PCS | Mod: S$PBB,,, | Performed by: NURSE PRACTITIONER

## 2023-12-14 PROCEDURE — 99214 OFFICE O/P EST MOD 30 MIN: CPT | Mod: S$PBB,,, | Performed by: NURSE PRACTITIONER

## 2023-12-14 PROCEDURE — 36415 COLL VENOUS BLD VENIPUNCTURE: CPT | Performed by: INTERNAL MEDICINE

## 2023-12-14 NOTE — PROGRESS NOTES
Subjective     Patient ID: Lola Lerner is a 83 y.o. female.    Chief Complaint: Invasive lobular carcinoma of left breast in female    HPI    Follows up on Tamoxifen   Tolerating well.   Appetite good and weight overall stable.   No pain issues.   No hot flashes.   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 She has not noted any pain, skin changes, or nipple discharge. She did report that her left nipple had changed somewhat (may be more inverted now).     - she was up to date with screening mammograms up until 2018. She required a biopsy once for a suspicious finding on mammogram but this was ultimately benign.      - 9/14/2022 Diagnostic Mammogram:  Findings:  This procedure was performed using tomosynthesis. Computer-aided detection was utilized in the interpretation of this examination.  The breasts have scattered areas of fibroglandular density.   Mammo Digital Diagnostic Bilat with Jovanny  Left  Developing Asymmetry: There is a developing asymmetry seen in the upper inner quadrant of the left breast. The asymmetry correlates with the palpable mass and skin changes reported by the patient.   Right  There is no evidence of suspicious masses, calcifications, or other abnormal findings in the right breast.  US Breast Left Limited  Left  Mass: There is a 26 mm x 33 mm x 29 mm irregularly shaped, non-parallel, hypoechoic mass with indistinct margins with shadowing seen in the upper inner quadrant of the left breast at 10 o'clock, 2 cm from the nipple. The mass correlates with the palpable mass reported by the patient.    Targeted ultrasound of the left axilla is normal.  Impression:  Left  Mass: Left breast 26 mm x 33 mm x 29 mm mass at the upper inner 10 o'clock position. Assessment: 4A -  Suspicious finding. Biopsy is recommended.   Right  There is no mammographic or sonographic evidence of malignancy in the right breast.  BI-RADS Category:   Overall: 4A - Low Suspicion for Malignancy  Recommendation:  Biopsy is recommended. I discussed these findings and recommendations with the patient and her  in detail at the time of exam.     - 9/20/2022 Breast Biopsy:  Breast, left, ultrasound-guided core needle biopsies of mass:   - Invasive lobular carcinoma       - Present in all sampled cores (6 mm in greatest contiguous dimension)       - Aparna-Pal modified SBR score 3 + 2 + 1 = 6 of 9       - Histologic grade 2 of 3       - Mitotic index = 0.1 (average mitoses per high power field) (low)   - Biomarkers, assessed by immunohistochemistry on block 1A       - Estrogen Receptor (ER):  Positive (90%; strong intensity)       - Progesterone Receptor (DC):  Positive (70%; strong intensity)       - HER2:  Equivocal (2+)       - HER2 FISH pending; results to follow in a supplemental report       - Ki-67 proliferation index:  Averages 5-10%   - Lymphovascular invasion is not identified      - 9/29/2022 Unable to tolerate Breast MRI     - 10/19/2022 Lumpectomy and [address] assessment with Dr. Spencer  Pathology:  1. LEFT BREAST TO INCLUDE A SEGMENT OF SKIN WITHOUT NIPPLE, PARTIAL   MASTECTOMY:   -  Extensive invasive pleomorphic lobular carcinoma, Carlton histologic   grade 2, with focal lymphovascular invasion and focal microcalcifications;   tumor size = 72 mm.  The invasive carcinoma directly extends into the   anterior, inferior, superior, medial, and lateral margins.  The invasive   carcinoma is 1.9 mm from the posterior margin.  Please note that the anterior   margin is the only final margin on this specimen.  The skin is free of tumor.   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   -  Focal atypical  ductal hyperplasia.   -  Focal flat epithelial atypia.   -  Multifocal complex sclerosing lesions with focal florid usual ductal   hyperplasia and focal intraductal micropapillomas.   -  Small fibroadenoma, tumor size = 4 mm.   -  Fibrocystic changes with columnar cell change, columnar cell hyperplasia,   adenosis with blunt duct adenosis and sclerosing adenosis, usual ductal   hyperplasia, apocrine metaplasia, microcysts, stromal fibrosis, and   microcalcifications.   -  Focal duct ectasia.   -  Moderate-to-marked arteriosclerosis.   -  Organizing biopsy site with fat necrosis, fibrosis and chronic   inflammation.   2. LEFT BREAST, ADDITIONAL SUPERIOR MARGIN, EXCISION:   -  No evidence of malignancy; there is no invasive lobular carcinoma or   lobular carcinoma in situ.   -  No breast ducts or lobules are present for evaluation.   3. LEFT BREAST, ADDITIONAL INFERIOR MARGIN, EXCISION:   -  Invasive pleomorphic lobular carcinoma, Huyen histologic grade 2,   with multifocal lymphovascular invasion.  The invasive carcinoma focally   extends into the new inferior margin.   -  Focal lobular carcinoma in situ with duct extension.   -  Focal complex sclerosing lesion.   -  Fibrocystic changes with columnar cell change, columnar cell hyperplasia,   usual ductal hyperplasia, adenosis with blunt duct adenosis, apocrine   metaplasia, microcysts, stromal fibrosis and focal microcalcifications.   -  Focal duct ectasia.   4. LEFT BREAST, ADDITIONAL MEDIAL MARGIN, EXCISION:   -  Invasive pleomorphic lobular carcinoma, Huyen histologic grade 2,   with focal lymphovascular invasion.  The new medial margin is free of tumor   and the invasive carcinoma is 0.9 mm from the new medial margin.   -  Focal lobular carcinoma in situ.   -  Fibrocystic changes with columnar cell change, usual ductal hyperplasia,   and stromal fibrosis.   5. LEFT BREAST, ADDITIONAL LATERAL MARGIN, EXCISION:   -  No evidence of malignancy; there is no  residual invasive lobular carcinoma   or lobular carcinoma in situ.   -  Focal mild fibrocystic changes with columnar cell change, adenosis, and   stromal fibrosis.   6. LEFT BREAST, ADDITIONAL POSTERIOR MARGIN, EXCISION:   -  No evidence of malignancy; there is no residual invasive lobular carcinoma   or lobular carcinoma in situ.   -  No breast ducts or lobules are present for evaluation.   7. LEFT AXILLARY SENTINEL LYMPH NODES (5), EXCISIONAL BIOPSY:   -  1 out of 5 lymph nodes demonstrates micrometastatic pleomorphic lobular   carcinoma; size of metastatic deposit = 1.4 mm.  There is no evidence of   extranodal extension.   -  Mild reactive lymphoid hyperplasia, mixed pattern.   -  Mild lipomatosis.   INVASIVE CARCINOMA OF THE BREAST, RESECTION, CANCER CASE SUMMARY:   PROCEDURE:  Partial mastectomy/lumpectomy with segment of skin without nipple.   SPECIMEN LATERALITY:  Left.   TUMOR SITE:  Not specified.   TUMOR, HISTOLOGIC TYPE:  Invasive pleomorphic lobular carcinoma.   TUMOR, HISTOLOGIC GRADE (ANTONIO HISTOLOGIC SCORE):        Glandular (acinar)/tubular differentiation:  Score 3.        Nuclear pleomorphism:  Score 2.        Mitotic rate:  Score 1.        Overall grade:  Grade 2 (score 6).   TUMOR SIZE:  72 mm.   TUMOR FOCALITY:  Can not be determined.   DUCTAL CARCINOMA IN SITU (DCIS):  Not identified.   TUMOR EXTENT:        Skin:  Skin is present and uninvolved.        Nipple and skeletal muscle:  Not applicable (nipple and skeletal muscle   are absent).   LYMPHOVASCULAR INVASION:  Present.   DERMAL LYMPHOVASCULAR INVASION:  Not identified.   MICROCALCIFICATIONS:  Present in invasive carcinoma, LCIS, and non-neoplastic   tissue.   TREATMENT EFFECT IN THE BREAST:  No known presurgical therapy.   MARGIN STATUS FOR INVASIVE CARCINOMA:        MARGINS INVOLVED BY INVASIVE CARCINOMA:  The anterior and inferior   margins are involved by invasive carcinoma.             Extent of involvement of anterior margin:   4 mm.             Extent of involvement of inferior margin:  Less than 1 mm.        MARGINS UNINVOLVED BY INVASIVE CARCINOMA:  The medial, superior,   posterior and lateral margins are uninvolved by invasive carcinoma.             Distance from invasive carcinoma to medial margin:  0.9 mm.             Distance from invasive carcinoma to superior margin:  8 mm.             Distance from invasive carcinoma to posterior margin:  9.9 mm.             Distance from invasive carcinoma to lateral margin:  13 mm.   REGIONAL LYMPH NODE STATUS:  Regional lymph nodes present; tumor present in   regional lymph node.        Number of lymph nodes with macrometastasis:  0.        Number of lymph nodes with micrometastasis:  1.        Number of lymph nodes with isolated tumor cells:  0.        Size of largest willy metastatic deposit:  1.4 mm.        Extranodal extension:  Not identified.        Total number of lymph nodes examined (sentinel and nonsentinel):  5.        Number of sentinel nodes examined:  5.   DISTANT METASTASIS:  Not applicable.   PATHOLOGIC STAGE CLASSIFICATION (pTNM, AJCC 8TH EDITION):   pT3:  Tumor size = 72 mm (greater than 50 mm in greatest dimension).   pN1mi (sn):  Micrometastasis in 1 axillary sentinel lymph node.   pM: Not applicable.   ADDITIONAL FINDINGS:  Please see above final diagnoses on specimens 1-7.   BREAST BIOMARKER TESTING PERFORMED ON PREVIOUS BIOPSY, CASE [id number]   (interpreted by Dr. Esteban Grace):  Estrogen receptor (ER):  Positive   (90%; strong intensity)   Progesterone receptor (PGR):  Positive (70%; strong intensity).   HER2 by IHC:  Equivocal (2+).   HER2 by FISH:  Negative (performed at [facility]).   Ki-67 proliferation index:  Averages 5-10%.        No Oncotype sent and opted to hold off at age and with tumor characteristics as was not intending to do chemotherapy    GynHx:   Menarche around age 14 but unsure  Menopause in early 50s, unsure  A0  Very minimal  breastfeeding     FH:  No personal history of breast cancer, no family history of breast cancer. No family history of prostate, endometrial, or ovarian cancer.      SH:    Never smoker, non drinker     PMH:          Active Ambulatory Problems     Diagnosis Date Noted    Hypertension 07/17/2013    Hyperlipidemia 07/17/2013    Recurrent UTI 04/28/2016    OAB (overactive bladder) 04/28/2016    Malignant neoplasm of right kidney 08/17/2016    Invasive lobular carcinoma of breast, stage 2, left 10/03/2022              Resolved Ambulatory Problems     Diagnosis Date Noted    No Resolved Ambulatory Problems              Past Medical History:   Diagnosis Date    Urge incontinence      Vaginal delivery          Review of Systems   Constitutional:         See above   All other systems reviewed and are negative.         Objective     Physical Exam  Vitals and nursing note reviewed.   Constitutional:       General: She is not in acute distress.     Appearance: Normal appearance. She is well-developed and normal weight. She is not ill-appearing.      Comments: Presents with her   ECOG= 0  Very pleasant   HENT:      Head: Normocephalic and atraumatic.   Eyes:      General: No scleral icterus.        Right eye: No discharge.         Left eye: No discharge.      Extraocular Movements: Extraocular movements intact.      Conjunctiva/sclera: Conjunctivae normal.      Pupils: Pupils are equal, round, and reactive to light.      Right eye: Pupil is round and reactive.      Left eye: Pupil is round and reactive.   Neck:      Thyroid: No thyromegaly.      Vascular: No JVD.      Trachea: No tracheal deviation.   Cardiovascular:      Rate and Rhythm: Normal rate and regular rhythm.      Heart sounds: Normal heart sounds. No murmur heard.     No friction rub. No gallop.   Pulmonary:      Effort: Pulmonary effort is normal. No respiratory distress.      Breath sounds: Normal breath sounds. No wheezing, rhonchi or rales.       Comments: No breast masses or LAD./   Left breast with healed incision   Abdominal:      General: Abdomen is flat. Bowel sounds are normal. There is no distension.      Palpations: Abdomen is soft. There is no mass.      Tenderness: There is no abdominal tenderness. There is no guarding or rebound.      Comments: No organomegaly   Musculoskeletal:         General: No swelling, tenderness or deformity. Normal range of motion.      Cervical back: Normal range of motion and neck supple.      Right lower leg: No edema.      Left lower leg: No edema.   Lymphadenopathy:      Head:      Right side of head: No submandibular adenopathy.      Left side of head: No submandibular adenopathy.      Cervical: No cervical adenopathy.      Right cervical: No superficial, deep or posterior cervical adenopathy.     Left cervical: No superficial, deep or posterior cervical adenopathy.      Upper Body:      Right upper body: No supraclavicular adenopathy.      Left upper body: No supraclavicular adenopathy.   Skin:     General: Skin is warm and dry.      Coloration: Skin is not jaundiced.      Findings: No bruising, lesion, petechiae or rash.   Neurological:      General: No focal deficit present.      Mental Status: She is alert and oriented to person, place, and time.      Sensory: No sensory deficit.      Motor: No weakness.      Coordination: Coordination normal.      Gait: Gait normal.      Deep Tendon Reflexes: Reflexes are normal and symmetric.   Psychiatric:         Mood and Affect: Mood normal. Mood is not anxious or depressed.         Behavior: Behavior normal.         Thought Content: Thought content normal.         Judgment: Judgment normal.       Labs: reviewed      Assessment and Plan     1. Invasive lobular carcinoma of left breast in female  -     CBC Oncology; Future  -     Comprehensive Metabolic Panel; Future  -     Vitamin D; Future; Expected date: 12/14/2023    2. Secondary and unspecified malignant neoplasm of  axilla and upper limb lymph nodes    3. History of renal cell cancer    4. Primary hypertension    5. Other hyperlipidemia    6. 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 Inked renal resection margin is negative for malignancy.  Has completed NCCN surveillance    HTN, Hyperlipidemia- managed by PCP and Cardiology        Route Chart for Scheduling    Med Onc Chart Routing      Follow up with physician 3 months.   Follow up with [facility]    Infusion scheduling note    Injection scheduling note    Labs CBC, CMP and vitamin D   Scheduling:  Preferred lab:  Lab interval:     Imaging    Pharmacy appointment    Other referrals                    Patient is in agreement with the proposed treatment plan. All questions were answered to the patient's satisfaction. Pt knows to call clinic for any new or worsening symptoms and if anything is needed before the next clinic visit.    Aureliano Mishra, MSN, APRN, FNP-C  Nurse Practitioner to Dr. Janel Oconnell  [facility]  [facility]  Hematology & Medical Oncology  [address] [place] [id number]  ph. [phone] ext [phone]  Fax. [phone]              30 minutes of total time spent on the encounter, which includes face to face time and non-face to face time preparing to see the patient (eg, review of tests), Obtaining and/or reviewing separately obtained history, Documenting clinical information in the electronic or other health record, Independently interpreting results (not separately reported) and communicating results to the patient/family/caregiver, or Care  coordination (not separately reported).

## 2024-01-09 ENCOUNTER — TELEPHONE (OUTPATIENT)
Dept: HEMATOLOGY/ONCOLOGY | Facility: CLINIC | Age: 84
End: 2024-01-09
Payer: MEDICARE

## 2024-01-09 NOTE — TELEPHONE ENCOUNTER
Spoke with .   Follow up with md in 3 months.     He thanked me for returning his call.     ----- Message from Carol Ann Arce sent at 1/9/2024  2:43 PM CST -----  Regarding: Patient advice  Contact: Finesse  [phone]          Caller:  Finesse     Returning call to:  Ginny     Caller can be reached at:  [phone]    Nature of the call:  Returning missed call regarding to information given at last visit

## 2024-01-09 NOTE — TELEPHONE ENCOUNTER
Returned call to pt spouse. No answer. Left voicemail  ----- Message from Carol Ann Arce sent at 1/8/2024  4:39 PM CST -----  Regarding: Patient advice  Contact: Finesse [phone]          Name of Caller:  Finesse pt's spouse     Contact Preference:  [phone]     Nature of Call:  Requesting a call back regarding to information given at last visit need to confirm it

## 2024-02-14 ENCOUNTER — HOSPITAL ENCOUNTER (OUTPATIENT)
Dept: RADIOLOGY | Facility: HOSPITAL | Age: 84
Discharge: HOME | End: 2024-02-14
Attending: UROLOGY
Payer: MEDICARE

## 2024-02-14 DIAGNOSIS — N32.81 OAB (OVERACTIVE BLADDER): ICD-10-CM

## 2024-02-14 DIAGNOSIS — C64.1 MALIGNANT NEOPLASM OF RIGHT KIDNEY: ICD-10-CM

## 2024-02-14 PROCEDURE — 76770 US EXAM ABDO BACK WALL COMP: CPT | Mod: TC

## 2024-02-14 PROCEDURE — 76770 US EXAM ABDO BACK WALL COMP: CPT | Mod: 26,,, | Performed by: INTERNAL MEDICINE

## 2024-02-16 NOTE — PROGRESS NOTES
Subjective:       Lola Lerner is a 83 y.o. female who is an established patient who was self-referred for evaluation of OAB/UTIs and RCC.      She reports recurrent UTIs. About 3 times a year. She has classic UTI symptoms - dysuria, frequency. Denies fevers. Denies constipation. No h/o stones.      She also has issues with OAB. Reports urgency and frequency. Nocturia x 2. UUI common usually due to waiting to void. Denies SAMSON. She was on Vesicare 10mg, initially helpful. Changed to Sanctura, then Myrbetriq, then Oxytrol. Restarted Myrbetriq 25 in  with modest improvement.    PVR (bladder scan) - 0cc, 86cc, 56cc, 107cc    VICTORINA was done for UTI workup and noted a 3.5cm solid R renal mass. PVR 66cc. No family history of  malignancy. No gross hematuria.      CT scan shows an enhancing 4.6cm R UP renal mass. CXR showed abnormal area in R lung. CT chest showed likely inflammatory scarring, doubt metastasis.    She is now s/p R open partial nephrectomy on 16. She did well after surgery.  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 Used walker, now improved. Denies LUTS - no dysuria or other UTI symptoms. She does not currently have PCP (recently ).     2023  Recent breast cancer diagnosis. Returns with frequency. Denies dysuria. +UA. On Myrbetriq 50mg.   PVR (bladder scan) today - 16cc    2023  Less urgency. Remains on Myrbetriq 50mg. Treated for UTI in  - E coli. Nocturia x 1.     2024  Recent VICTORINA normal. No recent UTIs. Remains on Myrbetriq 50mg. +UUI, mainly at night. Doing Kegels x 2 weeks, no change.       CT c/a/p  - stable 1.6cm area in RUL lung (likely inflammatory), likely post-op changes in R kidney.   CT c/a/p  - post-op changes R kidney, RUL lung area resolved, now with 2mm nodule in RML lung. CT c/a/p  - stable R kidney, micronodules in R lung  CT  c/a/p 11/18 - R renal scar, stable micronodules in lung  CT a/p 11/19 - R renal scar, no mets/recurrence, stable micronodules in lung, CXR - wnl  CT a/p 11/20 - post-op R kidney, no mets/recurrence, simple L renal cysts. Stable micronodule in RLL lung. CXR - wnl  CT a/p 11/21 - no mets/recurrence. CXR - wnl  VICTORINA 2/24 - no new renal mass/stone. R partial.          The following portions of the patient's history were reviewed and updated as appropriate: allergies, current medications, past family history, past medical history, past social history, past surgical history and problem list.    Review of Systems  Constitutional: no fever or chills  ENT: no nasal congestion or sore throat  Respiratory: no cough or shortness of breath  Cardiovascular: no chest pain or palpitations  Gastrointestinal: no nausea or vomiting, tolerating diet  Genitourinary: as per HPI  Hematologic/Lymphatic: no easy bruising or lymphadenopathy  Musculoskeletal: no arthralgias or myalgias  Skin: no rashes or lesions  Neurological: no seizures or tremors  Behavioral/Psych: no auditory or visual hallucinations       Objective:    Vitals:   There were no vitals taken for this visit.    Physical Exam   General: well developed, well nourished in no acute distress  Head: normocephalic, atraumatic  Neck: supple, trachea midline, no obvious enlargement of thyroid  HEENT: EOMI, mucus membranes moist, sclera anicteric, no hearing impairment  Lungs: symmetric expansion, non-labored breathing  Skin: no rashes or lesions  Neuro: alert and oriented x 3, no gross deficits  Psych: normal judgment and insight, normal mood/affect and non-anxious  Genitourinary: deferred    Inc R flank - well healed      Lab Review    Urine analysis today in clinic shows - no urine     Lab Results   Component Value Date    WBC 6.93 12/14/2023    HGB 13.0 12/14/2023    HCT 37.1 12/14/2023    MCV 88 12/14/2023     12/14/2023     Lab Results   Component Value Date    CREATININE  0.7 12/14/2023    BUN 14 12/14/2023         Imaging  Images and reports were personally reviewed by me and discussed with patient  VICTORINA reviewed, CT reviewed       Assessment/Plan:      1. Recurrent UTI    - VICTORINA with PVR - R renal mass, mildly elevated PVR 66cc   - Recommended Estrace, she declined this.    - Improving UUI will also likely help improve UTIs     2. OAB (overactive bladder)    - Worsened previously 2/2 UTI - treated   - Myrbetriq - stopped 2/2 SE (dizziness)   - Sanctura - seems to help but severe dry mouth   - Oxytrol patch (OTC) - some improvement, using though some difficulty obtaining   - Bladder retraining/pelvic floor exercises handout given previously - handout given again   - Trial Gemtesa. Stop Myrbetriq.      3. 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 Okay to stop screening - okay to cont VICTORINA q2y    - VICTORINA in 2 yr (2/26)    4. Leg pain   - Do not suspect  related    - Improved   - No urinary symptoms    - Recent imaging of kidney (11/21) normal     5. Nocturia   - x 1   - Reduce PM fluids          Follow up in 6 months

## 2024-02-19 ENCOUNTER — OFFICE VISIT (OUTPATIENT)
Dept: UROLOGY | Facility: CLINIC | Age: 84
End: 2024-02-19
Payer: MEDICARE

## 2024-02-19 DIAGNOSIS — R35.1 NOCTURIA: ICD-10-CM

## 2024-02-19 DIAGNOSIS — C64.1 MALIGNANT NEOPLASM OF RIGHT KIDNEY: ICD-10-CM

## 2024-02-19 DIAGNOSIS — N32.81 OAB (OVERACTIVE BLADDER): Primary | ICD-10-CM

## 2024-02-19 DIAGNOSIS — N39.0 RECURRENT UTI: ICD-10-CM

## 2024-02-19 PROCEDURE — 99213 OFFICE O/P EST LOW 20 MIN: CPT | Mod: PBBFAC | Performed by: UROLOGY

## 2024-02-19 PROCEDURE — 99999 PR PBB SHADOW E&M-EST. PATIENT-LVL III: CPT | Mod: PBBFAC,,, | Performed by: UROLOGY

## 2024-02-19 PROCEDURE — 99214 OFFICE O/P EST MOD 30 MIN: CPT | Mod: S$PBB,,, | Performed by: UROLOGY

## 2024-02-19 RX ORDER — VIBEGRON 75 MG/1
75 TABLET, FILM COATED ORAL DAILY
Qty: 90 TABLET | Refills: 3 | Status: SHIPPED | OUTPATIENT
Start: 2024-02-19

## 2024-02-27 DIAGNOSIS — Z00.00 ENCOUNTER FOR MEDICARE ANNUAL WELLNESS EXAM: ICD-10-CM

## 2024-06-25 ENCOUNTER — OFFICE VISIT (OUTPATIENT)
Dept: INTERNAL MEDICINE | Facility: CLINIC | Age: 84
End: 2024-06-25
Payer: MEDICARE

## 2024-06-25 VITALS
DIASTOLIC BLOOD PRESSURE: 62 MMHG | BODY MASS INDEX: 23.39 KG/M2 | WEIGHT: 149.06 LBS | SYSTOLIC BLOOD PRESSURE: 152 MMHG | HEIGHT: 67 IN | HEART RATE: 63 BPM | OXYGEN SATURATION: 99 %

## 2024-06-25 DIAGNOSIS — E78.2 MIXED HYPERLIPIDEMIA: ICD-10-CM

## 2024-06-25 DIAGNOSIS — I10 HYPERTENSION, UNSPECIFIED TYPE: ICD-10-CM

## 2024-06-25 DIAGNOSIS — Z85.528 HISTORY OF RENAL CELL CANCER: ICD-10-CM

## 2024-06-25 DIAGNOSIS — C77.3 SECONDARY AND UNSPECIFIED MALIGNANT NEOPLASM OF AXILLA AND UPPER LIMB LYMPH NODES: ICD-10-CM

## 2024-06-25 DIAGNOSIS — R41.89 COGNITIVE IMPAIRMENT: ICD-10-CM

## 2024-06-25 DIAGNOSIS — Z00.00 ENCOUNTER FOR MEDICARE ANNUAL WELLNESS EXAM: Primary | ICD-10-CM

## 2024-06-25 DIAGNOSIS — I70.0 AORTIC ATHEROSCLEROSIS: ICD-10-CM

## 2024-06-25 DIAGNOSIS — C50.912 INVASIVE LOBULAR CARCINOMA OF LEFT BREAST IN FEMALE: ICD-10-CM

## 2024-06-25 PROCEDURE — 99214 OFFICE O/P EST MOD 30 MIN: CPT | Mod: PBBFAC | Performed by: PHYSICIAN ASSISTANT

## 2024-06-25 PROCEDURE — G0439 PPPS, SUBSEQ VISIT: HCPCS | Mod: ,,, | Performed by: PHYSICIAN ASSISTANT

## 2024-06-25 PROCEDURE — 99999 PR PBB SHADOW E&M-EST. PATIENT-LVL IV: CPT | Mod: PBBFAC,,, | Performed by: PHYSICIAN ASSISTANT

## 2024-06-25 NOTE — PROGRESS NOTES
"  Lola Lerner presented for an initial Medicare AWV today. The following components were reviewed and updated:    Medical history  Family History  Social history  Allergies and Current Medications  Health Risk Assessment  Health Maintenance  Care Team    **See Completed Assessments for Annual Wellness visit with in the encounter summary    The following assessments were completed:  Depression Screening  Cognitive function Screening  Timed Get Up Test  Whisper Test      Opioid documentation:      Patient does not have a current opioid prescription.          Vitals:    06/25/24 0810 06/25/24 0847   BP: (!) 154/86 (!) 152/62   BP Location: Left arm    Patient Position: Sitting    BP Method: Medium (Manual)    Pulse: 63    SpO2: 99%    Weight: 67.6 kg (149 lb 0.5 oz)    Height: 5' 7" (1.702 m)      Body mass index is 23.34 kg/m².       Physical Exam  Constitutional:       General: She is not in acute distress.     Appearance: She is not ill-appearing.   HENT:      Head: Normocephalic and atraumatic.      Right Ear: Tympanic membrane, ear canal and external ear normal.      Left Ear: Tympanic membrane, ear canal and external ear normal.   Cardiovascular:      Rate and Rhythm: Normal rate and regular rhythm.      Heart sounds: Murmur heard.   Pulmonary:      Effort: Pulmonary effort is normal. No respiratory distress.      Breath sounds: No wheezing.   Lymphadenopathy:      Cervical: No cervical adenopathy.   Neurological:      Mental Status: She is alert. Mental status is at baseline.   Psychiatric:         Mood and Affect: Mood normal.           Diagnoses and health risks identified today and associated recommendations/orders:    1. Encounter for Medicare annual wellness exam  Exam and Assessments performed  Chart Review Complete  Health Maintenance Reviewed and updated  - Ambulatory Referral/Consult to Enhanced Annual Wellness Visit (eAWV)    2. Hypertension, unspecified type  Elevated today  No meds this " AM  Followed by outside Cardiology    3. Mixed hyperlipidemia  Stable on statin therapy  Followed by outside Cardiology    4. History of renal cell cancer  S/p partial nephrectomy in 2016  Followed by Urology    5. Invasive lobular carcinoma of left breast in female  6. Secondary and unspecified malignant neoplasm of axilla and upper limb lymph nodes  Dx 2022  S/p lumpectomy  Not certain if taking tamoxifen reviewed and discussed importance of adherence  Followed by Oncology    7. Cognitive impairment  Mini cog score = 1  Outside claims for Alzheimer's   Pt has appt with outside radiologist    8. Aortic atherosclerosis  Noted on prior imaging  Stable  Followed by outside Cardiology      Provided Lola with a 5-10 year written screening schedule and personal prevention plan. Recommendations were developed using the USPSTF age appropriate recommendations. Education, counseling, and referrals were provided as needed.  After Visit Summary printed and given to patient which includes a list of additional screenings\tests needed.    Follow up SCHEDULE APPT WITH YOUR PCP DR. TROY.      Billie Henson PA-C    Future Appointments   Date Time Provider Department Center   7/29/2024 10:30 AM LAB, HEMONC CANCER BLDG [facility] LAB HO Kayden Chavez   7/29/2024 11:30 AM Janel Oconnell MD [facility] HEMONC3 Monique Canheath   8/19/2024  9:00 AM Ryann Bell MD [facility] URO [facility]

## 2024-06-25 NOTE — PATIENT INSTRUCTIONS
Counseling and Referral of Other Preventative  (Italic type indicates deductible and co-insurance are waived)    Patient Name: Lola Lerner  Today's Date: 6/25/2024    Health Maintenance       Date Due Completion Date    TETANUS VACCINE Never done ---    RSV Vaccine (Age 60+ and Pregnant patients) (1 - 1-dose 60+ series) Never done ---    Shingles Vaccine (1 of 2) 03/19/2013 1/22/2013    COVID-19 Vaccine (5 - 2023-24 season) 09/01/2023 12/8/2022    DEXA Scan 09/14/2024 9/14/2022    Override on 6/15/1995: Done    Influenza Vaccine (Season Ended) 09/01/2024 9/29/2020    Lipid Panel 09/07/2027 9/7/2022    Override on 4/2/2015: Done        No orders of the defined types were placed in this encounter.    The following information is provided to all patients.  This information is to help you find resources for any of the problems found today that may be affecting your health:                  Living healthy guide: www.[facility].[place].gov      Understanding Diabetes: www.diabetes.org      Eating healthy: www.cdc.gov/healthyweight      CDC home safety checklist: www.cdc.gov/steadi/patient.html      Agency on Aging: www.goea.[place].gov      Alcoholics anonymous (AA): www.aa.org      Physical Activity: www.jazzmine.nih.gov/aq3nbva      Tobacco use: www.quitwithusla.org

## 2024-07-26 ENCOUNTER — TELEPHONE (OUTPATIENT)
Dept: HEMATOLOGY/ONCOLOGY | Facility: CLINIC | Age: 84
End: 2024-07-26
Payer: MEDICARE

## 2024-07-29 ENCOUNTER — OFFICE VISIT (OUTPATIENT)
Dept: HEMATOLOGY/ONCOLOGY | Facility: CLINIC | Age: 84
End: 2024-07-29
Payer: MEDICARE

## 2024-07-29 ENCOUNTER — LAB VISIT (OUTPATIENT)
Dept: LAB | Facility: HOSPITAL | Age: 84
End: 2024-07-29
Attending: INTERNAL MEDICINE
Payer: MEDICARE

## 2024-07-29 VITALS
WEIGHT: 149.69 LBS | TEMPERATURE: 97 F | SYSTOLIC BLOOD PRESSURE: 173 MMHG | DIASTOLIC BLOOD PRESSURE: 76 MMHG | HEIGHT: 67 IN | OXYGEN SATURATION: 99 % | HEART RATE: 62 BPM | BODY MASS INDEX: 23.49 KG/M2 | RESPIRATION RATE: 17 BRPM

## 2024-07-29 DIAGNOSIS — Z85.528 HISTORY OF RENAL CELL CANCER: ICD-10-CM

## 2024-07-29 DIAGNOSIS — C77.3 SECONDARY AND UNSPECIFIED MALIGNANT NEOPLASM OF AXILLA AND UPPER LIMB LYMPH NODES: ICD-10-CM

## 2024-07-29 DIAGNOSIS — C50.912 INVASIVE LOBULAR CARCINOMA OF LEFT BREAST IN FEMALE: ICD-10-CM

## 2024-07-29 DIAGNOSIS — C50.912 INVASIVE LOBULAR CARCINOMA OF LEFT BREAST IN FEMALE: Primary | ICD-10-CM

## 2024-07-29 DIAGNOSIS — F02.80 ALZHEIMER'S DEMENTIA WITHOUT BEHAVIORAL DISTURBANCE, PSYCHOTIC DISTURBANCE, MOOD DISTURBANCE, OR ANXIETY, UNSPECIFIED DEMENTIA SEVERITY, UNSPECIFIED TIMING OF DEMENTIA ONSET: ICD-10-CM

## 2024-07-29 DIAGNOSIS — G30.9 ALZHEIMER'S DEMENTIA WITHOUT BEHAVIORAL DISTURBANCE, PSYCHOTIC DISTURBANCE, MOOD DISTURBANCE, OR ANXIETY, UNSPECIFIED DEMENTIA SEVERITY, UNSPECIFIED TIMING OF DEMENTIA ONSET: ICD-10-CM

## 2024-07-29 LAB
ALBUMIN SERPL BCP-MCNC: 3.7 G/DL (ref 3.5–5.2)
ALP SERPL-CCNC: 80 U/L (ref 55–135)
ALT SERPL W/O P-5'-P-CCNC: 13 U/L (ref 10–44)
ANION GAP SERPL CALC-SCNC: 6 MMOL/L (ref 8–16)
AST SERPL-CCNC: 17 U/L (ref 10–40)
BASOPHILS # BLD AUTO: 0.03 K/UL (ref 0–0.2)
BASOPHILS NFR BLD: 0.5 % (ref 0–1.9)
BILIRUB SERPL-MCNC: 0.8 MG/DL (ref 0.1–1)
BUN SERPL-MCNC: 13 MG/DL (ref 8–23)
CALCIUM SERPL-MCNC: 9.6 MG/DL (ref 8.7–10.5)
CHLORIDE SERPL-SCNC: 107 MMOL/L (ref 95–110)
CO2 SERPL-SCNC: 25 MMOL/L (ref 23–29)
CREAT SERPL-MCNC: 0.7 MG/DL (ref 0.5–1.4)
DIFFERENTIAL METHOD BLD: ABNORMAL
EOSINOPHIL # BLD AUTO: 0.1 K/UL (ref 0–0.5)
EOSINOPHIL NFR BLD: 0.9 % (ref 0–8)
ERYTHROCYTE [DISTWIDTH] IN BLOOD BY AUTOMATED COUNT: 13.1 % (ref 11.5–14.5)
EST. GFR  (NO RACE VARIABLE): >60 ML/MIN/1.73 M^2
GLUCOSE SERPL-MCNC: 94 MG/DL (ref 70–110)
HCT VFR BLD AUTO: 42.2 % (ref 37–48.5)
HGB BLD-MCNC: 13.9 G/DL (ref 12–16)
IMM GRANULOCYTES # BLD AUTO: 0.01 K/UL (ref 0–0.04)
IMM GRANULOCYTES NFR BLD AUTO: 0.2 % (ref 0–0.5)
LYMPHOCYTES # BLD AUTO: 1.3 K/UL (ref 1–4.8)
LYMPHOCYTES NFR BLD: 21.6 % (ref 18–48)
MCH RBC QN AUTO: 31.1 PG (ref 27–31)
MCHC RBC AUTO-ENTMCNC: 32.9 G/DL (ref 32–36)
MCV RBC AUTO: 94 FL (ref 82–98)
MONOCYTES # BLD AUTO: 0.5 K/UL (ref 0.3–1)
MONOCYTES NFR BLD: 8.7 % (ref 4–15)
NEUTROPHILS # BLD AUTO: 4 K/UL (ref 1.8–7.7)
NEUTROPHILS NFR BLD: 68.1 % (ref 38–73)
NRBC BLD-RTO: 0 /100 WBC
PLATELET # BLD AUTO: 216 K/UL (ref 150–450)
PMV BLD AUTO: 10.2 FL (ref 9.2–12.9)
POTASSIUM SERPL-SCNC: 4.9 MMOL/L (ref 3.5–5.1)
PROT SERPL-MCNC: 7.2 G/DL (ref 6–8.4)
RBC # BLD AUTO: 4.47 M/UL (ref 4–5.4)
SODIUM SERPL-SCNC: 138 MMOL/L (ref 136–145)
WBC # BLD AUTO: 5.84 K/UL (ref 3.9–12.7)

## 2024-07-29 PROCEDURE — 85025 COMPLETE CBC W/AUTO DIFF WBC: CPT | Performed by: INTERNAL MEDICINE

## 2024-07-29 PROCEDURE — 36415 COLL VENOUS BLD VENIPUNCTURE: CPT | Performed by: INTERNAL MEDICINE

## 2024-07-29 PROCEDURE — 99213 OFFICE O/P EST LOW 20 MIN: CPT | Mod: PBBFAC | Performed by: INTERNAL MEDICINE

## 2024-07-29 PROCEDURE — 80053 COMPREHEN METABOLIC PANEL: CPT | Performed by: INTERNAL MEDICINE

## 2024-07-29 PROCEDURE — 99999 PR PBB SHADOW E&M-EST. PATIENT-LVL III: CPT | Mod: PBBFAC,,, | Performed by: INTERNAL MEDICINE

## 2024-07-29 NOTE — PROGRESS NOTES
Subjective     Patient ID: Lola Lerner is a 84 y.o. female.    Chief Complaint: Invasive lobular carcinoma of left breast in female    HPI    Follows up on Tamoxifen - although has been held   reports he told internist recently she was taken off and wanted to cross check this as not currently taking and could not remember if discussed  At this time, her memory is worsening and it is ok to remain off especially if concerned about compliance and if impacting her memory   plans to get a memory work up as well    Presents for follow up of invasive lobular breast cancer diagnosis after completion of surgery  pT3 pN1mi      Oncology History:  - self detected a lump in her left breast about one month prior. She has not noted any pain, skin changes, or nipple discharge. She did report that her left nipple had changed somewhat (may be more inverted now).     - she was up to date with screening mammograms up until 2018. She required a biopsy once for a suspicious finding on mammogram but this was ultimately benign.      - 9/14/2022 Diagnostic Mammogram:  Findings:  This procedure was performed using tomosynthesis. Computer-aided detection was utilized in the interpretation of this examination.  The breasts have scattered areas of fibroglandular density.   Mammo Digital Diagnostic Bilat with Jovanny  Left  Developing Asymmetry: There is a developing asymmetry seen in the upper inner quadrant of the left breast. The asymmetry correlates with the palpable mass and skin changes reported by the patient.   Right  There is no evidence of suspicious masses, calcifications, or other abnormal findings in the right breast.  US Breast Left Limited  Left  Mass: There is a 26 mm x 33 mm x 29 mm irregularly shaped, non-parallel, hypoechoic mass with indistinct margins with shadowing seen in the upper inner quadrant of the left breast at 10 o'clock, 2 cm from the nipple. The mass correlates with the palpable mass reported  by the patient.    Targeted ultrasound of the left axilla is normal.  Impression:  Left  Mass: Left breast 26 mm x 33 mm x 29 mm mass at the upper inner 10 o'clock position. Assessment: 4A - Suspicious finding. Biopsy is recommended.   Right  There is no mammographic or sonographic evidence of malignancy in the right breast.  BI-RADS Category:   Overall: 4A - Low Suspicion for Malignancy  Recommendation:  Biopsy is recommended. I discussed these findings and recommendations with the patient and her  in detail at the time of exam.     - 9/20/2022 Breast Biopsy:  Breast, left, ultrasound-guided core needle biopsies of mass:   - Invasive lobular carcinoma       - Present in all sampled cores (6 mm in greatest contiguous dimension)       - Aparna-Pal modified SBR score 3 + 2 + 1 = 6 of 9       - Histologic grade 2 of 3       - Mitotic index = 0.1 (average mitoses per high power field) (low)   - Biomarkers, assessed by immunohistochemistry on block 1A       - Estrogen Receptor (ER):  Positive (90%; strong intensity)       - Progesterone Receptor (MN):  Positive (70%; strong intensity)       - HER2:  Equivocal (2+)       - HER2 FISH pending; results to follow in a supplemental report       - Ki-67 proliferation index:  Averages 5-10%   - Lymphovascular invasion is not identified      - 9/29/2022 Unable to tolerate Breast MRI     - 10/19/2022 Lumpectomy and [address] LN assessment with Dr. Spencer  Pathology:  1. LEFT BREAST TO INCLUDE A SEGMENT OF SKIN WITHOUT NIPPLE, PARTIAL   MASTECTOMY:   -  Extensive invasive pleomorphic lobular carcinoma, Huyen histologic   grade 2, with focal lymphovascular invasion and focal microcalcifications;   tumor size = 72 mm.  The invasive carcinoma directly extends into the   anterior, inferior, superior, medial, and lateral margins.  The invasive   carcinoma is 1.9 mm from the posterior margin.  Please note that the anterior   margin is the only final margin on this specimen.   The skin is free of tumor.   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   -  Focal atypical ductal hyperplasia.   -  Focal flat epithelial atypia.   -  Multifocal complex sclerosing lesions with focal florid usual ductal   hyperplasia and focal intraductal micropapillomas.   -  Small fibroadenoma, tumor size = 4 mm.   -  Fibrocystic changes with columnar cell change, columnar cell hyperplasia,   adenosis with blunt duct adenosis and sclerosing adenosis, usual ductal   hyperplasia, apocrine metaplasia, microcysts, stromal fibrosis, and   microcalcifications.   -  Focal duct ectasia.   -  Moderate-to-marked arteriosclerosis.   -  Organizing biopsy site with fat necrosis, fibrosis and chronic   inflammation.   2. LEFT BREAST, ADDITIONAL SUPERIOR MARGIN, EXCISION:   -  No evidence of malignancy; there is no invasive lobular carcinoma or   lobular carcinoma in situ.   -  No breast ducts or lobules are present for evaluation.   3. LEFT BREAST, ADDITIONAL INFERIOR MARGIN, EXCISION:   -  Invasive pleomorphic lobular carcinoma, Huyen histologic grade 2,   with multifocal lymphovascular invasion.  The invasive carcinoma focally   extends into the new inferior margin.   -  Focal lobular carcinoma in situ with duct extension.   -  Focal complex sclerosing lesion.   -  Fibrocystic changes with columnar cell change, columnar cell hyperplasia,   usual ductal hyperplasia, adenosis with blunt duct adenosis, apocrine   metaplasia, microcysts, stromal fibrosis and focal microcalcifications.   -  Focal duct ectasia.   4. LEFT BREAST, ADDITIONAL MEDIAL MARGIN, EXCISION:   -  Invasive pleomorphic lobular carcinoma, Huyen histologic grade 2,   with focal lymphovascular invasion.  The new medial margin is free of tumor   and the invasive carcinoma is 0.9 mm from the new medial margin.   -  Focal lobular carcinoma in situ.   -   Fibrocystic changes with columnar cell change, usual ductal hyperplasia,   and stromal fibrosis.   5. LEFT BREAST, ADDITIONAL LATERAL MARGIN, EXCISION:   -  No evidence of malignancy; there is no residual invasive lobular carcinoma   or lobular carcinoma in situ.   -  Focal mild fibrocystic changes with columnar cell change, adenosis, and   stromal fibrosis.   6. LEFT BREAST, ADDITIONAL POSTERIOR MARGIN, EXCISION:   -  No evidence of malignancy; there is no residual invasive lobular carcinoma   or lobular carcinoma in situ.   -  No breast ducts or lobules are present for evaluation.   7. LEFT AXILLARY SENTINEL LYMPH NODES (5), EXCISIONAL BIOPSY:   -  1 out of 5 lymph nodes demonstrates micrometastatic pleomorphic lobular   carcinoma; size of metastatic deposit = 1.4 mm.  There is no evidence of   extranodal extension.   -  Mild reactive lymphoid hyperplasia, mixed pattern.   -  Mild lipomatosis.   INVASIVE CARCINOMA OF THE BREAST, RESECTION, CANCER CASE SUMMARY:   PROCEDURE:  Partial mastectomy/lumpectomy with segment of skin without nipple.   SPECIMEN LATERALITY:  Left.   TUMOR SITE:  Not specified.   TUMOR, HISTOLOGIC TYPE:  Invasive pleomorphic lobular carcinoma.   TUMOR, HISTOLOGIC GRADE (ANTONIO HISTOLOGIC SCORE):        Glandular (acinar)/tubular differentiation:  Score 3.        Nuclear pleomorphism:  Score 2.        Mitotic rate:  Score 1.        Overall grade:  Grade 2 (score 6).   TUMOR SIZE:  72 mm.   TUMOR FOCALITY:  Can not be determined.   DUCTAL CARCINOMA IN SITU (DCIS):  Not identified.   TUMOR EXTENT:        Skin:  Skin is present and uninvolved.        Nipple and skeletal muscle:  Not applicable (nipple and skeletal muscle   are absent).   LYMPHOVASCULAR INVASION:  Present.   DERMAL LYMPHOVASCULAR INVASION:  Not identified.   MICROCALCIFICATIONS:  Present in invasive carcinoma, LCIS, and non-neoplastic   tissue.   TREATMENT EFFECT IN THE BREAST:  No known presurgical therapy.   MARGIN STATUS FOR  INVASIVE CARCINOMA:        MARGINS INVOLVED BY INVASIVE CARCINOMA:  The anterior and inferior   margins are involved by invasive carcinoma.             Extent of involvement of anterior margin:  4 mm.             Extent of involvement of inferior margin:  Less than 1 mm.        MARGINS UNINVOLVED BY INVASIVE CARCINOMA:  The medial, superior,   posterior and lateral margins are uninvolved by invasive carcinoma.             Distance from invasive carcinoma to medial margin:  0.9 mm.             Distance from invasive carcinoma to superior margin:  8 mm.             Distance from invasive carcinoma to posterior margin:  9.9 mm.             Distance from invasive carcinoma to lateral margin:  13 mm.   REGIONAL LYMPH NODE STATUS:  Regional lymph nodes present; tumor present in   regional lymph node.        Number of lymph nodes with macrometastasis:  0.        Number of lymph nodes with micrometastasis:  1.        Number of lymph nodes with isolated tumor cells:  0.        Size of largest willy metastatic deposit:  1.4 mm.        Extranodal extension:  Not identified.        Total number of lymph nodes examined (sentinel and nonsentinel):  5.        Number of sentinel nodes examined:  5.   DISTANT METASTASIS:  Not applicable.   PATHOLOGIC STAGE CLASSIFICATION (pTNM, AJCC 8TH EDITION):   pT3:  Tumor size = 72 mm (greater than 50 mm in greatest dimension).   pN1mi (sn):  Micrometastasis in 1 axillary sentinel lymph node.   pM: Not applicable.   ADDITIONAL FINDINGS:  Please see above final diagnoses on specimens 1-7.   BREAST BIOMARKER TESTING PERFORMED ON PREVIOUS BIOPSY, CASE [id number]   (interpreted by Dr. Esteban Grace):  Estrogen receptor (ER):  Positive   (90%; strong intensity)   Progesterone receptor (PGR):  Positive (70%; strong intensity).   HER2 by IHC:  Equivocal (2+).   HER2 by FISH:  Negative (performed at [facility]).   Ki-67 proliferation index:  Averages 5-10%.      No Oncotype sent and opted to hold  off at age and with tumor characteristics as was not intending to do chemotherapy     GynHx:   Menarche around age 14 but unsure  Menopause in early 50s, unsure  A0  Very minimal breastfeeding     FH:  No personal history of breast cancer, no family history of breast cancer. No family history of prostate, endometrial, or ovarian cancer.      SH:    Never smoker, non drinker     PMH:          Active Ambulatory Problems     Diagnosis Date Noted    Hypertension 2013    Hyperlipidemia 2013    Recurrent UTI 2016    OAB (overactive bladder) 2016    Malignant neoplasm of right kidney 2016    Invasive lobular carcinoma of breast, stage 2, left 10/03/2022              Resolved Ambulatory Problems     Diagnosis Date Noted    No Resolved Ambulatory Problems              Past Medical History:   Diagnosis Date    Urge incontinence      Vaginal delivery          Review of Systems   Constitutional:  Negative for activity change, appetite change, chills, fatigue, fever and unexpected weight change.   HENT:  Negative for nasal congestion, mouth sores, postnasal drip, rhinorrhea, sinus pressure/congestion and trouble swallowing.    Eyes:  Negative for visual disturbance.   Respiratory:  Negative for cough and shortness of breath.    Cardiovascular:  Negative for chest pain, palpitations and leg swelling.   Gastrointestinal:  Negative for abdominal pain, diarrhea, nausea, vomiting and reflux.   Genitourinary:  Positive for frequency. Negative for bladder incontinence, decreased urine volume, difficulty urinating, dysuria and urgency.   Musculoskeletal:  Negative for arthralgias, back pain, gait problem, joint swelling and joint deformity.   Integumentary:  Negative for rash.   Neurological:  Negative for dizziness, weakness, light-headedness, numbness and headaches.   Hematological:  Negative for adenopathy. Does not bruise/bleed easily.   Psychiatric/Behavioral:  Negative for  dysphoric mood and sleep disturbance. The patient is not nervous/anxious.           Objective     Physical Exam  Vitals and nursing note reviewed.   Constitutional:       General: She is not in acute distress.     Appearance: Normal appearance. She is well-developed and normal weight. She is not ill-appearing.      Comments: Presents with her   ECOG= 0  Very pleasant   HENT:      Head: Normocephalic and atraumatic.   Eyes:      General: No scleral icterus.        Right eye: No discharge.         Left eye: No discharge.      Extraocular Movements: Extraocular movements intact.      Conjunctiva/sclera: Conjunctivae normal.      Pupils: Pupils are equal, round, and reactive to light.      Right eye: Pupil is round and reactive.      Left eye: Pupil is round and reactive.   Neck:      Thyroid: No thyromegaly.      Vascular: No JVD.      Trachea: No tracheal deviation.   Cardiovascular:      Rate and Rhythm: Normal rate and regular rhythm.      Heart sounds: Normal heart sounds. No murmur heard.     No friction rub. No gallop.   Pulmonary:      Effort: Pulmonary effort is normal. No respiratory distress.      Breath sounds: Normal breath sounds. No wheezing, rhonchi or rales.      Comments: Left breast has an area of mass not felt prior  Abdominal:      General: Abdomen is flat. Bowel sounds are normal. There is no distension.      Palpations: Abdomen is soft. There is no mass.      Tenderness: There is no abdominal tenderness. There is no guarding or rebound.      Comments: No organomegaly   Musculoskeletal:         General: No swelling, tenderness or deformity. Normal range of motion.      Cervical back: Normal range of motion and neck supple.      Right lower leg: No edema.      Left lower leg: No edema.   Lymphadenopathy:      Head:      Right side of head: No submandibular adenopathy.      Left side of head: No submandibular adenopathy.      Cervical: No cervical adenopathy.      Right cervical: No  superficial, deep or posterior cervical adenopathy.     Left cervical: No superficial, deep or posterior cervical adenopathy.      Upper Body:      Right upper body: No supraclavicular adenopathy.      Left upper body: No supraclavicular adenopathy.   Skin:     General: Skin is warm and dry.      Coloration: Skin is not jaundiced.      Findings: No bruising, lesion, petechiae or rash.   Neurological:      General: No focal deficit present.      Mental Status: She is alert and oriented to person, place, and time.      Sensory: No sensory deficit.      Motor: No weakness.      Coordination: Coordination normal.      Gait: Gait normal.      Deep Tendon Reflexes: Reflexes are normal and symmetric.   Psychiatric:         Mood and Affect: Mood normal. Mood is not anxious or depressed.         Behavior: Behavior normal.         Thought Content: Thought content normal.         Judgment: Judgment normal.          Assessment and Plan     1. 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 History of renal cell cancer      Stop breast cancer therapy in light of dementia and monitor  However. Assess area felt on exam as patient cannot recall if there prior  Knows to call for any issues    Route Chart for Scheduling    Med Onc Chart Routing  Urgent    Follow up with physician . Left breast ultrasound   Follow up with FABIOLA 3 months.   Infusion scheduling note    Injection scheduling note    Labs    Imaging    Pharmacy appointment    Other referrals

## 2024-07-31 PROBLEM — F02.80 ALZHEIMER'S DEMENTIA: Status: ACTIVE | Noted: 2024-07-31

## 2024-07-31 PROBLEM — G30.9 ALZHEIMER'S DEMENTIA: Status: ACTIVE | Noted: 2024-07-31

## 2024-08-14 ENCOUNTER — TELEPHONE (OUTPATIENT)
Dept: HEMATOLOGY/ONCOLOGY | Facility: CLINIC | Age: 84
End: 2024-08-14
Payer: MEDICARE

## 2024-08-14 DIAGNOSIS — C50.912 INVASIVE LOBULAR CARCINOMA OF LEFT BREAST IN FEMALE: Primary | ICD-10-CM

## 2024-08-14 NOTE — TELEPHONE ENCOUNTER
----- Message from Emy Ruiz RN sent at 8/14/2024  9:54 AM CDT -----  done  ----- Message -----  From: Stella Prescott  Sent: 8/14/2024   9:49 AM CDT  To: Anish BONILLA Staff    Can you order a left breast ultrasound please? It is from the visit on 7/29. Thank you

## 2024-08-19 ENCOUNTER — OFFICE VISIT (OUTPATIENT)
Dept: UROLOGY | Facility: CLINIC | Age: 84
End: 2024-08-19
Payer: MEDICARE

## 2024-08-19 VITALS — BODY MASS INDEX: 23.51 KG/M2 | WEIGHT: 150.13 LBS

## 2024-08-19 DIAGNOSIS — N39.0 RECURRENT UTI: ICD-10-CM

## 2024-08-19 DIAGNOSIS — R35.1 NOCTURIA: ICD-10-CM

## 2024-08-19 DIAGNOSIS — N32.81 OAB (OVERACTIVE BLADDER): Primary | ICD-10-CM

## 2024-08-19 DIAGNOSIS — C64.1 MALIGNANT NEOPLASM OF RIGHT KIDNEY: ICD-10-CM

## 2024-08-19 PROCEDURE — 99999 PR PBB SHADOW E&M-EST. PATIENT-LVL III: CPT | Mod: PBBFAC,,, | Performed by: UROLOGY

## 2024-08-19 PROCEDURE — 99213 OFFICE O/P EST LOW 20 MIN: CPT | Mod: PBBFAC | Performed by: UROLOGY

## 2024-08-19 PROCEDURE — 99214 OFFICE O/P EST MOD 30 MIN: CPT | Mod: S$PBB,,, | Performed by: UROLOGY

## 2024-08-19 NOTE — PROGRESS NOTES
Subjective:       Lola Lerner is a 84 y.o. female who is an established patient who was self-referred for evaluation of OAB/UTIs and RCC.      She reports recurrent UTIs. About 3 times a year. She has classic UTI symptoms - dysuria, frequency. Denies fevers. Denies constipation. No h/o stones.      She also has issues with OAB. Reports urgency and frequency. Nocturia x 2. UUI common usually due to waiting to void. Denies SAMSON. She was on Vesicare 10mg, initially helpful. Changed to Sanctura, then Myrbetriq, then Oxytrol. Restarted Myrbetriq 25 in  with modest improvement.    PVR (bladder scan) - 0cc, 86cc, 56cc, 107cc    VICTORINA was done for UTI workup and noted a 3.5cm solid R renal mass. PVR 66cc. No family history of  malignancy. No gross hematuria.      CT scan shows an enhancing 4.6cm R UP renal mass. CXR showed abnormal area in R lung. CT chest showed likely inflammatory scarring, doubt metastasis.    She is now s/p R open partial nephrectomy on 16. She did well after surgery.  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 Used walker, now improved. Denies LUTS - no dysuria or other UTI symptoms. She does not currently have PCP (recently ).     2023  Recent breast cancer diagnosis. Returns with frequency. Denies dysuria. +UA. On Myrbetriq 50mg.   PVR (bladder scan) today - 16cc    2023  Less urgency. Remains on Myrbetriq 50mg. Treated for UTI in  - E coli. Nocturia x 1.     2024  Recent VICTORINA normal. No recent UTIs. Remains on Myrbetriq 50mg. +UUI, mainly at night. Doing Kegels x 2 weeks, no change.     2024  Given trial Gemtesa. Symptoms improved significantly. No recent UTIs. Still with nocturia but less bothersome.       CT c/a/p  - stable 1.6cm area in RUL lung (likely inflammatory), likely post-op changes in R kidney.   CT c/a/p  - post-op changes R  kidney, RUL lung area resolved, now with 2mm nodule in RML lung. CT c/a/p 11/17 - stable R kidney, micronodules in R lung  CT c/a/p 11/18 - R renal scar, stable micronodules in lung  CT a/p 11/19 - R renal scar, no mets/recurrence, stable micronodules in lung, CXR - wnl  CT a/p 11/20 - post-op R kidney, no mets/recurrence, simple L renal cysts. Stable micronodule in RLL lung. CXR - wnl  CT a/p 11/21 - no mets/recurrence. CXR - wnl  VICTORINA 2/24 - no new renal mass/stone. R partial.          The following portions of the patient's history were reviewed and updated as appropriate: allergies, current medications, past family history, past medical history, past social history, past surgical history and problem list.    Review of Systems  Constitutional: no fever or chills  ENT: no nasal congestion or sore throat  Respiratory: no cough or shortness of breath  Cardiovascular: no chest pain or palpitations  Gastrointestinal: no nausea or vomiting, tolerating diet  Genitourinary: as per HPI  Hematologic/Lymphatic: no easy bruising or lymphadenopathy  Musculoskeletal: no arthralgias or myalgias  Skin: no rashes or lesions  Neurological: no seizures or tremors  Behavioral/Psych: no auditory or visual hallucinations       Objective:    Vitals:   Wt 68.1 kg (150 lb 2.1 oz)   BMI 23.51 kg/m²     Physical Exam   General: well developed, well nourished in no acute distress  Head: normocephalic, atraumatic  Neck: supple, trachea midline, no obvious enlargement of thyroid  HEENT: EOMI, mucus membranes moist, sclera anicteric, no hearing impairment  Lungs: symmetric expansion, non-labored breathing  Skin: no rashes or lesions  Neuro: alert and oriented x 3, no gross deficits  Psych: normal judgment and insight, normal mood/affect and non-anxious  Genitourinary: deferred    Inc R flank - well healed      Lab Review    Urine analysis today in clinic shows - no urine     Lab Results   Component Value Date    WBC 5.84 07/29/2024    HGB 13.9  07/29/2024    HCT 42.2 07/29/2024    MCV 94 07/29/2024     07/29/2024     Lab Results   Component Value Date    CREATININE 0.7 07/29/2024    BUN 13 07/29/2024         Imaging  Images and reports were personally reviewed by me and discussed with patient  VICTORINA reviewed, CT reviewed       Assessment/Plan:      1. Recurrent UTI    - VICTORINA with PVR - R renal mass, mildly elevated PVR 66cc   - Recommended Estrace, she declined this.    - Improving UUI will also likely help improve UTIs     2. OAB (overactive bladder)    - Worsened previously 2/2 UTI - treated   - Myrbetriq - stopped 2/2 SE (dizziness)   - Sanctura - seems to help but severe dry mouth   - Oxytrol patch (OTC) - some improvement, using though some difficulty obtaining   - Bladder retraining/pelvic floor exercises handout given previously - handout given again   - Trial Gemtesa - doing better. Stopped Myrbetriq.      3. 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 Okay to stop screening - okay to cont VICTORINA q2y    - VICTORINA in 2 yr (2/26)    4. Leg pain   - Do not suspect  related    - Improved   - No urinary symptoms    - Recent imaging of kidney (11/21) normal     5. Nocturia   - x 1   - Reduce PM fluids          Follow up in 12 months

## 2024-08-21 ENCOUNTER — HOSPITAL ENCOUNTER (OUTPATIENT)
Dept: RADIOLOGY | Facility: HOSPITAL | Age: 84
Discharge: HOME | End: 2024-08-21
Attending: INTERNAL MEDICINE
Payer: MEDICARE

## 2024-08-21 DIAGNOSIS — C50.612 MALIGNANT NEOPLASM OF AXILLARY TAIL OF LEFT FEMALE BREAST: ICD-10-CM

## 2024-08-21 DIAGNOSIS — N63.0 LUMP, BREAST: ICD-10-CM

## 2024-08-21 DIAGNOSIS — C50.912 INVASIVE LOBULAR CARCINOMA OF LEFT BREAST IN FEMALE: ICD-10-CM

## 2024-08-21 PROCEDURE — 77066 DX MAMMO INCL CAD BI: CPT | Mod: TC

## 2024-08-21 PROCEDURE — 77062 BREAST TOMOSYNTHESIS BI: CPT | Mod: TC

## 2024-08-21 PROCEDURE — 76642 ULTRASOUND BREAST LIMITED: CPT | Mod: TC,LT

## 2024-08-22 DIAGNOSIS — R92.2 INCONCLUSIVE MAMMOGRAM: ICD-10-CM

## 2024-08-22 DIAGNOSIS — C50.912 INVASIVE LOBULAR CARCINOMA OF LEFT BREAST IN FEMALE: Primary | ICD-10-CM

## 2024-08-29 ENCOUNTER — TELEPHONE (OUTPATIENT)
Dept: RADIOLOGY | Facility: HOSPITAL | Age: 84
End: 2024-08-29
Payer: MEDICARE

## 2024-08-29 NOTE — TELEPHONE ENCOUNTER
----- Message from Ginny Alcantar RN sent at 8/29/2024  7:14 AM CDT -----  Per Dr. Oconnell:  Needs 3 month mammogram   Order in   Can we make sure scheduled?  would appreciate a call is well       Pt is being seen with [facility]J on 10/21/24 at 10am. Not sure if they would want to come same day before or a day or so before but whatever works for them.       Thank you :)   Ginny CROWE

## 2024-09-04 ENCOUNTER — TELEPHONE (OUTPATIENT)
Dept: RADIOLOGY | Facility: HOSPITAL | Age: 84
End: 2024-09-04
Payer: MEDICARE

## 2024-10-21 ENCOUNTER — OFFICE VISIT (OUTPATIENT)
Dept: HEMATOLOGY/ONCOLOGY | Facility: CLINIC | Age: 84
End: 2024-10-21
Payer: MEDICARE

## 2024-10-21 VITALS
OXYGEN SATURATION: 98 % | HEART RATE: 61 BPM | TEMPERATURE: 97 F | BODY MASS INDEX: 23.2 KG/M2 | HEIGHT: 67 IN | SYSTOLIC BLOOD PRESSURE: 191 MMHG | RESPIRATION RATE: 18 BRPM | DIASTOLIC BLOOD PRESSURE: 76 MMHG | WEIGHT: 147.81 LBS

## 2024-10-21 DIAGNOSIS — Z85.528 HISTORY OF RENAL CELL CANCER: ICD-10-CM

## 2024-10-21 DIAGNOSIS — C50.912 INVASIVE LOBULAR CARCINOMA OF LEFT BREAST IN FEMALE: Primary | ICD-10-CM

## 2024-10-21 DIAGNOSIS — G30.9 ALZHEIMER'S DEMENTIA WITHOUT BEHAVIORAL DISTURBANCE, PSYCHOTIC DISTURBANCE, MOOD DISTURBANCE, OR ANXIETY, UNSPECIFIED DEMENTIA SEVERITY, UNSPECIFIED TIMING OF DEMENTIA ONSET: ICD-10-CM

## 2024-10-21 DIAGNOSIS — C77.3 SECONDARY AND UNSPECIFIED MALIGNANT NEOPLASM OF AXILLA AND UPPER LIMB LYMPH NODES: ICD-10-CM

## 2024-10-21 DIAGNOSIS — F02.80 ALZHEIMER'S DEMENTIA WITHOUT BEHAVIORAL DISTURBANCE, PSYCHOTIC DISTURBANCE, MOOD DISTURBANCE, OR ANXIETY, UNSPECIFIED DEMENTIA SEVERITY, UNSPECIFIED TIMING OF DEMENTIA ONSET: ICD-10-CM

## 2024-10-21 DIAGNOSIS — I10 PRIMARY HYPERTENSION: ICD-10-CM

## 2024-10-21 PROCEDURE — 99999 PR PBB SHADOW E&M-EST. PATIENT-LVL III: CPT | Mod: PBBFAC,,, | Performed by: NURSE PRACTITIONER

## 2024-10-21 PROCEDURE — 99213 OFFICE O/P EST LOW 20 MIN: CPT | Mod: PBBFAC | Performed by: NURSE PRACTITIONER

## 2024-10-21 NOTE — PROGRESS NOTES
Subjective     Patient ID: Lola Lerner is a 84 y.o. female.    Chief Complaint: Invasive lobular carcinoma of left breast in female    HPI    Follows up on Tamoxifen - although has been held  Overall feels good  No breast concerns or issues.   Appetite good   No trips or falls.   Memory issues.   Cataract surgery - did well with first one. This Wednesday will get her second eye.     Presents for follow up of invasive lobular breast cancer diagnosis after completion of surgery  pT3 pN1mi      Oncology History:  - self detected a lump in her left breast about one month prior. She has not noted any pain, skin changes, or nipple discharge. She did report that her left nipple had changed somewhat (may be more inverted now).     - she was up to date with screening mammograms up until 2018. She required a biopsy once for a suspicious finding on mammogram but this was ultimately benign.      - 9/14/2022 Diagnostic Mammogram:  Findings:  This procedure was performed using tomosynthesis. Computer-aided detection was utilized in the interpretation of this examination.  The breasts have scattered areas of fibroglandular density.   Mammo Digital Diagnostic Bilat with Jovanny  Left  Developing Asymmetry: There is a developing asymmetry seen in the upper inner quadrant of the left breast. The asymmetry correlates with the palpable mass and skin changes reported by the patient.   Right  There is no evidence of suspicious masses, calcifications, or other abnormal findings in the right breast.  US Breast Left Limited  Left  Mass: There is a 26 mm x 33 mm x 29 mm irregularly shaped, non-parallel, hypoechoic mass with indistinct margins with shadowing seen in the upper inner quadrant of the left breast at 10 o'clock, 2 cm from the nipple. The mass correlates with the palpable mass reported by the patient.    Targeted ultrasound of the left axilla is normal.  Impression:  Left  Mass: Left breast 26 mm x 33 mm x 29 mm mass at the  upper inner 10 o'clock position. Assessment: 4A - Suspicious finding. Biopsy is recommended.   Right  There is no mammographic or sonographic evidence of malignancy in the right breast.  BI-RADS Category:   Overall: 4A - Low Suspicion for Malignancy  Recommendation:  Biopsy is recommended. I discussed these findings and recommendations with the patient and her  in detail at the time of exam.     - 9/20/2022 Breast Biopsy:  Breast, left, ultrasound-guided core needle biopsies of mass:   - Invasive lobular carcinoma       - Present in all sampled cores (6 mm in greatest contiguous dimension)       - Aparna-Pal modified SBR score 3 + 2 + 1 = 6 of 9       - Histologic grade 2 of 3       - Mitotic index = 0.1 (average mitoses per high power field) (low)   - Biomarkers, assessed by immunohistochemistry on block 1A       - Estrogen Receptor (ER):  Positive (90%; strong intensity)       - Progesterone Receptor (WV):  Positive (70%; strong intensity)       - HER2:  Equivocal (2+)       - HER2 FISH pending; results to follow in a supplemental report       - Ki-67 proliferation index:  Averages 5-10%   - Lymphovascular invasion is not identified      - 9/29/2022 Unable to tolerate Breast MRI     - 10/19/2022 Lumpectomy and [place] LN assessment with Dr. Spencer  Pathology:  1. LEFT BREAST TO INCLUDE A SEGMENT OF SKIN WITHOUT NIPPLE, PARTIAL   MASTECTOMY:   -  Extensive invasive pleomorphic lobular carcinoma, [place] histologic   grade 2, with focal lymphovascular invasion and focal microcalcifications;   tumor size = 72 mm.  The invasive carcinoma directly extends into the   anterior, inferior, superior, medial, and lateral margins.  The invasive   carcinoma is 1.9 mm from the posterior margin.  Please note that the anterior   margin is the only final margin on this specimen.  The skin is free of tumor.   -  Extensive lobular carcinoma in situ with prominent duct extension,   involvement of sclerosing adenosis, and  focal microcalcifications.   -  Focal atypical ductal hyperplasia.   -  Focal flat epithelial atypia.   -  Multifocal complex sclerosing lesions with focal florid usual ductal   hyperplasia and focal intraductal micropapillomas.   -  Small fibroadenoma, tumor size = 4 mm.   -  Fibrocystic changes with columnar cell change, columnar cell hyperplasia,   adenosis with blunt duct adenosis and sclerosing adenosis, usual ductal   hyperplasia, apocrine metaplasia, microcysts, stromal fibrosis, and   microcalcifications.   -  Focal duct ectasia.   -  Moderate-to-marked arteriosclerosis.   -  Organizing biopsy site with fat necrosis, fibrosis and chronic   inflammation.   2. LEFT BREAST, ADDITIONAL SUPERIOR MARGIN, EXCISION:   -  No evidence of malignancy; there is no invasive lobular carcinoma or   lobular carcinoma in situ.   -  No breast ducts or lobules are present for evaluation.   3. LEFT BREAST, ADDITIONAL INFERIOR MARGIN, EXCISION:   -  Invasive pleomorphic lobular carcinoma, [place] histologic grade 2,   with multifocal lymphovascular invasion.  The invasive carcinoma focally   extends into the new inferior margin.   -  Focal lobular carcinoma in situ with duct extension.   -  Focal complex sclerosing lesion.   -  Fibrocystic changes with columnar cell change, columnar cell hyperplasia,   usual ductal hyperplasia, adenosis with blunt duct adenosis, apocrine   metaplasia, microcysts, stromal fibrosis and focal microcalcifications.   -  Focal duct ectasia.   4. LEFT BREAST, ADDITIONAL MEDIAL MARGIN, EXCISION:   -  Invasive pleomorphic lobular carcinoma, [place] histologic grade 2,   with focal lymphovascular invasion.  The new medial margin is free of tumor   and the invasive carcinoma is 0.9 mm from the new medial margin.   -  Focal lobular carcinoma in situ.   -  Fibrocystic changes with columnar cell change, usual ductal hyperplasia,   and stromal fibrosis.   5. LEFT BREAST, ADDITIONAL LATERAL MARGIN,  EXCISION:   -  No evidence of malignancy; there is no residual invasive lobular carcinoma   or lobular carcinoma in situ.   -  Focal mild fibrocystic changes with columnar cell change, adenosis, and   stromal fibrosis.   6. LEFT BREAST, ADDITIONAL POSTERIOR MARGIN, EXCISION:   -  No evidence of malignancy; there is no residual invasive lobular carcinoma   or lobular carcinoma in situ.   -  No breast ducts or lobules are present for evaluation.   7. LEFT AXILLARY SENTINEL LYMPH NODES (5), EXCISIONAL BIOPSY:   -  1 out of 5 lymph nodes demonstrates micrometastatic pleomorphic lobular   carcinoma; size of metastatic deposit = 1.4 mm.  There is no evidence of   extranodal extension.   -  Mild reactive lymphoid hyperplasia, mixed pattern.   -  Mild lipomatosis.   INVASIVE CARCINOMA OF THE BREAST, RESECTION, CANCER CASE SUMMARY:   PROCEDURE:  Partial mastectomy/lumpectomy with segment of skin without nipple.   SPECIMEN LATERALITY:  Left.   TUMOR SITE:  Not specified.   TUMOR, HISTOLOGIC TYPE:  Invasive pleomorphic lobular carcinoma.   TUMOR, HISTOLOGIC GRADE (ANTONIO HISTOLOGIC SCORE):        Glandular (acinar)/tubular differentiation:  Score 3.        Nuclear pleomorphism:  Score 2.        Mitotic rate:  Score 1.        Overall grade:  Grade 2 (score 6).   TUMOR SIZE:  72 mm.   TUMOR FOCALITY:  Can not be determined.   DUCTAL CARCINOMA IN SITU (DCIS):  Not identified.   TUMOR EXTENT:        Skin:  Skin is present and uninvolved.        Nipple and skeletal muscle:  Not applicable (nipple and skeletal muscle   are absent).   LYMPHOVASCULAR INVASION:  Present.   DERMAL LYMPHOVASCULAR INVASION:  Not identified.   MICROCALCIFICATIONS:  Present in invasive carcinoma, LCIS, and non-neoplastic   tissue.   TREATMENT EFFECT IN THE BREAST:  No known presurgical therapy.   MARGIN STATUS FOR INVASIVE CARCINOMA:        MARGINS INVOLVED BY INVASIVE CARCINOMA:  The anterior and inferior   margins are involved by invasive carcinoma.              Extent of involvement of anterior margin:  4 mm.             Extent of involvement of inferior margin:  Less than 1 mm.        MARGINS UNINVOLVED BY INVASIVE CARCINOMA:  The medial, superior,   posterior and lateral margins are uninvolved by invasive carcinoma.             Distance from invasive carcinoma to medial margin:  0.9 mm.             Distance from invasive carcinoma to superior margin:  8 mm.             Distance from invasive carcinoma to posterior margin:  9.9 mm.             Distance from invasive carcinoma to lateral margin:  13 mm.   REGIONAL LYMPH NODE STATUS:  Regional lymph nodes present; tumor present in   regional lymph node.        Number of lymph nodes with macrometastasis:  0.        Number of lymph nodes with micrometastasis:  1.        Number of lymph nodes with isolated tumor cells:  0.        Size of largest willy metastatic deposit:  1.4 mm.        Extranodal extension:  Not identified.        Total number of lymph nodes examined (sentinel and nonsentinel):  5.        Number of sentinel nodes examined:  5.   DISTANT METASTASIS:  Not applicable.   PATHOLOGIC STAGE CLASSIFICATION (pTNM, AJCC 8TH EDITION):   pT3:  Tumor size = 72 mm (greater than 50 mm in greatest dimension).   pN1mi (sn):  Micrometastasis in 1 axillary sentinel lymph node.   pM: Not applicable.   ADDITIONAL FINDINGS:  Please see above final diagnoses on specimens 1-7.   BREAST BIOMARKER TESTING PERFORMED ON PREVIOUS BIOPSY, CASE [id number]   (interpreted by Dr. Esteban Grace):  Estrogen receptor (ER):  Positive   (90%; strong intensity)   Progesterone receptor (PGR):  Positive (70%; strong intensity).   HER2 by IHC:  Equivocal (2+).   HER2 by FISH:  Negative (performed at [facility]).   Ki-67 proliferation index:  Averages 5-10%.      No Oncotype sent and opted to hold off at age and with tumor characteristics as was not intending to do chemotherapy     GynHx:   Menarche around age 14 but unsure  Menopause  in early 50s, unsure  A0  Very minimal breastfeeding     FH:  No personal history of breast cancer, no family history of breast cancer. No family history of prostate, endometrial, or ovarian cancer.      SH:    Never smoker, non drinker     PMH:          Active Ambulatory Problems     Diagnosis Date Noted    Hypertension 2013    Hyperlipidemia 2013    Recurrent UTI 2016    OAB (overactive bladder) 2016    Malignant neoplasm of right kidney 2016    Invasive lobular carcinoma of breast, stage 2, left 10/03/2022              Resolved Ambulatory Problems     Diagnosis Date Noted    No Resolved Ambulatory Problems              Past Medical History:   Diagnosis Date    Urge incontinence      Vaginal delivery          Review of Systems   Constitutional:         See above   All other systems reviewed and are negative.         Objective     Physical Exam  Vitals and nursing note reviewed.   Constitutional:       General: She is not in acute distress.     Appearance: Normal appearance. She is well-developed and normal weight. She is not ill-appearing.      Comments: Presents with her   ECOG= 0  Very pleasant. Answers questions appropriately.    HENT:      Head: Normocephalic and atraumatic.   Eyes:      General: No scleral icterus.        Right eye: No discharge.         Left eye: No discharge.      Extraocular Movements: Extraocular movements intact.      Conjunctiva/sclera: Conjunctivae normal.      Pupils: Pupils are equal, round, and reactive to light.      Right eye: Pupil is round and reactive.      Left eye: Pupil is round and reactive.   Neck:      Thyroid: No thyromegaly.      Vascular: No JVD.      Trachea: No tracheal deviation.   Cardiovascular:      Rate and Rhythm: Normal rate and regular rhythm.      Heart sounds: Normal heart sounds. No murmur heard.     No friction rub. No gallop.   Pulmonary:      Effort: Pulmonary effort is normal. No respiratory distress.       Breath sounds: Normal breath sounds. No wheezing, rhonchi or rales.      Comments: Left breast mass noted as previous. She is due for a f/u MMg next month  Abdominal:      General: Abdomen is flat. Bowel sounds are normal. There is no distension.      Palpations: Abdomen is soft. There is no mass.      Tenderness: There is no abdominal tenderness. There is no guarding or rebound.      Comments: No organomegaly   Musculoskeletal:         General: No swelling, tenderness or deformity. Normal range of motion.      Cervical back: Normal range of motion and neck supple.      Right lower leg: No edema.      Left lower leg: No edema.   Lymphadenopathy:      Head:      Right side of head: No submandibular adenopathy.      Left side of head: No submandibular adenopathy.      Cervical: No cervical adenopathy.      Right cervical: No superficial, deep or posterior cervical adenopathy.     Left cervical: No superficial, deep or posterior cervical adenopathy.      Upper Body:      Right upper body: No supraclavicular adenopathy.      Left upper body: No supraclavicular adenopathy.   Skin:     General: Skin is warm and dry.      Coloration: Skin is not jaundiced.      Findings: No bruising, lesion, petechiae or rash.   Neurological:      Mental Status: She is alert and oriented to person, place, and time.      Coordination: Coordination normal.      Gait: Gait normal.      Deep Tendon Reflexes: Reflexes are normal and symmetric.   Psychiatric:         Mood and Affect: Mood normal. Mood is not anxious or depressed.         Behavior: Behavior normal.         Thought Content: Thought content normal.         Judgment: Judgment normal.            Assessment and Plan     1. 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 History of renal cell cancer    4. Alzheimer's dementia without behavioral disturbance, psychotic disturbance, mood  disturbance, or anxiety, unspecified dementia severity, unspecified timing of dementia onset    5. Primary hypertension        Stopped breast cancer therapy in light of dementia. Plan to monitor.   Repeat left breast MMG/US mid November as scheduled.   RTC 3 months to see Dr. Oconnell   F/u with all other established providers.   Monitor BP    Route Chart for Scheduling    Med Onc Chart Routing      Follow up with physician 3 months. see Dr. Oconnell   Follow up with [facility]    Infusion scheduling note    Injection scheduling note    Labs    Imaging    Pharmacy appointment    Other referrals                          Patient is in agreement with the proposed treatment plan. All questions were answered to the patient's satisfaction. Pt knows to call clinic for any new or worsening symptoms and if anything is needed before the next clinic visit.    Aureliano Mishra, MSN, APRN, FNP-C  Nurse Practitioner to Dr. Janel Oconnell  [facility]  [facility]  Hematology & Medical Oncology  [address] [place] [id number]  ph. [phone] ext [phone]  Fax. [phone]              30 minutes of total time spent on the encounter, which includes face to face time and non-face to face time preparing to see the patient (eg, review of tests), Obtaining and/or reviewing separately obtained history, Documenting clinical information in the electronic or other health record, Independently interpreting results (not separately reported) and communicating results to the patient/family/caregiver, or Care coordination (not separately reported).

## 2024-11-11 ENCOUNTER — HOSPITAL ENCOUNTER (OUTPATIENT)
Dept: RADIOLOGY | Facility: HOSPITAL | Age: 84
Discharge: HOME | End: 2024-11-11
Attending: INTERNAL MEDICINE
Payer: MEDICARE

## 2024-11-11 DIAGNOSIS — C50.912 INVASIVE LOBULAR CARCINOMA OF LEFT BREAST IN FEMALE: ICD-10-CM

## 2024-11-11 DIAGNOSIS — R92.2 INCONCLUSIVE MAMMOGRAM: ICD-10-CM

## 2024-11-11 PROCEDURE — 77065 DX MAMMO INCL CAD UNI: CPT | Mod: 26,LT,, | Performed by: RADIOLOGY

## 2024-11-11 PROCEDURE — 77065 DX MAMMO INCL CAD UNI: CPT | Mod: TC,LT

## 2024-11-11 PROCEDURE — 77061 BREAST TOMOSYNTHESIS UNI: CPT | Mod: 26,LT,, | Performed by: RADIOLOGY

## 2025-01-16 ENCOUNTER — TELEPHONE (OUTPATIENT)
Dept: HEMATOLOGY/ONCOLOGY | Facility: CLINIC | Age: 85
End: 2025-01-16
Payer: MEDICARE

## 2025-02-07 ENCOUNTER — PATIENT OUTREACH (OUTPATIENT)
Dept: ADMINISTRATIVE | Facility: HOSPITAL | Age: 85
End: 2025-02-07
Payer: MEDICARE

## 2025-02-18 ENCOUNTER — TELEPHONE (OUTPATIENT)
Dept: HEMATOLOGY/ONCOLOGY | Facility: CLINIC | Age: 85
End: 2025-02-18
Payer: MEDICARE

## 2025-02-18 NOTE — TELEPHONE ENCOUNTER
Spoke with .   Pt received paper in mail for short term follow up mammo.  Short term mammo has been done.     Advised  that he can disregard appt slip as the imaging has been completed.     Pt to be seen in march.    has appt information and thanked for phone call.       ----- Message from Quirky sent at 2/18/2025 10:17 AM CST -----  Regarding: Consult/Advisory  Contact: [phone]  Consult/Advisory Name Of Caller: Pt  Finesse   Contact Preference: [phone]  Nature of call: Finesse received a recall letter for imaging at the [facility] location. He is unsure why his wife needs more imaging. Please call to advise thank you

## 2025-03-05 NOTE — PROGRESS NOTES
Subjective:       Lola Lerner is a 77 y.o. female who is an established patient who was self-referred for evaluation of OAB/UTIs and RCC.      Recurrent UTIs  She reports recurrent UTIs. No recent UCx in Epic to review. She reports this happens about 3 times a year. She has classic UTI symptoms - dysuria, frequency. Denies fevers. Denies constipation. No h/o stones.      She also has issues with OAB. Reports urgency and frequency. Nocturia x 2. UUI common usually due to waiting to void. Denies SAMSON. She is on Vesicare 10mg now for about 3 years. This medication helps a lot.     She was treated for UTI after last visit in 4/16 (>100k E coli).    RCC  VICTORINA was done prior to last visit for UTI workup and noted a 3.5cm solid R renal mass. PVR 66cc. No family history of  malignancy. No gross hematuria.      CT scan shows an enhancing 4.6cm R UP renal mass. CXR showed abnormal area in R lung. CT chest showed likely inflammatory scarring, doubt metastasis.    She is now s/p R open partial nephrectomy on 8/17/16. She did well after surgery.    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   CT c/a/p 5/17 - post-op changes R kidney, RUL lung area resolved, now with 2mm nodule in RML lung. CT c/a/p 11/17 - stable R kidney, micronodules in R lung       The following portions of the patient's history were reviewed and updated as appropriate: allergies, current medications, past family history, past medical history, past social history, past surgical history and problem list.    Review of Systems  Constitutional: no fever or chills  ENT: no nasal congestion or sore throat  Respiratory: no cough or shortness of breath  Cardiovascular: no chest pain or palpitations  Gastrointestinal: no nausea or vomiting, tolerating diet  Genitourinary: as per HPI  Hematologic/Lymphatic: no easy bruising or  Patients GI provider:  Dr. Calvillo    Number to return call: [phone]    Reason for call: Pt called and requesting to speak to some one in the office or Dr Calvillo as which prep is better for her Jan/Dul or the Clenpiq as confused between both the preps. Please call pt for the further advise.    Scheduled procedure/appointment date if applicable: procedure 3/11/25     "lymphadenopathy  Musculoskeletal: no arthralgias or myalgias  Skin: no rashes or lesions  Neurological: no seizures or tremors  Behavioral/Psych: no auditory or visual hallucinations       Objective:    Vitals:   /78   Pulse 68   Ht 5' 7" (1.702 m)   Wt 79.2 kg (174 lb 9.7 oz)   BMI 27.35 kg/m²     Physical Exam   General: well developed, well nourished in no acute distress  Head: normocephalic, atraumatic  Neck: supple, trachea midline, no obvious enlargement of thyroid  HEENT: EOMI, mucus membranes moist, sclera anicteric, no hearing impairment  Lungs: symmetric expansion, non-labored breathing  Cardiovascular: regular rate and rhythm, normal pulses  Abdomen: soft, non tender, non distended, no palpable masses, no hepatosplenomegaly, no hernias, no CVA tenderness  Musculoskeletal: no peripheral edema, normal ROM in bilateral upper and lower extremities  Lymphatics: no cervical or inguinal lymphadenopathy  Skin: no rashes or lesions  Neuro: alert and oriented x 3, no gross deficits  Psych: normal judgment and insight, normal mood/affect and non-anxious  Genitourinary:   patient declined exam    Inc R flank - well healed      Lab Review   Urine analysis today in clinic shows - negative    Lab Results   Component Value Date    WBC 8.50 08/20/2016    HGB 13.1 08/20/2016    HCT 39.5 08/20/2016    MCV 92 08/20/2016     08/20/2016     Lab Results   Component Value Date    CREATININE 0.8 11/15/2017    BUN 14 01/26/2017         Imaging  Images and reports were personally reviewed by me and discussed with patient  VICTORINA reviewed  CT reviewed       Assessment/Plan:      1. Recurrent UTI    - VICTORINA with PVR - R renal mass, mildly elevated PVR 66cc   - Recommended Estrace, she declined this.    - Improving UUI will also likely help improve UTIs     2. OAB (overactive bladder)    - Continue Vesicare, doing well with this.   - Offered Myrbetriq in addition to Vesicare, she declined.     3. Malignant neoplasm of R " kidney   - 3.5cm solid renal mass noted on VICTORINA   - CT confirms 4.8cm enhacing RUP mass    - s/p open R partial nephrectomy on 8/17/16   - Pathology: ccRCC, 4.2cm, pT1b, FG 2, neg margins   - CT c/a/p 11/16 - stable 1.6cm area in RUL lung (inflammatory), likely post-op changes in R kidney.    - CT c/a/p 5/17 - R kidney stable, new 2mm nodule in RML lung.    - CT c/a/p 11/17 - R kidney stable, micronodules of R lung. Recheck in 1 year.        Follow up in 12 months with CT scan

## 2025-03-24 ENCOUNTER — OFFICE VISIT (OUTPATIENT)
Dept: HEMATOLOGY/ONCOLOGY | Facility: CLINIC | Age: 85
End: 2025-03-24
Payer: MEDICARE

## 2025-03-24 VITALS
BODY MASS INDEX: 23.29 KG/M2 | HEART RATE: 75 BPM | HEIGHT: 67 IN | OXYGEN SATURATION: 100 % | SYSTOLIC BLOOD PRESSURE: 195 MMHG | RESPIRATION RATE: 17 BRPM | TEMPERATURE: 97 F | DIASTOLIC BLOOD PRESSURE: 85 MMHG | WEIGHT: 148.38 LBS

## 2025-03-24 DIAGNOSIS — Z12.31 ENCOUNTER FOR SCREENING MAMMOGRAM FOR MALIGNANT NEOPLASM OF BREAST: ICD-10-CM

## 2025-03-24 DIAGNOSIS — C50.912 INVASIVE LOBULAR CARCINOMA OF LEFT BREAST IN FEMALE: Primary | ICD-10-CM

## 2025-03-24 DIAGNOSIS — G30.0 MODERATE EARLY ONSET ALZHEIMER'S DEMENTIA, UNSPECIFIED WHETHER BEHAVIORAL, PSYCHOTIC, OR MOOD DISTURBANCE OR ANXIETY: ICD-10-CM

## 2025-03-24 DIAGNOSIS — C77.3 SECONDARY AND UNSPECIFIED MALIGNANT NEOPLASM OF AXILLA AND UPPER LIMB LYMPH NODES: ICD-10-CM

## 2025-03-24 DIAGNOSIS — F02.B0 MODERATE EARLY ONSET ALZHEIMER'S DEMENTIA, UNSPECIFIED WHETHER BEHAVIORAL, PSYCHOTIC, OR MOOD DISTURBANCE OR ANXIETY: ICD-10-CM

## 2025-03-24 PROCEDURE — 99214 OFFICE O/P EST MOD 30 MIN: CPT | Mod: S$PBB,,, | Performed by: INTERNAL MEDICINE

## 2025-03-24 PROCEDURE — G2211 COMPLEX E/M VISIT ADD ON: HCPCS | Mod: S$PBB,,, | Performed by: INTERNAL MEDICINE

## 2025-03-24 PROCEDURE — 99213 OFFICE O/P EST LOW 20 MIN: CPT | Mod: PBBFAC | Performed by: INTERNAL MEDICINE

## 2025-03-24 PROCEDURE — 99999 PR PBB SHADOW E&M-EST. PATIENT-LVL III: CPT | Mod: PBBFAC,,, | Performed by: INTERNAL MEDICINE

## 2025-03-24 NOTE — PROGRESS NOTES
Subjective     Patient ID: Lola Lerner is a 84 y.o. female.    Chief Complaint: Invasive lobular carcinoma of left breast in female    HPI    Follows up breast cancer  Presents for follow up of invasive lobular breast cancer diagnosis after completion of surgery  pT3 pN1mi     She is now just on surveillance  Alzheimer's remains her biggest issue and she continues to have great support with her   Mild weight loss (3 lbs) and they report a fair appetite  Denies pain complaints  On Gemtesa for urinary frequency and improvement noted    Last breast imaging-- she can now return to an annual schedule.  desires to continue annual imaging for now  11/11/2024 Mammogram (6 month follow up)  Findings:  This procedure was performed using tomosynthesis. Computer-aided detection was utilized in the interpretation of this examination.  There are scattered areas of fibroglandular density.   In the left breast central region, middle and posterior depths, there is an oval fat containing expected postsurgical seroma measuring 3.6 cm long axis.  Associated circumscribed margins.  This corresponds to the finding recommended for follow-up August 21, 2024.  This finding demonstrates expected evolution of a benign postsurgical seroma.  No suspicious finding or acute abnormality.  No further specific follow-up warranted for this finding.  Impression:  There is no mammographic evidence of malignancy in the left breast.  BI-RADS Category:   Overall: 2 - Benign  Recommendation:  Return to annual screening mammogram schedule is recommended August 2025.     Oncology History:  - self detected a lump in her left breast about one month prior. She has not noted any pain, skin changes, or nipple discharge. She did report that her left nipple had changed somewhat (may be more inverted now).     - she was up to date with screening mammograms up until 2018. She required a biopsy once for a suspicious finding on mammogram but this  was ultimately benign.      - 9/14/2022 Diagnostic Mammogram:  Findings:  This procedure was performed using tomosynthesis. Computer-aided detection was utilized in the interpretation of this examination.  The breasts have scattered areas of fibroglandular density.   Mammo Digital Diagnostic Bilat with Jovanny  Left  Developing Asymmetry: There is a developing asymmetry seen in the upper inner quadrant of the left breast. The asymmetry correlates with the palpable mass and skin changes reported by the patient.   Right  There is no evidence of suspicious masses, calcifications, or other abnormal findings in the right breast.  US Breast Left Limited  Left  Mass: There is a 26 mm x 33 mm x 29 mm irregularly shaped, non-parallel, hypoechoic mass with indistinct margins with shadowing seen in the upper inner quadrant of the left breast at 10 o'clock, 2 cm from the nipple. The mass correlates with the palpable mass reported by the patient.    Targeted ultrasound of the left axilla is normal.  Impression:  Left  Mass: Left breast 26 mm x 33 mm x 29 mm mass at the upper inner 10 o'clock position. Assessment: 4A - Suspicious finding. Biopsy is recommended.   Right  There is no mammographic or sonographic evidence of malignancy in the right breast.  BI-RADS Category:   Overall: 4A - Low Suspicion for Malignancy  Recommendation:  Biopsy is recommended. I discussed these findings and recommendations with the patient and her  in detail at the time of exam.     - 9/20/2022 Breast Biopsy:  Breast, left, ultrasound-guided core needle biopsies of mass:   - Invasive lobular carcinoma       - Present in all sampled cores (6 mm in greatest contiguous dimension)       - Aparna-Pal modified SBR score 3 + 2 + 1 = 6 of 9       - Histologic grade 2 of 3       - Mitotic index = 0.1 (average mitoses per high power field) (low)   - Biomarkers, assessed by immunohistochemistry on block 1A       - Estrogen Receptor (ER):  Positive (90%;  strong intensity)       - Progesterone Receptor (CA):  Positive (70%; strong intensity)       - HER2:  Equivocal (2+)       - HER2 FISH pending; results to follow in a supplemental report       - Ki-67 proliferation index:  Averages 5-10%   - Lymphovascular invasion is not identified      - 9/29/2022 Unable to tolerate Breast MRI     - 10/19/2022 Lumpectomy and [place] LN assessment with Dr. Spencer  Pathology:  1. LEFT BREAST TO INCLUDE A SEGMENT OF SKIN WITHOUT NIPPLE, PARTIAL   MASTECTOMY:   -  Extensive invasive pleomorphic lobular carcinoma, [place] histologic   grade 2, with focal lymphovascular invasion and focal microcalcifications;   tumor size = 72 mm.  The invasive carcinoma directly extends into the   anterior, inferior, superior, medial, and lateral margins.  The invasive   carcinoma is 1.9 mm from the posterior margin.  Please note that the anterior   margin is the only final margin on this specimen.  The skin is free of tumor.   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   -  Focal atypical ductal hyperplasia.   -  Focal flat epithelial atypia.   -  Multifocal complex sclerosing lesions with focal florid usual ductal   hyperplasia and focal intraductal micropapillomas.   -  Small fibroadenoma, tumor size = 4 mm.   -  Fibrocystic changes with columnar cell change, columnar cell hyperplasia,   adenosis with blunt duct adenosis and sclerosing adenosis, usual ductal   hyperplasia, apocrine metaplasia, microcysts, stromal fibrosis, and   microcalcifications.   -  Focal duct ectasia.   -  Moderate-to-marked arteriosclerosis.   -  Organizing biopsy site with fat necrosis, fibrosis and chronic   inflammation.   2. LEFT BREAST, ADDITIONAL SUPERIOR MARGIN, EXCISION:   -  No evidence of malignancy; there is no invasive lobular carcinoma or   lobular carcinoma in situ.   -  No breast ducts or lobules are present for  evaluation.   3. LEFT BREAST, ADDITIONAL INFERIOR MARGIN, EXCISION:   -  Invasive pleomorphic lobular carcinoma, [place] histologic grade 2,   with multifocal lymphovascular invasion.  The invasive carcinoma focally   extends into the new inferior margin.   -  Focal lobular carcinoma in situ with duct extension.   -  Focal complex sclerosing lesion.   -  Fibrocystic changes with columnar cell change, columnar cell hyperplasia,   usual ductal hyperplasia, adenosis with blunt duct adenosis, apocrine   metaplasia, microcysts, stromal fibrosis and focal microcalcifications.   -  Focal duct ectasia.   4. LEFT BREAST, ADDITIONAL MEDIAL MARGIN, EXCISION:   -  Invasive pleomorphic lobular carcinoma, [place] histologic grade 2,   with focal lymphovascular invasion.  The new medial margin is free of tumor   and the invasive carcinoma is 0.9 mm from the new medial margin.   -  Focal lobular carcinoma in situ.   -  Fibrocystic changes with columnar cell change, usual ductal hyperplasia,   and stromal fibrosis.   5. LEFT BREAST, ADDITIONAL LATERAL MARGIN, EXCISION:   -  No evidence of malignancy; there is no residual invasive lobular carcinoma   or lobular carcinoma in situ.   -  Focal mild fibrocystic changes with columnar cell change, adenosis, and   stromal fibrosis.   6. LEFT BREAST, ADDITIONAL POSTERIOR MARGIN, EXCISION:   -  No evidence of malignancy; there is no residual invasive lobular carcinoma   or lobular carcinoma in situ.   -  No breast ducts or lobules are present for evaluation.   7. LEFT AXILLARY SENTINEL LYMPH NODES (5), EXCISIONAL BIOPSY:   -  1 out of 5 lymph nodes demonstrates micrometastatic pleomorphic lobular   carcinoma; size of metastatic deposit = 1.4 mm.  There is no evidence of   extranodal extension.   -  Mild reactive lymphoid hyperplasia, mixed pattern.   -  Mild lipomatosis.   INVASIVE CARCINOMA OF THE BREAST, RESECTION, CANCER CASE SUMMARY:   PROCEDURE:  Partial mastectomy/lumpectomy with  segment of skin without nipple.   SPECIMEN LATERALITY:  Left.   TUMOR SITE:  Not specified.   TUMOR, HISTOLOGIC TYPE:  Invasive pleomorphic lobular carcinoma.   TUMOR, HISTOLOGIC GRADE (ANTONOI HISTOLOGIC SCORE):        Glandular (acinar)/tubular differentiation:  Score 3.        Nuclear pleomorphism:  Score 2.        Mitotic rate:  Score 1.        Overall grade:  Grade 2 (score 6).   TUMOR SIZE:  72 mm.   TUMOR FOCALITY:  Can not be determined.   DUCTAL CARCINOMA IN SITU (DCIS):  Not identified.   TUMOR EXTENT:        Skin:  Skin is present and uninvolved.        Nipple and skeletal muscle:  Not applicable (nipple and skeletal muscle   are absent).   LYMPHOVASCULAR INVASION:  Present.   DERMAL LYMPHOVASCULAR INVASION:  Not identified.   MICROCALCIFICATIONS:  Present in invasive carcinoma, LCIS, and non-neoplastic   tissue.   TREATMENT EFFECT IN THE BREAST:  No known presurgical therapy.   MARGIN STATUS FOR INVASIVE CARCINOMA:        MARGINS INVOLVED BY INVASIVE CARCINOMA:  The anterior and inferior   margins are involved by invasive carcinoma.             Extent of involvement of anterior margin:  4 mm.             Extent of involvement of inferior margin:  Less than 1 mm.        MARGINS UNINVOLVED BY INVASIVE CARCINOMA:  The medial, superior,   posterior and lateral margins are uninvolved by invasive carcinoma.             Distance from invasive carcinoma to medial margin:  0.9 mm.             Distance from invasive carcinoma to superior margin:  8 mm.             Distance from invasive carcinoma to posterior margin:  9.9 mm.             Distance from invasive carcinoma to lateral margin:  13 mm.   REGIONAL LYMPH NODE STATUS:  Regional lymph nodes present; tumor present in   regional lymph node.        Number of lymph nodes with macrometastasis:  0.        Number of lymph nodes with micrometastasis:  1.        Number of lymph nodes with isolated tumor cells:  0.        Size of largest willy metastatic deposit:   1.4 mm.        Extranodal extension:  Not identified.        Total number of lymph nodes examined (sentinel and nonsentinel):  5.        Number of sentinel nodes examined:  5.   DISTANT METASTASIS:  Not applicable.   PATHOLOGIC STAGE CLASSIFICATION (pTNM, AJCC 8TH EDITION):   pT3:  Tumor size = 72 mm (greater than 50 mm in greatest dimension).   pN1mi (sn):  Micrometastasis in 1 axillary sentinel lymph node.   pM: Not applicable.   ADDITIONAL FINDINGS:  Please see above final diagnoses on specimens 1-7.   BREAST BIOMARKER TESTING PERFORMED ON PREVIOUS BIOPSY, CASE [id number]   (interpreted by Dr. Esteban Grace):  Estrogen receptor (ER):  Positive   (90%; strong intensity)   Progesterone receptor (PGR):  Positive (70%; strong intensity).   HER2 by IHC:  Equivocal (2+).   HER2 by FISH:  Negative (performed at [facility]).   Ki-67 proliferation index:  Averages 5-10%.      No Oncotype sent and opted to hold off at age and with tumor characteristics as was not intending to do chemotherapy     GynHx:   Menarche around age 14 but unsure  Menopause in early 50s, unsure  A0  Very minimal breastfeeding     FH:  No personal history of breast cancer, no family history of breast cancer. No family history of prostate, endometrial, or ovarian cancer.      SH:    Never smoker, non drinker     PMH:          Active Ambulatory Problems     Diagnosis Date Noted    Hypertension 2013    Hyperlipidemia 2013    Recurrent UTI 2016    OAB (overactive bladder) 2016    Malignant neoplasm of right kidney 2016    Invasive lobular carcinoma of breast, stage 2, left 10/03/2022              Resolved Ambulatory Problems     Diagnosis Date Noted    No Resolved Ambulatory Problems              Past Medical History:   Diagnosis Date    Urge incontinence      Vaginal delivery          Review of Systems   Constitutional:  Negative for activity change, appetite change, chills, fatigue, fever and unexpected  weight change.   HENT:  Negative for nasal congestion, mouth sores, postnasal drip, rhinorrhea, sinus pressure/congestion and trouble swallowing.    Eyes:  Negative for visual disturbance.   Respiratory:  Negative for cough and shortness of breath.    Cardiovascular:  Negative for chest pain, palpitations and leg swelling.   Gastrointestinal:  Negative for abdominal pain, diarrhea, nausea, vomiting and reflux.   Genitourinary:  Positive for frequency (sees Urology). Negative for bladder incontinence, decreased urine volume, difficulty urinating, dysuria and urgency.   Musculoskeletal:  Negative for arthralgias, back pain, gait problem, joint swelling and joint deformity.   Integumentary:  Negative for rash.   Neurological:  Negative for dizziness, weakness, light-headedness, numbness and headaches.   Hematological:  Negative for adenopathy. Does not bruise/bleed easily.   Psychiatric/Behavioral:  Positive for confusion. Negative for dysphoric mood and sleep disturbance. The patient is not nervous/anxious.           Objective     Physical Exam  Vitals and nursing note reviewed.   Constitutional:       General: She is not in acute distress.     Appearance: Normal appearance. She is well-developed and normal weight. She is not ill-appearing.      Comments: Presents with her   Very pleasant   HENT:      Head: Normocephalic and atraumatic.   Eyes:      General: No scleral icterus.     Extraocular Movements: Extraocular movements intact.      Conjunctiva/sclera: Conjunctivae normal.      Pupils: Pupils are equal, round, and reactive to light.      Right eye: Pupil is round and reactive.      Left eye: Pupil is round and reactive.   Neck:      Thyroid: No thyromegaly.      Vascular: No JVD.      Trachea: No tracheal deviation.   Cardiovascular:      Rate and Rhythm: Normal rate and regular rhythm.      Heart sounds: Normal heart sounds. No murmur heard.     No friction rub. No gallop.   Pulmonary:      Effort:  Pulmonary effort is normal. No respiratory distress.      Breath sounds: Normal breath sounds. No wheezing, rhonchi or rales.      Comments: Left breast has an area of mass not felt prior  Abdominal:      General: Abdomen is flat. Bowel sounds are normal. There is no distension.      Palpations: Abdomen is soft. There is no mass.      Tenderness: There is no abdominal tenderness. There is no guarding or rebound.      Comments: No organomegaly   Musculoskeletal:         General: No swelling, tenderness or deformity. Normal range of motion.      Cervical back: Normal range of motion and neck supple.      Right lower leg: No edema.      Left lower leg: No edema.   Lymphadenopathy:      Head:      Right side of head: No submandibular adenopathy.      Left side of head: No submandibular adenopathy.      Cervical: No cervical adenopathy.      Right cervical: No superficial, deep or posterior cervical adenopathy.     Left cervical: No superficial, deep or posterior cervical adenopathy.      Upper Body:      Right upper body: No supraclavicular adenopathy.      Left upper body: No supraclavicular adenopathy.   Skin:     General: Skin is warm and dry.      Coloration: Skin is not jaundiced.      Findings: No bruising, lesion, petechiae or rash.   Neurological:      General: No focal deficit present.      Mental Status: She is alert and oriented to person, place, and time.      Sensory: No sensory deficit.      Motor: No weakness.      Coordination: Coordination normal.      Gait: Gait normal.      Deep Tendon Reflexes: Reflexes are normal and symmetric.   Psychiatric:         Mood and Affect: Mood normal. Mood is not anxious or depressed.         Behavior: Behavior normal.         Thought Content: Thought content normal.         Judgment: Judgment normal.      Comments: + dementia memory issues noted          Assessment and Plan     1. Invasive lobular carcinoma of left breast in female    2. Secondary and unspecified  malignant neoplasm of axilla and upper limb lymph nodes    3. Moderate early onset Alzheimer's dementia, unspecified whether behavioral, psychotic, or mood disturbance or anxiety      ILC left breast with prior LNs involved  Stopped breast cancer therapy in light of dementia. Plan to monitor clinically at this point for as long as appropriate with other health conditions.  Repeat breast imaging 8/2025 on regular schedule    Route Chart for Scheduling    Med Onc Chart Routing      Follow up with physician . 8/2025 Mammogram, labs same day   Follow up with FABIOLA 1 year. see FABIOLA 1 year   Infusion scheduling note    Injection scheduling note    Labs    Imaging    Pharmacy appointment    Other referrals                code applied: patient requires or will require a continuous, longitudinal, and active collaborative plan of care related to this patient's health condition, breast cancer --the management of which requires the direction of a practitioner with specialized clinical knowledge, skill, and expertise.

## 2025-06-11 ENCOUNTER — TELEPHONE (OUTPATIENT)
Dept: UROLOGY | Facility: CLINIC | Age: 85
End: 2025-06-11
Payer: MEDICARE

## 2025-06-13 ENCOUNTER — OFFICE VISIT (OUTPATIENT)
Dept: UROLOGY | Facility: CLINIC | Age: 85
End: 2025-06-13
Payer: MEDICARE

## 2025-06-13 DIAGNOSIS — N32.81 OAB (OVERACTIVE BLADDER): ICD-10-CM

## 2025-06-13 DIAGNOSIS — C64.1 MALIGNANT NEOPLASM OF RIGHT KIDNEY: Primary | ICD-10-CM

## 2025-06-13 PROCEDURE — 99212 OFFICE O/P EST SF 10 MIN: CPT | Mod: PBBFAC | Performed by: UROLOGY

## 2025-06-13 PROCEDURE — 99999 PR PBB SHADOW E&M-EST. PATIENT-LVL II: CPT | Mod: PBBFAC,,, | Performed by: UROLOGY

## 2025-06-13 RX ORDER — VIBEGRON 75 MG/1
75 TABLET, FILM COATED ORAL DAILY
Qty: 90 TABLET | Refills: 3 | Status: SHIPPED | OUTPATIENT
Start: 2025-06-13

## 2025-06-13 NOTE — PROGRESS NOTES
Subjective:       Lola Lerner is a 84 y.o. female who is an established patient who was self-referred for evaluation of OAB/UTIs and RCC.      She reports recurrent UTIs. About 3 times a year. She has classic UTI symptoms - dysuria, frequency. Denies fevers. Denies constipation. No h/o stones.      She also has issues with OAB. Reports urgency and frequency. Nocturia x 2. UUI common usually due to waiting to void. Denies SAMSON. She was on Vesicare 10mg, initially helpful. Changed to Sanctura, then Myrbetriq, then Oxytrol. Restarted Myrbetriq 25 in  with modest improvement.    PVR (bladder scan) - 0cc, 86cc, 56cc, 107cc    VICTORINA was done for UTI workup and noted a 3.5cm solid R renal mass. PVR 66cc. No family history of  malignancy. No gross hematuria.      CT scan shows an enhancing 4.6cm R UP renal mass. CXR showed abnormal area in R lung. CT chest showed likely inflammatory scarring, doubt metastasis.    She is now s/p R open partial nephrectomy on 16. She did well after surgery.  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 Used walker, now improved. Denies LUTS - no dysuria or other UTI symptoms. She does not currently have PCP (recently ).     2023  Recent breast cancer diagnosis. Returns with frequency. Denies dysuria. +UA. On Myrbetriq 50mg.   PVR (bladder scan) today - 16cc    2023  Less urgency. Remains on Myrbetriq 50mg. Treated for UTI in  - E coli. Nocturia x 1.     2024  Recent VICTORINA normal. No recent UTIs. Remains on Myrbetriq 50mg. +UUI, mainly at night. Doing Kegels x 2 weeks, no change.     2024  Given trial Gemtesa. Symptoms improved significantly. No recent UTIs. Still with nocturia but less bothersome.     2025  Here for med refills. Gemtesa ran out about 2 days ago.  feels medication have reduced nocturia. Planned for repeat imaging in  2/2026.       CT c/a/p 11/16 - stable 1.6cm area in RUL lung (likely inflammatory), likely post-op changes in R kidney.   CT c/a/p 5/17 - post-op changes R kidney, RUL lung area resolved, now with 2mm nodule in RML lung. CT c/a/p 11/17 - stable R kidney, micronodules in R lung  CT c/a/p 11/18 - R renal scar, stable micronodules in lung  CT a/p 11/19 - R renal scar, no mets/recurrence, stable micronodules in lung, CXR - wnl  CT a/p 11/20 - post-op R kidney, no mets/recurrence, simple L renal cysts. Stable micronodule in RLL lung. CXR - wnl  CT a/p 11/21 - no mets/recurrence. CXR - wnl  VICTORINA 2/24 - no new renal mass/stone. R partial.          The following portions of the patient's history were reviewed and updated as appropriate: allergies, current medications, past family history, past medical history, past social history, past surgical history and problem list.    Review of Systems  Constitutional: no fever or chills  ENT: no nasal congestion or sore throat  Respiratory: no cough or shortness of breath  Cardiovascular: no chest pain or palpitations  Gastrointestinal: no nausea or vomiting, tolerating diet  Genitourinary: as per HPI  Hematologic/Lymphatic: no easy bruising or lymphadenopathy  Musculoskeletal: no arthralgias or myalgias  Skin: no rashes or lesions  Neurological: no seizures or tremors  Behavioral/Psych: no auditory or visual hallucinations       Objective:    Vitals:   There were no vitals taken for this visit.    Physical Exam   General: well developed, well nourished in no acute distress  Head: normocephalic, atraumatic  Neck: supple, trachea midline, no obvious enlargement of thyroid  HEENT: EOMI, mucus membranes moist, sclera anicteric, no hearing impairment  Lungs: symmetric expansion, non-labored breathing  Skin: no rashes or lesions  Neuro: alert and oriented x 3, no gross deficits  Psych: normal judgment and insight, normal mood/affect and non-anxious  Genitourinary: deferred    Inc R flank -  well healed      Lab Review    Urine analysis today in clinic shows - no urine     Lab Results   Component Value Date    WBC 5.84 07/29/2024    HGB 13.9 07/29/2024    HCT 42.2 07/29/2024    MCV 94 07/29/2024     07/29/2024     Lab Results   Component Value Date    CREATININE 0.7 07/29/2024    BUN 13 07/29/2024         Imaging  Images and reports were personally reviewed by me and discussed with patient  VICTORINA reviewed, CT reviewed       Assessment/Plan:      1. Recurrent UTI    - VICTORINA with PVR - R renal mass, mildly elevated PVR 66cc   - Recommended Estrace, she declined this.    - Improving UUI will also likely help improve UTIs     2. OAB (overactive bladder)    - Worsened previously 2/2 UTI - treated   - Myrbetriq - stopped 2/2 SE (dizziness)   - Sanctura - seems to help but severe dry mouth   - Oxytrol patch (OTC) - some improvement, using though some difficulty obtaining   - Bladder retraining/pelvic floor exercises handout given previously - handout given again   - Trial Gemtesa - doing better. Stopped Myrbetriq. Gemtesa refilled now.      3. 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 Okay to stop screening - okay to cont VICTORINA q2y    - VICTORINA in 2 yr (2/26)    4. Leg pain   - Do not suspect  related    - Improved   - No urinary symptoms    - Recent imaging of kidney (11/21) normal     5. Nocturia   - x 1   - Reduce PM fluids          Follow up in 8 months with VICTORINA        Visit today included increased complexity associated with the care of the episodic problem nocturia, OAB, RCC addressed and managing the longitudinal care of the patient due to the serious and/or complex managed problem(s) nocturia, OAB, RCC.

## 2025-06-23 DIAGNOSIS — Z00.00 ENCOUNTER FOR MEDICARE ANNUAL WELLNESS EXAM: ICD-10-CM

## 2025-08-04 ENCOUNTER — PATIENT MESSAGE (OUTPATIENT)
Dept: ADMINISTRATIVE | Facility: HOSPITAL | Age: 85
End: 2025-08-04
Payer: MEDICARE

## 2025-08-25 ENCOUNTER — HOSPITAL ENCOUNTER (OUTPATIENT)
Dept: RADIOLOGY | Facility: HOSPITAL | Age: 85
Discharge: HOME | End: 2025-08-25
Attending: INTERNAL MEDICINE
Payer: MEDICARE

## 2025-08-25 DIAGNOSIS — C50.912 INVASIVE LOBULAR CARCINOMA OF LEFT BREAST IN FEMALE: ICD-10-CM

## 2025-08-25 DIAGNOSIS — Z12.31 ENCOUNTER FOR SCREENING MAMMOGRAM FOR MALIGNANT NEOPLASM OF BREAST: ICD-10-CM

## 2025-08-25 DIAGNOSIS — C77.3 SECONDARY AND UNSPECIFIED MALIGNANT NEOPLASM OF AXILLA AND UPPER LIMB LYMPH NODES: ICD-10-CM

## 2025-08-25 PROCEDURE — 77063 BREAST TOMOSYNTHESIS BI: CPT | Mod: TC

## (undated) DEVICE — MARGIN MARKER STANDARD 6 COLOR

## (undated) DEVICE — CONTAINER SPECIMEN OR STER 4OZ

## (undated) DEVICE — SUT ETHILON 2-0 PSLX 30IN

## (undated) DEVICE — DRAPE HALF SURGICAL 40X58IN

## (undated) DEVICE — SUT VICRYL 3-0 27 SH

## (undated) DEVICE — ADHESIVE DERMABOND ADVANCED

## (undated) DEVICE — DRESSING XEROFORM FOIL PK 1X8

## (undated) DEVICE — APPLIER CLIP LIAGCLIP 9.375IN

## (undated) DEVICE — COVER PROBE US 5.5X58L NON LTX

## (undated) DEVICE — CUP MEDICINE GRADUATED 1OZ

## (undated) DEVICE — PACK UNIVERSAL SPLIT II

## (undated) DEVICE — TRAY MINOR GEN SURG OMC

## (undated) DEVICE — ELECTRODE REM PLYHSV RETURN 9

## (undated) DEVICE — STAPLER SKIN PROXIMATE WIDE

## (undated) DEVICE — DRAPE STERI INSTRUMENT 1018

## (undated) DEVICE — SUT CTD VICRYL 4-0 BR PS-2

## (undated) DEVICE — GAUZE FLUFF XXLG 36X36 2 PLY

## (undated) DEVICE — TIP YANKAUERS BULB NO VENT

## (undated) DEVICE — SUT SILK 3-0 SH 18IN BLACK

## (undated) DEVICE — SPONGE LAP 18X18 PREWASHED

## (undated) DEVICE — ELECTRODE BLADE INSULATED 1 IN